# Patient Record
Sex: FEMALE | Race: WHITE | NOT HISPANIC OR LATINO | Employment: OTHER | URBAN - METROPOLITAN AREA
[De-identification: names, ages, dates, MRNs, and addresses within clinical notes are randomized per-mention and may not be internally consistent; named-entity substitution may affect disease eponyms.]

---

## 2017-03-09 ENCOUNTER — TELEPHONE (OUTPATIENT)
Dept: INTERNAL MEDICINE | Facility: CLINIC | Age: 62
End: 2017-03-09

## 2017-03-09 NOTE — TELEPHONE ENCOUNTER
----- Message from Yusra Hopper sent at 3/9/2017 11:29 AM EST -----  Contact: PT'S   PT HAD DAUGHTER ASK WHILE SHE WAS HERE.   12-27-17 HAD HERNIA SURGERY AND SHE WANTED TO KNOW IF SHE NEEDED TO MAKE AN APPOINTMENT FOR A F/U W/ DR MCGHEE   PLEASE CALL THE PATIENT TO LET HER KNOW IF A FOLLOW UP IS NEEDED

## 2017-07-05 ENCOUNTER — OFFICE VISIT (OUTPATIENT)
Dept: INTERNAL MEDICINE | Facility: CLINIC | Age: 62
End: 2017-07-05

## 2017-07-05 VITALS
OXYGEN SATURATION: 99 % | WEIGHT: 204.6 LBS | SYSTOLIC BLOOD PRESSURE: 110 MMHG | HEART RATE: 78 BPM | BODY MASS INDEX: 33.02 KG/M2 | DIASTOLIC BLOOD PRESSURE: 70 MMHG

## 2017-07-05 DIAGNOSIS — H00.015 HORDEOLUM EXTERNUM OF LEFT LOWER EYELID: Primary | ICD-10-CM

## 2017-07-05 PROCEDURE — 99212 OFFICE O/P EST SF 10 MIN: CPT | Performed by: INTERNAL MEDICINE

## 2017-07-05 NOTE — PROGRESS NOTES
Subjective   Samantha Gaines is a 62 y.o. female.   Chief Complaint   Patient presents with   • Stye         History of Present Illness   2 week hx of stye on left eye. Using warm compresses. Went to [facility] and given antibiotic.  The following portions of the patient's history were reviewed and updated as appropriate: allergies, current medications, past family history, past medical history, past social history, past surgical history and problem list.    Review of Systems   Constitutional: Negative for activity change, appetite change, chills, diaphoresis, fatigue, fever and unexpected weight change.   HENT: Negative for congestion, ear discharge, ear pain, mouth sores, nosebleeds, sinus pressure, sneezing and sore throat.         Stye on left eye   Eyes: Negative for pain, discharge and itching.   Respiratory: Negative for cough, chest tightness, shortness of breath and wheezing.    Cardiovascular: Negative for chest pain, palpitations and leg swelling.   Gastrointestinal: Negative for abdominal pain, constipation, diarrhea, nausea and vomiting.   Endocrine: Negative for cold intolerance, heat intolerance, polydipsia and polyphagia.   Genitourinary: Negative for dysuria, flank pain, frequency, hematuria and urgency.   Musculoskeletal: Negative for arthralgias, back pain, gait problem, myalgias, neck pain and neck stiffness.   Skin: Negative for color change, pallor and rash.   Neurological: Negative for seizures, speech difficulty, numbness and headaches.   Psychiatric/Behavioral: Negative for agitation, confusion, decreased concentration and sleep disturbance. The patient is not nervous/anxious.      /70  Pulse 78  Wt 204 lb 9.6 oz (92.8 kg)  SpO2 99%  BMI 33.02 kg/m2    Objective   Physical Exam   Constitutional: She is oriented to person, place, and time.   Eyes: Right eye exhibits no discharge. Left eye exhibits no discharge.   Left lower lid, small stye   Cardiovascular: Regular rhythm and normal heart  sounds.    Pulmonary/Chest: Breath sounds normal.   Neurological: She is oriented to person, place, and time.   Psychiatric: She has a normal mood and affect.       Assessment/Plan   Samantha was seen today for stye.    Diagnoses and all orders for this visit:    Hordeolum externum of left lower eyelid    Is going to see her opth. Continue warm compresses.

## 2017-11-09 ENCOUNTER — OFFICE VISIT (OUTPATIENT)
Dept: INTERNAL MEDICINE | Facility: CLINIC | Age: 62
End: 2017-11-09

## 2017-11-09 VITALS
WEIGHT: 190.6 LBS | HEIGHT: 66 IN | OXYGEN SATURATION: 99 % | DIASTOLIC BLOOD PRESSURE: 80 MMHG | HEART RATE: 85 BPM | SYSTOLIC BLOOD PRESSURE: 110 MMHG | BODY MASS INDEX: 30.63 KG/M2

## 2017-11-09 DIAGNOSIS — Z00.00 HEALTH CARE MAINTENANCE: Primary | ICD-10-CM

## 2017-11-09 DIAGNOSIS — R31.9 URINARY TRACT INFECTION WITH HEMATURIA, SITE UNSPECIFIED: ICD-10-CM

## 2017-11-09 DIAGNOSIS — N39.0 URINARY TRACT INFECTION WITH HEMATURIA, SITE UNSPECIFIED: ICD-10-CM

## 2017-11-09 DIAGNOSIS — Z78.0 POSTMENOPAUSAL: ICD-10-CM

## 2017-11-09 DIAGNOSIS — M25.552 LEFT HIP PAIN: ICD-10-CM

## 2017-11-09 LAB
25(OH)D3 SERPL-MCNC: 23.7 NG/ML
ALBUMIN SERPL-MCNC: 4.4 G/DL (ref 3.2–4.8)
ALBUMIN/GLOB SERPL: 1.8 G/DL (ref 1.5–2.5)
ALP SERPL-CCNC: 109 U/L (ref 25–100)
ALT SERPL W P-5'-P-CCNC: 63 U/L (ref 7–40)
ANION GAP SERPL CALCULATED.3IONS-SCNC: 3 MMOL/L (ref 3–11)
ARTICHOKE IGE QN: 134 MG/DL (ref 0–130)
AST SERPL-CCNC: 41 U/L (ref 0–33)
BASOPHILS # BLD AUTO: 0.05 10*3/MM3 (ref 0–0.2)
BASOPHILS NFR BLD AUTO: 0.4 % (ref 0–1)
BILIRUB BLD-MCNC: ABNORMAL MG/DL
BILIRUB SERPL-MCNC: 1 MG/DL (ref 0.3–1.2)
BUN BLD-MCNC: 11 MG/DL (ref 9–23)
BUN/CREAT SERPL: 15.7 (ref 7–25)
CALCIUM SPEC-SCNC: 9.4 MG/DL (ref 8.7–10.4)
CHLORIDE SERPL-SCNC: 106 MMOL/L (ref 99–109)
CHOLEST SERPL-MCNC: 223 MG/DL (ref 0–200)
CLARITY, POC: CLEAR
CO2 SERPL-SCNC: 34 MMOL/L (ref 20–31)
COLOR UR: YELLOW
CREAT BLD-MCNC: 0.7 MG/DL (ref 0.6–1.3)
DEPRECATED RDW RBC AUTO: 45.1 FL (ref 37–54)
EOSINOPHIL # BLD AUTO: 0.11 10*3/MM3 (ref 0–0.3)
EOSINOPHIL NFR BLD AUTO: 1 % (ref 0–3)
ERYTHROCYTE [DISTWIDTH] IN BLOOD BY AUTOMATED COUNT: 12.8 % (ref 11.3–14.5)
GFR SERPL CREATININE-BSD FRML MDRD: 84 ML/MIN/1.73
GLOBULIN UR ELPH-MCNC: 2.5 GM/DL
GLUCOSE BLD-MCNC: 80 MG/DL (ref 70–100)
GLUCOSE UR STRIP-MCNC: NEGATIVE MG/DL
HCT VFR BLD AUTO: 43.5 % (ref 34.5–44)
HDLC SERPL-MCNC: 65 MG/DL (ref 40–60)
HGB BLD-MCNC: 15.2 G/DL (ref 11.5–15.5)
IMM GRANULOCYTES # BLD: 0.03 10*3/MM3 (ref 0–0.03)
IMM GRANULOCYTES NFR BLD: 0.3 % (ref 0–0.6)
KETONES UR QL: NEGATIVE
LEUKOCYTE EST, POC: ABNORMAL
LYMPHOCYTES # BLD AUTO: 1.94 10*3/MM3 (ref 0.6–4.8)
LYMPHOCYTES NFR BLD AUTO: 17 % (ref 24–44)
MCH RBC QN AUTO: 33.9 PG (ref 27–31)
MCHC RBC AUTO-ENTMCNC: 34.9 G/DL (ref 32–36)
MCV RBC AUTO: 96.9 FL (ref 80–99)
MONOCYTES # BLD AUTO: 0.85 10*3/MM3 (ref 0–1)
MONOCYTES NFR BLD AUTO: 7.5 % (ref 0–12)
NEUTROPHILS # BLD AUTO: 8.41 10*3/MM3 (ref 1.5–8.3)
NEUTROPHILS NFR BLD AUTO: 73.8 % (ref 41–71)
NITRITE UR-MCNC: NEGATIVE MG/ML
PH UR: 5 [PH] (ref 5–8)
PLATELET # BLD AUTO: 277 10*3/MM3 (ref 150–450)
PMV BLD AUTO: 10.8 FL (ref 6–12)
POTASSIUM BLD-SCNC: 4.3 MMOL/L (ref 3.5–5.5)
PROT SERPL-MCNC: 6.9 G/DL (ref 5.7–8.2)
PROT UR STRIP-MCNC: NEGATIVE MG/DL
RBC # BLD AUTO: 4.49 10*6/MM3 (ref 3.89–5.14)
RBC # UR STRIP: ABNORMAL /UL
SODIUM BLD-SCNC: 143 MMOL/L (ref 132–146)
SP GR UR: 1.02 (ref 1–1.03)
TRIGL SERPL-MCNC: 152 MG/DL (ref 0–150)
TSH SERPL DL<=0.05 MIU/L-ACNC: 2.1 MIU/ML (ref 0.35–5.35)
UROBILINOGEN UR QL: NORMAL
WBC NRBC COR # BLD: 11.39 10*3/MM3 (ref 3.5–10.8)

## 2017-11-09 PROCEDURE — 87086 URINE CULTURE/COLONY COUNT: CPT | Performed by: INTERNAL MEDICINE

## 2017-11-09 PROCEDURE — 80053 COMPREHEN METABOLIC PANEL: CPT | Performed by: INTERNAL MEDICINE

## 2017-11-09 PROCEDURE — 81003 URINALYSIS AUTO W/O SCOPE: CPT | Performed by: INTERNAL MEDICINE

## 2017-11-09 PROCEDURE — 82306 VITAMIN D 25 HYDROXY: CPT | Performed by: INTERNAL MEDICINE

## 2017-11-09 PROCEDURE — 84443 ASSAY THYROID STIM HORMONE: CPT | Performed by: INTERNAL MEDICINE

## 2017-11-09 PROCEDURE — 85025 COMPLETE CBC W/AUTO DIFF WBC: CPT | Performed by: INTERNAL MEDICINE

## 2017-11-09 PROCEDURE — 90686 IIV4 VACC NO PRSV 0.5 ML IM: CPT | Performed by: INTERNAL MEDICINE

## 2017-11-09 PROCEDURE — 80061 LIPID PANEL: CPT | Performed by: INTERNAL MEDICINE

## 2017-11-09 PROCEDURE — 90471 IMMUNIZATION ADMIN: CPT | Performed by: INTERNAL MEDICINE

## 2017-11-09 PROCEDURE — 99396 PREV VISIT EST AGE 40-64: CPT | Performed by: INTERNAL MEDICINE

## 2017-11-09 RX ORDER — NITROFURANTOIN 25; 75 MG/1; MG/1
100 CAPSULE ORAL 2 TIMES DAILY
Qty: 14 CAPSULE | Refills: 0 | Status: SHIPPED | OUTPATIENT
Start: 2017-11-09 | End: 2017-11-16

## 2017-11-09 NOTE — PROGRESS NOTES
"Chief Complaint   Patient presents with   • Annual Exam         Well PE  Reported Health  Good Yes  FairNo  PoorNo      Dental,Vision,Hearing  Regular dental visitsYes  Vision ProblemsYes  Hearing LossNo      Immunization Status:  Up To DateNo        Lifestyle  Healthy DietYes  Weight ConcernsYes  Regular ExerciseNo  Tobacco UseNo  Alcohol UseYes  Drug AbuseNo      Screening  Cancer ScreeningYes  Metabolic ScreeningYes  Risk ScreeningYes  Past Medical History:   Diagnosis Date   • Breast cyst    • Diverticulosis    • Essential hypertriglyceridemia    • PONV (postoperative nausea and vomiting)    • Vitamin D deficiency     \"HAD BLOODWORK RECENTLY AND MY DOCTOR DIDN'T SAY ANYTHING ABOUT THIS AT THAT TIME\"     Past Surgical History:   Procedure Laterality Date   • BREAST CYST EXCISION      PT UNSURE OF LATERALITY   • COLONOSCOPY  UNKNOWN   • DIAGNOSTIC LAPAROSCOPY     • HYSTERECTOMY     • VENTRAL/INCISIONAL HERNIA REPAIR N/A 12/27/2016    Procedure: OPEN INCISIONAL HERNIA REPAIR WITH MESH;  Surgeon: Maximino Sparks MD;  Location: [facility];  Service:      Family History   Problem Relation Age of Onset   • Diabetes Other    • Heart disease Other    • Hypertension Other      Social History     Social History   • Marital status:      Spouse name: N/A   • Number of children: N/A   • Years of education: N/A     Occupational History   • Not on file.     Social History Main Topics   • Smoking status: Never Smoker   • Smokeless tobacco: Not on file   • Alcohol use 2.4 oz/week     4 Glasses of wine per week   • Drug use: No   • Sexual activity: Defer     Other Topics Concern   • Not on file     Social History Narrative         Review of Systems   Constitutional: Negative for activity change, appetite change, chills, diaphoresis, fatigue, fever and unexpected weight change.   HENT: Negative for congestion, ear discharge, ear pain, mouth sores, nosebleeds, sinus pressure, sneezing and sore throat.    Eyes: Negative for " "pain, discharge and itching.   Respiratory: Negative for cough, chest tightness, shortness of breath and wheezing.    Cardiovascular: Negative for chest pain, palpitations and leg swelling.   Gastrointestinal: Negative for abdominal pain, constipation, diarrhea, nausea and vomiting.   Endocrine: Negative for cold intolerance, heat intolerance, polydipsia and polyphagia.   Genitourinary: Negative for dysuria, flank pain, frequency, hematuria and urgency.   Musculoskeletal: Negative for arthralgias, back pain, gait problem, myalgias, neck pain and neck stiffness.        Left hip pain   Skin: Negative for color change, pallor and rash.   Neurological: Negative for seizures, speech difficulty, numbness and headaches.   Psychiatric/Behavioral: Negative for agitation, confusion, decreased concentration and sleep disturbance. The patient is not nervous/anxious.      /80  Pulse 85  Ht 66.25\" (168.3 cm)  Wt 190 lb 9.6 oz (86.5 kg)  SpO2 99%  BMI 30.53 kg/m2    Physical Exam   Constitutional: She is oriented to person, place, and time. She appears well-developed.   HENT:   Head: Normocephalic.   Right Ear: External ear normal.   Left Ear: External ear normal.   Nose: Nose normal.   Mouth/Throat: Oropharynx is clear and moist.   Eyes: Conjunctivae are normal. Pupils are equal, round, and reactive to light.   Neck: No JVD present. No thyromegaly present.   Cardiovascular: Normal rate, regular rhythm and normal heart sounds.  Exam reveals no friction rub.    No murmur heard.  Pulmonary/Chest: Effort normal and breath sounds normal. No respiratory distress. She has no wheezes. She has no rales.   Abdominal: Soft. Bowel sounds are normal. She exhibits no distension. There is no tenderness. There is no guarding.   Musculoskeletal: She exhibits no edema or tenderness.   Lymphadenopathy:     She has no cervical adenopathy.   Neurological: She is oriented to person, place, and time. She displays normal reflexes. No cranial " nerve deficit.   Skin: No rash noted.   Psychiatric: She has a normal mood and affect. Her behavior is normal.   Nursing note and vitals reviewed.        Diet and Exercise    Healthy Diet Yes  Adequate DietYes  Poor DietNo  Adequate Exercise RegimenNo  Inadequate Exercise RegimenYes      Cervical Cancer Screening    HX ASHLY and no longer does pap  Breast Cancer screening  Risks and Benefits DiscussedYes  Self Breast Exam taughtNo  Monthly Self Exam AdvisedYes  Screening CurrentYes  Mammogram OrderedYes  Screening Not IndicatedNo  Screening Managed By GYNNo  Patient DeclinesNo      STD Testing  ChlamydiaNo  GonorrheaNo  HIVNo      Osteoporosis Screening  Risks And Benefits DiscussedYes  BMD CurrentNo  BMD orderedYes  Patient DeclinesNo    Colorectal Cancer Screening  Risks and Benefits DiscussedYes  Screening currentYes  FOBT Supplies givenYes  FOBT Every YearYes  Colonoscopy OrderedNo  Colonoscopy every 5 yearsNo  Colonoscopy every 10 yearsYes  Screening not indicatedNo  Patient declinesNo    Metabolic Screening  GlucoseYes  LipidsYes  CBCYes  TSHYes  UAYes  CMPYes  25OHYes      Immunizations  Risks and benefits discussedYes  Immunizations Up To DateNo  Immunizations NeededYes  Immunizations Per OrdersNo  Patient DYeseclines      Preventative Counseling  NutritionYes  Aerobic ExerciseYes  Weight Bearing ExerciseYes  Weight LossYes  Calcium SupplementsYes  Vitamin D SupplementsYes  Reproductive HealthNo  Cardiovascular Risk ReductionYes  Tobacco CessationNo  Alcohol UseYes  Sunscreen UseYes  Self Skin ExaminationNo  Helmet UseNo  Seat Belt UseYes  Fall Risk ReductionNo  Advanced Directive PlanningNo      Patient Discussion  PatientYes  FamilyNo  CounselingYes  Samantha was seen today for annual exam.    Diagnoses and all orders for this visit:    Health care maintenance  -     POC Urinalysis Dipstick, Automated  -     CBC & Differential  -     Comprehensive Metabolic Panel  -     Lipid Panel  -     TSH  -     Vitamin D  25 Hydroxy  -     CBC Auto Differential    Postmenopausal  -     DEXA Bone Density Axial; Future    Left hip pain    Urinary tract infection with hematuria, site unspecified  -     Urine Culture - Urine, Urine, Clean Catch  macrobid 100 mg po q 12 x 7 days  Other orders  -     Flu Vaccine Intradermal Quad 18-64YR  -     nitrofurantoin, macrocrystal-monohydrate, (MACROBID) 100 MG capsule; Take 1 capsule by mouth 2 (Two) Times a Day for 7 days.    topical otc creams/ nsaids

## 2017-11-10 ENCOUNTER — TELEPHONE (OUTPATIENT)
Dept: INTERNAL MEDICINE | Facility: CLINIC | Age: 62
End: 2017-11-10

## 2017-11-10 DIAGNOSIS — R74.8 ELEVATED LIVER ENZYMES: Primary | ICD-10-CM

## 2017-11-10 DIAGNOSIS — Z86.2 HX OF LEUKOCYTOSIS: ICD-10-CM

## 2017-11-11 LAB
BACTERIA SPEC AEROBE CULT: NORMAL
BACTERIA SPEC AEROBE CULT: NORMAL

## 2017-11-13 ENCOUNTER — TRANSCRIBE ORDERS (OUTPATIENT)
Dept: ADMINISTRATIVE | Facility: HOSPITAL | Age: 62
End: 2017-11-13

## 2017-11-13 DIAGNOSIS — Z12.31 VISIT FOR SCREENING MAMMOGRAM: Primary | ICD-10-CM

## 2017-11-15 ENCOUNTER — HOSPITAL ENCOUNTER (OUTPATIENT)
Dept: BONE DENSITY | Facility: HOSPITAL | Age: 62
Discharge: HOME | End: 2017-11-15
Attending: INTERNAL MEDICINE | Admitting: INTERNAL MEDICINE

## 2017-11-15 DIAGNOSIS — Z78.0 POSTMENOPAUSAL: ICD-10-CM

## 2017-11-15 PROCEDURE — 77080 DXA BONE DENSITY AXIAL: CPT

## 2017-11-20 ENCOUNTER — HOSPITAL ENCOUNTER (OUTPATIENT)
Dept: MAMMOGRAPHY | Facility: HOSPITAL | Age: 62
Discharge: HOME | End: 2017-11-20
Attending: INTERNAL MEDICINE | Admitting: INTERNAL MEDICINE

## 2017-11-20 DIAGNOSIS — Z12.31 VISIT FOR SCREENING MAMMOGRAM: ICD-10-CM

## 2017-11-20 PROCEDURE — 77063 BREAST TOMOSYNTHESIS BI: CPT

## 2017-11-20 PROCEDURE — G0202 SCR MAMMO BI INCL CAD: HCPCS

## 2017-11-21 ENCOUNTER — LAB (OUTPATIENT)
Dept: INTERNAL MEDICINE | Facility: CLINIC | Age: 62
End: 2017-11-21

## 2017-11-21 DIAGNOSIS — Z86.2 HX OF LEUKOCYTOSIS: ICD-10-CM

## 2017-11-21 DIAGNOSIS — R74.8 ELEVATED LIVER ENZYMES: ICD-10-CM

## 2017-11-21 LAB
ALBUMIN SERPL-MCNC: 4.3 G/DL (ref 3.2–4.8)
ALBUMIN/GLOB SERPL: 1.7 G/DL (ref 1.5–2.5)
ALP SERPL-CCNC: 126 U/L (ref 25–100)
ALT SERPL W P-5'-P-CCNC: 106 U/L (ref 7–40)
ANION GAP SERPL CALCULATED.3IONS-SCNC: 6 MMOL/L (ref 3–11)
AST SERPL-CCNC: 44 U/L (ref 0–33)
BASOPHILS # BLD AUTO: 0.07 10*3/MM3 (ref 0–0.2)
BASOPHILS NFR BLD AUTO: 1 % (ref 0–1)
BILIRUB SERPL-MCNC: 0.6 MG/DL (ref 0.3–1.2)
BUN BLD-MCNC: 14 MG/DL (ref 9–23)
BUN/CREAT SERPL: 20 (ref 7–25)
CALCIUM SPEC-SCNC: 9.3 MG/DL (ref 8.7–10.4)
CHLORIDE SERPL-SCNC: 108 MMOL/L (ref 99–109)
CO2 SERPL-SCNC: 29 MMOL/L (ref 20–31)
CREAT BLD-MCNC: 0.7 MG/DL (ref 0.6–1.3)
DEPRECATED RDW RBC AUTO: 44.5 FL (ref 37–54)
EOSINOPHIL # BLD AUTO: 0.16 10*3/MM3 (ref 0–0.3)
EOSINOPHIL NFR BLD AUTO: 2.3 % (ref 0–3)
ERYTHROCYTE [DISTWIDTH] IN BLOOD BY AUTOMATED COUNT: 12.7 % (ref 11.3–14.5)
FERRITIN SERPL-MCNC: 259 NG/ML (ref 10–291)
GFR SERPL CREATININE-BSD FRML MDRD: 84 ML/MIN/1.73
GGT SERPL-CCNC: 132 U/L (ref 0–37)
GLOBULIN UR ELPH-MCNC: 2.5 GM/DL
GLUCOSE BLD-MCNC: 88 MG/DL (ref 70–100)
HAV IGM SERPL QL IA: NORMAL
HBV CORE IGM SERPL QL IA: NORMAL
HBV SURFACE AG SERPL QL IA: NORMAL
HCT VFR BLD AUTO: 44.5 % (ref 34.5–44)
HCV AB SER DONR QL: NORMAL
HGB BLD-MCNC: 14.9 G/DL (ref 11.5–15.5)
IMM GRANULOCYTES # BLD: 0.02 10*3/MM3 (ref 0–0.03)
IMM GRANULOCYTES NFR BLD: 0.3 % (ref 0–0.6)
IRON 24H UR-MRATE: 107 MCG/DL (ref 50–175)
IRON SATN MFR SERPL: 39 % (ref 15–50)
LYMPHOCYTES # BLD AUTO: 1.87 10*3/MM3 (ref 0.6–4.8)
LYMPHOCYTES NFR BLD AUTO: 26.5 % (ref 24–44)
MCH RBC QN AUTO: 32.2 PG (ref 27–31)
MCHC RBC AUTO-ENTMCNC: 33.5 G/DL (ref 32–36)
MCV RBC AUTO: 96.1 FL (ref 80–99)
MONOCYTES # BLD AUTO: 0.55 10*3/MM3 (ref 0–1)
MONOCYTES NFR BLD AUTO: 7.8 % (ref 0–12)
NEUTROPHILS # BLD AUTO: 4.39 10*3/MM3 (ref 1.5–8.3)
NEUTROPHILS NFR BLD AUTO: 62.1 % (ref 41–71)
PLATELET # BLD AUTO: 296 10*3/MM3 (ref 150–450)
PMV BLD AUTO: 10.9 FL (ref 6–12)
POTASSIUM BLD-SCNC: 4.4 MMOL/L (ref 3.5–5.5)
PROT SERPL-MCNC: 6.8 G/DL (ref 5.7–8.2)
RBC # BLD AUTO: 4.63 10*6/MM3 (ref 3.89–5.14)
SODIUM BLD-SCNC: 143 MMOL/L (ref 132–146)
TIBC SERPL-MCNC: 271 MCG/DL (ref 250–450)
WBC NRBC COR # BLD: 7.06 10*3/MM3 (ref 3.5–10.8)

## 2017-11-21 PROCEDURE — 83516 IMMUNOASSAY NONANTIBODY: CPT | Performed by: INTERNAL MEDICINE

## 2017-11-21 PROCEDURE — 77067 SCR MAMMO BI INCL CAD: CPT | Performed by: RADIOLOGY

## 2017-11-21 PROCEDURE — 83550 IRON BINDING TEST: CPT | Performed by: INTERNAL MEDICINE

## 2017-11-21 PROCEDURE — 77063 BREAST TOMOSYNTHESIS BI: CPT | Performed by: RADIOLOGY

## 2017-11-21 PROCEDURE — 82728 ASSAY OF FERRITIN: CPT | Performed by: INTERNAL MEDICINE

## 2017-11-21 PROCEDURE — 80053 COMPREHEN METABOLIC PANEL: CPT | Performed by: INTERNAL MEDICINE

## 2017-11-21 PROCEDURE — 82103 ALPHA-1-ANTITRYPSIN TOTAL: CPT | Performed by: INTERNAL MEDICINE

## 2017-11-21 PROCEDURE — 82977 ASSAY OF GGT: CPT | Performed by: INTERNAL MEDICINE

## 2017-11-21 PROCEDURE — 85025 COMPLETE CBC W/AUTO DIFF WBC: CPT | Performed by: INTERNAL MEDICINE

## 2017-11-21 PROCEDURE — 86038 ANTINUCLEAR ANTIBODIES: CPT | Performed by: INTERNAL MEDICINE

## 2017-11-21 PROCEDURE — 83540 ASSAY OF IRON: CPT | Performed by: INTERNAL MEDICINE

## 2017-11-21 PROCEDURE — 80074 ACUTE HEPATITIS PANEL: CPT | Performed by: INTERNAL MEDICINE

## 2017-11-22 ENCOUNTER — TELEPHONE (OUTPATIENT)
Dept: INTERNAL MEDICINE | Facility: CLINIC | Age: 62
End: 2017-11-22

## 2017-11-22 DIAGNOSIS — R74.8 ELEVATED LIVER ENZYMES: Primary | ICD-10-CM

## 2017-11-22 LAB
A1AT SERPL-MCNC: 162 MG/DL (ref 90–200)
ACTIN IGG SERPL-ACNC: 3 UNITS (ref 0–19)
ANA SER QL IA: NEGATIVE
DEPRECATED MITOCHONDRIA M2 IGG SER-ACNC: <20 UNITS (ref 0–20)

## 2017-11-22 NOTE — TELEPHONE ENCOUNTER
Pt informed and stated verbal understanding. She stated that she is not sure when she can come in for an annabelle in a week, she will call and schedule an appointment when she knows what day she is available.

## 2017-11-22 NOTE — TELEPHONE ENCOUNTER
----- Message from Julia Figueroa, DO sent at 11/22/2017  7:07 AM EST -----  Let know white count back to normal. Liver studies are still increased. No alcohol or Tylenol. Additional liver studies pending at this time. Needs us liver. Order in chart.  Needs 1 week office follow up with repeat liver labs. If develops nausea, vomiting, abdominal pain, itching, call.

## 2017-12-01 ENCOUNTER — OFFICE VISIT (OUTPATIENT)
Dept: INTERNAL MEDICINE | Facility: CLINIC | Age: 62
End: 2017-12-01

## 2017-12-01 VITALS
BODY MASS INDEX: 30.92 KG/M2 | OXYGEN SATURATION: 99 % | WEIGHT: 193 LBS | HEART RATE: 72 BPM | DIASTOLIC BLOOD PRESSURE: 80 MMHG | SYSTOLIC BLOOD PRESSURE: 130 MMHG

## 2017-12-01 DIAGNOSIS — R31.29 MICROSCOPIC HEMATURIA: ICD-10-CM

## 2017-12-01 DIAGNOSIS — R74.8 ELEVATED LIVER ENZYMES: Primary | ICD-10-CM

## 2017-12-01 DIAGNOSIS — R82.90 ABNORMAL URINE FINDING: ICD-10-CM

## 2017-12-01 LAB
ALBUMIN SERPL-MCNC: 4.3 G/DL (ref 3.2–4.8)
ALBUMIN/GLOB SERPL: 1.8 G/DL (ref 1.5–2.5)
ALP SERPL-CCNC: 101 U/L (ref 25–100)
ALT SERPL W P-5'-P-CCNC: 32 U/L (ref 7–40)
ANION GAP SERPL CALCULATED.3IONS-SCNC: 6 MMOL/L (ref 3–11)
AST SERPL-CCNC: 24 U/L (ref 0–33)
BILIRUB BLD-MCNC: ABNORMAL MG/DL
BILIRUB SERPL-MCNC: 0.8 MG/DL (ref 0.3–1.2)
BUN BLD-MCNC: 16 MG/DL (ref 9–23)
BUN/CREAT SERPL: 22.9 (ref 7–25)
CALCIUM SPEC-SCNC: 9.5 MG/DL (ref 8.7–10.4)
CHLORIDE SERPL-SCNC: 105 MMOL/L (ref 99–109)
CLARITY, POC: CLEAR
CO2 SERPL-SCNC: 30 MMOL/L (ref 20–31)
COLOR UR: YELLOW
CREAT BLD-MCNC: 0.7 MG/DL (ref 0.6–1.3)
GFR SERPL CREATININE-BSD FRML MDRD: 84 ML/MIN/1.73
GLOBULIN UR ELPH-MCNC: 2.4 GM/DL
GLUCOSE BLD-MCNC: 89 MG/DL (ref 70–100)
GLUCOSE UR STRIP-MCNC: NEGATIVE MG/DL
KETONES UR QL: NEGATIVE
LEUKOCYTE EST, POC: ABNORMAL
NITRITE UR-MCNC: NEGATIVE MG/ML
PH UR: 6 [PH] (ref 5–8)
POTASSIUM BLD-SCNC: 4.6 MMOL/L (ref 3.5–5.5)
PROT SERPL-MCNC: 6.7 G/DL (ref 5.7–8.2)
PROT UR STRIP-MCNC: NEGATIVE MG/DL
RBC # UR STRIP: NEGATIVE /UL
SODIUM BLD-SCNC: 141 MMOL/L (ref 132–146)
SP GR UR: 1.01 (ref 1–1.03)
UROBILINOGEN UR QL: NORMAL

## 2017-12-01 PROCEDURE — 99213 OFFICE O/P EST LOW 20 MIN: CPT | Performed by: INTERNAL MEDICINE

## 2017-12-01 PROCEDURE — 80053 COMPREHEN METABOLIC PANEL: CPT | Performed by: INTERNAL MEDICINE

## 2017-12-01 PROCEDURE — 81003 URINALYSIS AUTO W/O SCOPE: CPT | Performed by: INTERNAL MEDICINE

## 2017-12-01 NOTE — PROGRESS NOTES
Subjective   Samantha Gaines is a 62 y.o. female.   Chief Complaint   Patient presents with   • elevated liver enzymes       History of Present Illness   Elevated liver enzymes. Not on meds. 2-3 drinks a week. ALT continues to trend up.  No nausea or vomiting.  Microscopic hematuria.  The following portions of the patient's history were reviewed and updated as appropriate: allergies, current medications, past family history, past medical history, past social history, past surgical history and problem list.    Review of Systems   Constitutional: Negative for activity change, appetite change, chills, diaphoresis, fatigue, fever and unexpected weight change.   HENT: Negative for congestion, ear discharge, ear pain, mouth sores, nosebleeds, sinus pressure, sneezing and sore throat.    Eyes: Negative for pain, discharge and itching.   Respiratory: Negative for cough, chest tightness, shortness of breath and wheezing.    Cardiovascular: Negative for chest pain, palpitations and leg swelling.   Gastrointestinal: Negative for abdominal pain, constipation, diarrhea, nausea and vomiting.   Endocrine: Negative for cold intolerance, heat intolerance, polydipsia and polyphagia.   Genitourinary: Negative for dysuria, flank pain, frequency, hematuria and urgency.   Musculoskeletal: Negative for arthralgias, back pain, gait problem, myalgias, neck pain and neck stiffness.   Skin: Negative for color change, pallor and rash.   Neurological: Negative for seizures, speech difficulty, numbness and headaches.   Psychiatric/Behavioral: Negative for agitation, confusion, decreased concentration and sleep disturbance. The patient is not nervous/anxious.      /80  Pulse 72  Wt 193 lb (87.5 kg)  SpO2 99%  BMI 30.92 kg/m2    Objective   Physical Exam   Constitutional: She appears well-developed.   HENT:   Head: Normocephalic.   Right Ear: External ear normal.   Left Ear: External ear normal.   Nose: Nose normal.   Mouth/Throat:  Oropharynx is clear and moist.   Eyes: Conjunctivae are normal. Pupils are equal, round, and reactive to light.   Neck: No JVD present. No thyromegaly present.   Cardiovascular: Normal rate, regular rhythm and normal heart sounds.  Exam reveals no friction rub.    No murmur heard.  Pulmonary/Chest: Effort normal and breath sounds normal. No respiratory distress. She has no wheezes. She has no rales.   Abdominal: Soft. Bowel sounds are normal. She exhibits no distension. There is no tenderness. There is no guarding.   Musculoskeletal: She exhibits no edema or tenderness.   Lymphadenopathy:     She has no cervical adenopathy.   Neurological: She displays normal reflexes. No cranial nerve deficit.   Skin: No rash noted.   Psychiatric: Her behavior is normal.   Nursing note and vitals reviewed.      Assessment/Plan   Samantha was seen today for elevated liver enzymes.    Diagnoses and all orders for this visit:    Elevated liver enzymes  -     Comprehensive Metabolic Panel    Microscopic hematuria  -     POC Urinalysis Dipstick, Automated    Abnormal urine finding  -     Urine Culture - Urine, Urine, Clean Catch    ua leukocytes. No antibiotic given. Last recent UA had leuk and culture was negative. Awaiting culture.

## 2017-12-04 ENCOUNTER — HOSPITAL ENCOUNTER (OUTPATIENT)
Dept: ULTRASOUND IMAGING | Facility: HOSPITAL | Age: 62
Discharge: HOME | End: 2017-12-04
Attending: INTERNAL MEDICINE | Admitting: INTERNAL MEDICINE

## 2017-12-04 DIAGNOSIS — R74.8 ELEVATED LIVER ENZYMES: ICD-10-CM

## 2017-12-04 PROCEDURE — 76705 ECHO EXAM OF ABDOMEN: CPT

## 2017-12-06 ENCOUNTER — TELEPHONE (OUTPATIENT)
Dept: INTERNAL MEDICINE | Facility: CLINIC | Age: 62
End: 2017-12-06

## 2017-12-13 DIAGNOSIS — M25.552 LEFT HIP PAIN: Primary | ICD-10-CM

## 2018-01-05 ENCOUNTER — TRANSCRIBE ORDERS (OUTPATIENT)
Dept: MAMMOGRAPHY | Facility: HOSPITAL | Age: 63
End: 2018-01-05

## 2018-01-05 ENCOUNTER — HOSPITAL ENCOUNTER (OUTPATIENT)
Dept: MAMMOGRAPHY | Facility: HOSPITAL | Age: 63
Discharge: HOME | End: 2018-01-05
Admitting: INTERNAL MEDICINE

## 2018-01-05 DIAGNOSIS — R92.8 ABNORMAL MAMMOGRAM: ICD-10-CM

## 2018-01-05 DIAGNOSIS — R92.8 ABNORMAL MAMMOGRAM: Primary | ICD-10-CM

## 2018-01-05 PROCEDURE — 77065 DX MAMMO INCL CAD UNI: CPT

## 2018-01-05 PROCEDURE — 77065 DX MAMMO INCL CAD UNI: CPT | Performed by: RADIOLOGY

## 2018-01-08 ENCOUNTER — TELEPHONE (OUTPATIENT)
Dept: INTERNAL MEDICINE | Facility: CLINIC | Age: 63
End: 2018-01-08

## 2018-01-08 NOTE — TELEPHONE ENCOUNTER
----- Message from Julia Figueroa, DO sent at 1/8/2018  7:19 AM EST -----  Call her and make sure the breast center has set up a breast biopsy. There are two area of calcifications that need evaluated.

## 2018-01-10 ENCOUNTER — OFFICE VISIT (OUTPATIENT)
Dept: INTERNAL MEDICINE | Facility: CLINIC | Age: 63
End: 2018-01-10

## 2018-01-10 VITALS
BODY MASS INDEX: 31.53 KG/M2 | RESPIRATION RATE: 18 BRPM | WEIGHT: 196.8 LBS | DIASTOLIC BLOOD PRESSURE: 80 MMHG | HEART RATE: 78 BPM | TEMPERATURE: 97.6 F | SYSTOLIC BLOOD PRESSURE: 132 MMHG

## 2018-01-10 DIAGNOSIS — J02.9 SORE THROAT: ICD-10-CM

## 2018-01-10 DIAGNOSIS — R68.89 FLU-LIKE SYMPTOMS: Primary | ICD-10-CM

## 2018-01-10 LAB
EXPIRATION DATE: NORMAL
EXPIRATION DATE: NORMAL
FLUAV AG NPH QL: NORMAL
FLUBV AG NPH QL: NORMAL
INTERNAL CONTROL: NORMAL
INTERNAL CONTROL: NORMAL
Lab: NORMAL
Lab: NORMAL
S PYO AG THROAT QL: NEGATIVE

## 2018-01-10 PROCEDURE — 87804 INFLUENZA ASSAY W/OPTIC: CPT | Performed by: INTERNAL MEDICINE

## 2018-01-10 PROCEDURE — 87880 STREP A ASSAY W/OPTIC: CPT | Performed by: INTERNAL MEDICINE

## 2018-01-10 PROCEDURE — 99213 OFFICE O/P EST LOW 20 MIN: CPT | Performed by: INTERNAL MEDICINE

## 2018-01-10 NOTE — PROGRESS NOTES
Subjective   Samantha Gaines is a 62 y.o. female.   Chief Complaint   Patient presents with   • Sore Throat       History of Present Illness   Sore throat started Monday. Sinus congestion. Cough. No fever or chills.  No nausea or vomiting. No diarrhea.  The following portions of the patient's history were reviewed and updated as appropriate: allergies, current medications, past family history, past medical history, past social history, past surgical history and problem list.    Review of Systems   Constitutional: Negative for activity change, appetite change, chills, diaphoresis, fatigue, fever and unexpected weight change.   HENT: Negative for congestion, ear discharge, ear pain, mouth sores, nosebleeds, sinus pressure, sneezing and sore throat.    Eyes: Negative for pain, discharge and itching.   Respiratory: Negative for cough, chest tightness, shortness of breath and wheezing.    Cardiovascular: Negative for chest pain, palpitations and leg swelling.   Gastrointestinal: Negative for abdominal pain, constipation, diarrhea, nausea and vomiting.   Endocrine: Negative for cold intolerance, heat intolerance, polydipsia and polyphagia.   Genitourinary: Negative for dysuria, flank pain, frequency, hematuria and urgency.   Musculoskeletal: Negative for arthralgias, back pain, gait problem, myalgias, neck pain and neck stiffness.   Skin: Negative for color change, pallor and rash.   Neurological: Negative for seizures, speech difficulty, numbness and headaches.   Psychiatric/Behavioral: Negative for agitation, confusion, decreased concentration and sleep disturbance. The patient is not nervous/anxious.    /80 (BP Location: Right arm, Patient Position: Sitting, Cuff Size: Adult)  Pulse 78  Temp 97.6 °F (36.4 °C) (Oral)   Resp 18  Wt 89.3 kg (196 lb 12.8 oz)  BMI 31.53 kg/m2      Objective   Physical Exam   Constitutional: She appears well-developed.   HENT:   Head: Normocephalic.   Right Ear: External ear  normal.   Left Ear: External ear normal.   Nose: Nose normal.   Mouth/Throat: Oropharynx is clear and moist.   Eyes: Conjunctivae are normal. Pupils are equal, round, and reactive to light.   Neck: No JVD present. No thyromegaly present.   Cardiovascular: Normal rate, regular rhythm and normal heart sounds.  Exam reveals no friction rub.    No murmur heard.  Pulmonary/Chest: Effort normal and breath sounds normal. No respiratory distress. She has no wheezes. She has no rales.   Abdominal: Soft. Bowel sounds are normal. She exhibits no distension. There is no tenderness. There is no guarding.   Musculoskeletal: She exhibits no edema or tenderness.   Lymphadenopathy:     She has no cervical adenopathy.   Neurological: She displays normal reflexes. No cranial nerve deficit.   Skin: No rash noted.   Psychiatric: Her behavior is normal.   Nursing note and vitals reviewed.      Assessment/Plan   Samantha was seen today for sore throat.    Diagnoses and all orders for this visit:    Flu-like symptoms    Can use otc meds such as Sambucol.  Flu a and b neg. Strep neg    She is going to have biopsy for breast calcifications

## 2018-01-29 ENCOUNTER — OFFICE VISIT (OUTPATIENT)
Dept: ORTHOPEDIC SURGERY | Facility: CLINIC | Age: 63
End: 2018-01-29

## 2018-01-29 VITALS
HEART RATE: 78 BPM | BODY MASS INDEX: 30.82 KG/M2 | SYSTOLIC BLOOD PRESSURE: 153 MMHG | HEIGHT: 66 IN | DIASTOLIC BLOOD PRESSURE: 84 MMHG | WEIGHT: 191.8 LBS

## 2018-01-29 DIAGNOSIS — M25.552 PAIN OF LEFT HIP JOINT: Primary | ICD-10-CM

## 2018-01-29 PROCEDURE — 99203 OFFICE O/P NEW LOW 30 MIN: CPT | Performed by: ORTHOPAEDIC SURGERY

## 2018-01-29 RX ORDER — MELOXICAM 7.5 MG/1
TABLET ORAL
Qty: 90 TABLET | Refills: 0 | Status: SHIPPED | OUTPATIENT
Start: 2018-01-29 | End: 2018-08-15

## 2018-01-29 NOTE — PROGRESS NOTES
"    [facility] [place] [facility] Note    Subjective     Chief Complaint   Patient presents with   • Left Hip - Pain        HPI    Samantha Gaines is a 62 y.o. female. She presents today for evaluation of left hip pain.  The pain actually radiates from her left hip down to the knee, or perhaps the knee is a separate issue.  At any rate the pain all started in the hip a few years ago, but his been worsening over the past 3 months.  Pain is moderate in severity, aching and sharp in quality.      Patient Active Problem List   Diagnosis   • Diverticulitis of colon   • Hyperlipidemia   • Vitamin D deficiency   • Incisional hernia     Past Medical History:   Diagnosis Date   • Breast cyst    • Diverticulosis    • Essential hypertriglyceridemia    • PONV (postoperative nausea and vomiting)    • Vitamin D deficiency     \"HAD BLOODWORK RECENTLY AND MY DOCTOR DIDN'T SAY ANYTHING ABOUT THIS AT THAT TIME\"      Past Surgical History:   Procedure Laterality Date   • BREAST BIOPSY     • BREAST CYST EXCISION      PT UNSURE OF LATERALITY   • COLONOSCOPY  UNKNOWN   • DIAGNOSTIC LAPAROSCOPY     • HYSTERECTOMY     • OOPHORECTOMY     • VENTRAL/INCISIONAL HERNIA REPAIR N/A 12/27/2016    Procedure: OPEN INCISIONAL HERNIA REPAIR WITH MESH;  Surgeon: Maximino Sparks MD;  Location: [facility];  Service:       Family History   Problem Relation Age of Onset   • Diabetes Other    • Heart disease Other    • Hypertension Other    • Heart attack Mother    • Diabetes Father    • Heart attack Father      Social History     Social History   • Marital status:      Spouse name: N/A   • Number of children: N/A   • Years of education: N/A     Occupational History   • Not on file.     Social History Main Topics   • Smoking status: Never Smoker   • Smokeless tobacco: Not on file   • Alcohol use 2.4 oz/week     4 Glasses of wine per week   • Drug use: No   • Sexual activity: Defer     Other Topics Concern   • Not on file     Social History " Narrative      No current outpatient prescriptions on file prior to visit.     No current facility-administered medications on file prior to visit.       Allergies   Allergen Reactions   • Penicillins Hives        Review of Systems   Constitutional: Negative for activity change, appetite change, chills, diaphoresis, fatigue, fever and unexpected weight change.   HENT: Negative for congestion, dental problem, drooling, ear discharge, ear pain, facial swelling, hearing loss, mouth sores, nosebleeds, postnasal drip, rhinorrhea, sinus pressure, sneezing, sore throat, tinnitus, trouble swallowing and voice change.    Eyes: Negative for photophobia, pain, discharge, redness, itching and visual disturbance.   Respiratory: Negative for apnea, cough, choking, chest tightness, shortness of breath, wheezing and stridor.    Cardiovascular: Negative for chest pain, palpitations and leg swelling.   Gastrointestinal: Negative for abdominal distention, abdominal pain, anal bleeding, blood in stool, constipation, diarrhea, nausea, rectal pain and vomiting.   Endocrine: Negative for cold intolerance, heat intolerance, polydipsia, polyphagia and polyuria.   Genitourinary: Negative for decreased urine volume, difficulty urinating, dysuria, enuresis, flank pain, frequency, genital sores, hematuria and urgency.   Musculoskeletal: Positive for arthralgias. Negative for back pain, gait problem, joint swelling, myalgias, neck pain and neck stiffness.   Skin: Negative for color change, pallor, rash and wound.   Allergic/Immunologic: Negative for environmental allergies, food allergies and immunocompromised state.   Neurological: Negative for dizziness, tremors, seizures, syncope, facial asymmetry, speech difficulty, weakness, light-headedness, numbness and headaches.   Hematological: Negative for adenopathy. Does not bruise/bleed easily.   Psychiatric/Behavioral: Negative for agitation, behavioral problems, confusion, decreased  "concentration, dysphoric mood, hallucinations, self-injury, sleep disturbance and suicidal ideas. The patient is not nervous/anxious and is not hyperactive.         Objective      Physical Exam  /84  Pulse 78  Ht 168.3 cm (66.26\")  Wt 87 kg (191 lb 12.8 oz)  BMI 30.71 kg/m2    Body mass index is 30.71 kg/(m^2).    General:   Mental Status:  Alert   Appearance: Cooperative, in no acute distress   Build and Nutrition: Overweight female   Orientation: Alert and oriented to person, place and time   Posture: Normal   Gait: Limping on the left    Integument:   Left hip: No skin lesions, no rash, no ecchymosis    Neurologic:   Sensation:    Left foot: Intact to light touch on the dorsal and plantar aspect   Motor:  Left lower extremity: 5/5 quadriceps, hamstrings, ankle dorsiflexors, and ankle plantar flexors  Vascular:   Left lower extremity: 2+ dorsalis pedis pulse, prompt capillary refill    Lower Extremity:   Left Hip:    Tenderness:  Over the lateral aspect of the hip    Swelling:  None    Crepitus:  None    Atrophy:  None    Range of motion:  External Rotation: 30°       Internal Rotation: 30°, with pain in the groin       Flexion:  100°       Extension:  0°   Instability:  None  Deformities:  None  Functional testing: Positive Stinchfield    No leg length discrepancy    Integument:   Left knee: No skin lesions, no rash, no ecchymosis    Lower Extremities:   Left Knee:    Tenderness:  None    Effusion:  None    Swelling:  None    Crepitus: None    Range of motion:  Extension: 0°       Flexion: 120°  Instability: No varus laxity, no valgus laxity, negative anterior drawer  Deformities:  None      Imaging/Studies      Imaging Results (last 24 hours)     Procedure Component Value Units Date/Time    XR Hip With or Without Pelvis 1 View Left [[id number]] Resulted:  01/29/18 1135     Updated:  01/29/18 1135    Narrative:       Left Hip Radiographs  Indication: left hip pain  Views: low AP pelvis and lateral of the " left hip    Comparison: no prior studies available for review    Findings:   No acute or chronic bony abnormalities with normal alignment.      XR Knee 4+ View Left [[id number]] Resulted:  01/29/18 1211     Updated:  01/29/18 1211    Narrative:       Left Knee Radiographs  Indication: left knee pain  Views: Standing AP's and skiers of both knees, with lateral and sunrise   views of the left knee    Comparison: no prior studies available    Findings:   Mild arthritic changes are seen, with medial joint space narrowing, and   mild patellofemoral spurring.          Assessment and Plan     Samantha was seen today for pain.    Diagnoses and all orders for this visit:    Pain of left hip joint  -     XR Hip With or Without Pelvis 1 View Left  -     XR Knee 4+ View Left  -     MRI Hip Left Without Contrast; Future  -     meloxicam (MOBIC) 7.5 MG tablet; 1 Oral Daily as needed with food        I reviewed my findings with patient today.  She has a limp on the left, and radiographs show no significant bony abnormalities in the left hip joint, but that seems to be the pain generator.  Knee x-rays only show mild arthritis.  At this point, recommended an MRI of the hip, and I will see her back after this completed for review.  I will see her back sooner for any problems.  In the meantime, we will try a prescription of Mobic.    Return for After Imaging Study.      Medical Decision Making  Management Options : prescription/IM medicine  Data/Risk: radiology tests and independent visualization of imaging, lab tests, or EMG/NCV      Chadwick Saenz MD  01/29/18  12:17 PM

## 2018-01-30 ENCOUNTER — TELEPHONE (OUTPATIENT)
Dept: ORTHOPEDIC SURGERY | Facility: CLINIC | Age: 63
End: 2018-01-30

## 2018-01-30 NOTE — TELEPHONE ENCOUNTER
"PT CALLED STATING SHE IS IN MORE PAIN THAN SHE WAS AT HER VISIT YESTERDAY. SHE STATED THAT SHE CAN BARELY WALK AND HER HIP IS \"GIVING OUT\" ON HER. IS THERE ANYTHING SHE CAN DO FOR THIS?  "

## 2018-01-30 NOTE — TELEPHONE ENCOUNTER
I spoke with pt. She stated that her hip has been hurting severely since seeing  yesterday. I advised pt to continue taking her Mobic daily, when resting try shifting weight to other side, pillow between legs and she can ice and/or use heat. Also, I told her that she should not use Ibuprofen in addition to, if needed, alternate between her Mobic and Tylenlol. I checked the status of her MRI, and informed her that it is currently pending review with [employer]. Advised her Central Scheduling dept will contact her to schedule once it has been approved. Pt verbalized understanding.   -TMT 1/30/18

## 2018-02-01 ENCOUNTER — HOSPITAL ENCOUNTER (OUTPATIENT)
Dept: MAMMOGRAPHY | Facility: HOSPITAL | Age: 63
End: 2018-02-01
Attending: RADIOLOGY

## 2018-02-01 ENCOUNTER — TELEPHONE (OUTPATIENT)
Dept: INTERNAL MEDICINE | Facility: CLINIC | Age: 63
End: 2018-02-01

## 2018-02-01 ENCOUNTER — APPOINTMENT (OUTPATIENT)
Dept: MAMMOGRAPHY | Facility: HOSPITAL | Age: 63
End: 2018-02-01
Attending: RADIOLOGY

## 2018-02-01 NOTE — TELEPHONE ENCOUNTER
Pt has also put a phone call in to Dr. Saenz's office. Please see other telephone note and advise if anything further we can tell pt at this point? Thanks.

## 2018-02-01 NOTE — TELEPHONE ENCOUNTER
I put a call to Ortho to see if they were going to do anything for pt or if we needed to try and work her in to schedule. LM for nurse Quinones to call me back.

## 2018-02-01 NOTE — TELEPHONE ENCOUNTER
Was she able to get hold of Dr. Agee office? If not would need to be seen in our office by extender or by me in am

## 2018-02-01 NOTE — TELEPHONE ENCOUNTER
Per  went ahead and scheduled with Dr. Figueroa for tomorrow morning at 8:45 since we have not heard back from Ortho as of yet.

## 2018-02-01 NOTE — TELEPHONE ENCOUNTER
PT SAW DR. BOCANEGRA (ORTHOPEDIC SURGERY) ON Monday.. ON Tuesday SHE HAD AN INCIDENT AND SHE CAN BARELY WALK NOW. SHE WOULD LIKE FOR DR MCGHEE OR SERGE TO GIVE HER A CALL TO ADVISE HER ON WHAT TO DO.     [phone] THANKS.

## 2018-02-01 NOTE — TELEPHONE ENCOUNTER
Ortho returned my call. Pt had been offered to see any of the PA's at their office today and didn't seem to want to do that. Pt was advised per Ortho to go to ER if pain was bad enough and Dr. Saenz was Ortho physician on call tonight. Pt isn't understanding why MRI has not been approved yet and is wanting that process sped up, stated if she went to ER she could have it done quicker. Pt doesn't understand that may be at a higher cost. Ortho office stated office was working on getting that approved as quickly as possible so they could get pt taken care of. I advised Ortho office that we had pt coming in tomorrow morning to see Dr. Figueroa and I would have Dr. Figueroa discuss further actions with her. We thanked each other and ended the call.

## 2018-02-02 ENCOUNTER — HOSPITAL ENCOUNTER (OUTPATIENT)
Dept: MRI IMAGING | Facility: HOSPITAL | Age: 63
Discharge: HOME | End: 2018-02-02
Attending: ORTHOPAEDIC SURGERY | Admitting: ORTHOPAEDIC SURGERY

## 2018-02-02 ENCOUNTER — TELEPHONE (OUTPATIENT)
Dept: INTERNAL MEDICINE | Facility: CLINIC | Age: 63
End: 2018-02-02

## 2018-02-02 ENCOUNTER — APPOINTMENT (OUTPATIENT)
Dept: MRI IMAGING | Facility: HOSPITAL | Age: 63
End: 2018-02-02
Attending: ORTHOPAEDIC SURGERY

## 2018-02-02 DIAGNOSIS — M25.552 PAIN OF LEFT HIP JOINT: ICD-10-CM

## 2018-02-02 PROCEDURE — 73721 MRI JNT OF LWR EXTRE W/O DYE: CPT

## 2018-02-02 NOTE — TELEPHONE ENCOUNTER
PATIENT WOULD LIKE A RETURN CALL REGARDING SHOOTING PAIN IN LEG, SHE WAS TOLD BY DR BOCANEGRA THAT HE BELIEVES THIS MAY BE NERVE RELATED. SHE HAD AN MRI DONE THIS MORNING. SHE WOULD LIKE TO SPEAK WITH DR MCGHEE ABOUT THIS IF SHE HAS TIME.    CALL BACK [phone]

## 2018-02-02 NOTE — TELEPHONE ENCOUNTER
Advised pt of MD comment. She states no swelling. She states she has had pain all over and going on for a while now. She travels for work and states she feels like she's been hobbling for the past week. On Tuesday she couldn't put any weight at all on it. She states today is ok, she can put a little bit of weight on it but still cannot walk on it. Pt thinks it may be her sciatica?

## 2018-02-06 ENCOUNTER — OFFICE VISIT (OUTPATIENT)
Dept: INTERNAL MEDICINE | Facility: CLINIC | Age: 63
End: 2018-02-06

## 2018-02-06 ENCOUNTER — HOSPITAL ENCOUNTER (OUTPATIENT)
Dept: GENERAL RADIOLOGY | Facility: HOSPITAL | Age: 63
Discharge: HOME | End: 2018-02-06
Attending: INTERNAL MEDICINE | Admitting: INTERNAL MEDICINE

## 2018-02-06 VITALS
HEART RATE: 70 BPM | OXYGEN SATURATION: 99 % | DIASTOLIC BLOOD PRESSURE: 84 MMHG | SYSTOLIC BLOOD PRESSURE: 146 MMHG | WEIGHT: 197 LBS | BODY MASS INDEX: 31.66 KG/M2 | HEIGHT: 66 IN

## 2018-02-06 DIAGNOSIS — M25.552 LEFT HIP PAIN: ICD-10-CM

## 2018-02-06 DIAGNOSIS — M25.552 LEFT HIP PAIN: Primary | ICD-10-CM

## 2018-02-06 DIAGNOSIS — M79.605 LEFT LEG PAIN: ICD-10-CM

## 2018-02-06 PROCEDURE — 99214 OFFICE O/P EST MOD 30 MIN: CPT | Performed by: INTERNAL MEDICINE

## 2018-02-06 PROCEDURE — 72100 X-RAY EXAM L-S SPINE 2/3 VWS: CPT

## 2018-02-06 NOTE — PROGRESS NOTES
"Subjective   Samantha Gaines is a 62 y.o. female.     History of Present Illness   Left hip pain that started in Nov but was mild. Had mri that showed mild arthritis. Seen by ortho recently. Over last week pain was significant.  Pain severe enough that was not able to walk.  Tues, Wed, Thursday, and Friday did not walk on it at all.  Has been taking Mobic.  Pain is improving but more she walks the worst it gets.   The following portions of the patient's history were reviewed and updated as appropriate: allergies, current medications, past family history, past medical history, past social history, past surgical history and problem list.    Review of Systems   Constitutional: Negative for activity change, appetite change, chills, diaphoresis, fatigue, fever and unexpected weight change.   HENT: Negative for congestion, ear discharge, ear pain, mouth sores, nosebleeds, sinus pressure, sneezing and sore throat.    Eyes: Negative for pain, discharge and itching.   Respiratory: Negative for cough, chest tightness, shortness of breath and wheezing.    Cardiovascular: Negative for chest pain, palpitations and leg swelling.   Gastrointestinal: Negative for abdominal pain, constipation, diarrhea, nausea and vomiting.   Endocrine: Negative for cold intolerance, heat intolerance, polydipsia and polyphagia.   Genitourinary: Negative for dysuria, flank pain, frequency, hematuria and urgency.   Musculoskeletal: Negative for arthralgias, back pain, gait problem, myalgias, neck pain and neck stiffness.        Hip and leg pain   Skin: Negative for color change, pallor and rash.   Neurological: Negative for seizures, speech difficulty, numbness and headaches.   Psychiatric/Behavioral: Negative for agitation, confusion, decreased concentration and sleep disturbance. The patient is not nervous/anxious.      /84  Pulse 70  Ht 167.6 cm (66\")  Wt 89.4 kg (197 lb)  SpO2 99%  BMI 31.8 kg/m2    Objective   Physical Exam "   Constitutional: She appears well-developed.   HENT:   Head: Normocephalic.   Right Ear: External ear normal.   Left Ear: External ear normal.   Nose: Nose normal.   Mouth/Throat: Oropharynx is clear and moist.   Eyes: Conjunctivae are normal. Pupils are equal, round, and reactive to light.   Neck: No JVD present. No thyromegaly present.   Cardiovascular: Normal rate, regular rhythm and normal heart sounds.  Exam reveals no friction rub.    No murmur heard.  Pulmonary/Chest: Effort normal and breath sounds normal. No respiratory distress. She has no wheezes. She has no rales.   Abdominal: Soft. Bowel sounds are normal. She exhibits no distension. There is no tenderness. There is no guarding.   Musculoskeletal: She exhibits no edema or tenderness.   Lymphadenopathy:     She has no cervical adenopathy.   Neurological: She displays normal reflexes. No cranial nerve deficit.   Skin: No rash noted.   Psychiatric: Her behavior is normal.   Nursing note and vitals reviewed.      Assessment/Plan   Samantha was seen today for leg pain.    Diagnoses and all orders for this visit:    Left hip pain  -     XR Spine Lumbar 2 or 3 View; Future    Left leg pain  -     Duplex Venous Lower Extremity - Left CAR; Future

## 2018-02-09 ENCOUNTER — TELEPHONE (OUTPATIENT)
Dept: INTERNAL MEDICINE | Facility: CLINIC | Age: 63
End: 2018-02-09

## 2018-02-09 DIAGNOSIS — M19.90 ARTHRITIS: Primary | ICD-10-CM

## 2018-02-09 NOTE — TELEPHONE ENCOUNTER
----- Message from Julia Figueroa DO sent at 2/9/2018  3:31 PM EST -----  Call and let know mild arthritis of back.  She can start PT 2 x a week x 6 weeks. Send her order. Needs 6 week f/u

## 2018-02-14 ENCOUNTER — HOSPITAL ENCOUNTER (OUTPATIENT)
Dept: MAMMOGRAPHY | Facility: HOSPITAL | Age: 63
Discharge: HOME | End: 2018-02-14

## 2018-02-14 ENCOUNTER — HOSPITAL ENCOUNTER (OUTPATIENT)
Dept: MAMMOGRAPHY | Facility: HOSPITAL | Age: 63
Discharge: HOME | End: 2018-02-14
Attending: RADIOLOGY | Admitting: RADIOLOGY

## 2018-02-14 ENCOUNTER — TRANSCRIBE ORDERS (OUTPATIENT)
Dept: MAMMOGRAPHY | Facility: HOSPITAL | Age: 63
End: 2018-02-14

## 2018-02-14 ENCOUNTER — HOSPITAL ENCOUNTER (OUTPATIENT)
Dept: MAMMOGRAPHY | Facility: HOSPITAL | Age: 63
Discharge: HOME | End: 2018-02-14
Attending: RADIOLOGY

## 2018-02-14 DIAGNOSIS — R92.8 ABNORMAL MAMMOGRAM: ICD-10-CM

## 2018-02-14 DIAGNOSIS — R92.8 ABNORMAL MAMMOGRAM: Primary | ICD-10-CM

## 2018-02-14 PROCEDURE — A4648 IMPLANTABLE TISSUE MARKER: HCPCS

## 2018-02-14 PROCEDURE — 19081 BX BREAST 1ST LESION STRTCTC: CPT | Performed by: RADIOLOGY

## 2018-02-14 PROCEDURE — 88305 TISSUE EXAM BY PATHOLOGIST: CPT | Performed by: RADIOLOGY

## 2018-02-14 PROCEDURE — 19082 BX BREAST ADD LESION STRTCTC: CPT | Performed by: RADIOLOGY

## 2018-02-14 PROCEDURE — 76098 X-RAY EXAM SURGICAL SPECIMEN: CPT

## 2018-02-14 PROCEDURE — 77065 DX MAMMO INCL CAD UNI: CPT | Performed by: RADIOLOGY

## 2018-02-14 RX ORDER — LIDOCAINE HYDROCHLORIDE AND EPINEPHRINE 10; 10 MG/ML; UG/ML
20 INJECTION, SOLUTION INFILTRATION; PERINEURAL ONCE
Status: COMPLETED | OUTPATIENT
Start: 2018-02-14 | End: 2018-02-14

## 2018-02-14 RX ORDER — LIDOCAINE HYDROCHLORIDE 10 MG/ML
5 INJECTION, SOLUTION INFILTRATION; PERINEURAL ONCE
Status: COMPLETED | OUTPATIENT
Start: 2018-02-14 | End: 2018-02-14

## 2018-02-14 RX ADMIN — LIDOCAINE HYDROCHLORIDE 5 ML: 10 INJECTION, SOLUTION INFILTRATION; PERINEURAL at 10:06

## 2018-02-14 RX ADMIN — LIDOCAINE HYDROCHLORIDE 5 ML: 10 INJECTION, SOLUTION INFILTRATION; PERINEURAL at 10:42

## 2018-02-14 RX ADMIN — LIDOCAINE HYDROCHLORIDE AND EPINEPHRINE 14 ML: 10; 10 INJECTION, SOLUTION INFILTRATION; PERINEURAL at 10:44

## 2018-02-14 RX ADMIN — LIDOCAINE HYDROCHLORIDE AND EPINEPHRINE 14 ML: 10; 10 INJECTION, SOLUTION INFILTRATION; PERINEURAL at 10:10

## 2018-02-15 LAB
CYTO UR: NORMAL
LAB AP CASE REPORT: NORMAL
LAB AP CLINICAL INFORMATION: NORMAL
LAB AP DIAGNOSIS COMMENT: NORMAL
Lab: NORMAL
PATH REPORT.FINAL DX SPEC: NORMAL
PATH REPORT.GROSS SPEC: NORMAL

## 2018-02-16 ENCOUNTER — TELEPHONE (OUTPATIENT)
Dept: MAMMOGRAPHY | Facility: HOSPITAL | Age: 63
End: 2018-02-16

## 2018-02-16 NOTE — TELEPHONE ENCOUNTER
02.16.18 @ 1255: Pt notified of pathology results and recommendations. Verbalizes understanding. Denies discomfort. Denies any signs and symptoms of infection.

## 2018-02-27 ENCOUNTER — TRANSCRIBE ORDERS (OUTPATIENT)
Dept: PHYSICAL THERAPY | Facility: HOSPITAL | Age: 63
End: 2018-02-27

## 2018-02-27 DIAGNOSIS — M19.90 ARTHRITIS: Primary | ICD-10-CM

## 2018-03-07 ENCOUNTER — HOSPITAL ENCOUNTER (OUTPATIENT)
Dept: PHYSICAL THERAPY | Facility: HOSPITAL | Age: 63
Setting detail: THERAPIES SERIES
Discharge: HOME | End: 2018-03-07
Attending: INTERNAL MEDICINE

## 2018-03-07 DIAGNOSIS — Z74.09 IMPAIRED FUNCTIONAL MOBILITY, BALANCE, GAIT, AND ENDURANCE: Primary | ICD-10-CM

## 2018-03-07 DIAGNOSIS — M19.90 ARTHRITIS: ICD-10-CM

## 2018-03-07 PROCEDURE — 97161 PT EVAL LOW COMPLEX 20 MIN: CPT | Performed by: PHYSICAL THERAPIST

## 2018-03-08 NOTE — THERAPY EVALUATION
"    Outpatient Physical Therapy Ortho Initial Evaluation  [facility]     Patient Name: Samantha Gaines  : 1955  MRN: [id number]  Today's Date: 3/8/2018      Visit Date: 2018    Patient Active Problem List   Diagnosis   • Diverticulitis of colon   • Hyperlipidemia   • Vitamin D deficiency   • Incisional hernia        Past Medical History:   Diagnosis Date   • Breast cyst    • Diverticulosis    • Essential hypertriglyceridemia    • PONV (postoperative nausea and vomiting)    • Vitamin D deficiency     \"HAD BLOODWORK RECENTLY AND MY DOCTOR DIDN'T SAY ANYTHING ABOUT THIS AT THAT TIME\"        Past Surgical History:   Procedure Laterality Date   • BREAST BIOPSY     • BREAST CYST EXCISION      PT UNSURE OF LATERALITY   • COLONOSCOPY  UNKNOWN   • DIAGNOSTIC LAPAROSCOPY     • HYSTERECTOMY     • OOPHORECTOMY     • VENTRAL/INCISIONAL HERNIA REPAIR N/A 2016    Procedure: OPEN INCISIONAL HERNIA REPAIR WITH MESH;  Surgeon: Maximino Sparks MD;  Location: [facility];  Service:        Visit Dx:     ICD-10-CM ICD-9-CM   1. Impaired functional mobility, balance, gait, and endurance Z74.09 V49.89   2. Arthritis M19.90 716.90             Patient History       18 1600          History    Chief Complaint Pain  -CP      Type of Pain Lower Extremity / Leg  -CP      Date Current Problem(s) Began 18  -CP      Brief Description of Current Complaint Pt states she has been stiff since , has decreased activity. She states XRay revealed arthritis. She recalls one day had shooting pain down L leg, shooting pain, unable to walk for 3 days. Pt used transport chair to get around in house. Pt states that severe pain resolved with walking, continues to have pain in left hip, knee, and ankle. Can get shooting pain in L leg, described as sciatica related pain. She has h/o hernia surgery repair 1 year ago, hysterectomy 10+ years ago. Pt states her balance has slowly decreased too, feels off balance walking at times, " using handrail more often.   -CP      Patient/Caregiver Goals Relieve pain;Know what to do to help the symptoms;Improve strength;Improve mobility  -CP      Current Tobacco Use no  -CP      Smoking Status no  -CP      Hand Dominance right-handed  -CP      Occupation/sports/leisure activities Like to walk but has not been doing it d/t pain. Pt works at computer/desk job, sedentary lifestyle. Central office. Pt lives in private home, has 2 story home, pain with stairs. .   -CP      Patient seeing anyone else for problem(s)? Yes  -CP      How has patient tried to help current problem? rest, NSAIDs  -CP      What clinical tests have you had for this problem? X-ray  -CP      Results of Clinical Tests degenerative changes hip. No MRI of lumbar spine  -CP      Are you or can you be pregnant No  -CP      Pain     Pain Location Back;Hip  -CP      Pain at Present 1  -CP      Pain at Best 1;0  -CP      Pain at Worst 4  -CP      Pain Frequency Intermittent  -CP      Pain Description Aching;Dull;Tightness  -CP      Is your sleep disturbed? Yes  -CP      Is medication used to assist with sleep? No  -CP      What position do you sleep in? Right sidelying;Supine  -CP      Difficulties with ADL's? pain with tub transfer, stairs, and walking, prolonged sitting.   -CP      Fall Risk Assessment    Any falls in the past year: No  -CP      Daily Activities    Primary Language English  -CP      Are you able to read Yes  -CP      Are you able to write Yes  -CP      Pt Participated in POC and Goals Yes  -CP      Safety    Are you being hurt, hit, or frightened by anyone at home or in your life? No  -CP        User Key  (r) = Recorded By, (t) = Taken By, (c) = Cosigned By    Initials Name Provider Type    CP Corinne E Perkins, PT Physical Therapist                PT Ortho       03/07/18 1600    Subjective Comments    Subjective Comments Pt presents with c/o left leg weakness, pain, and low back pain.   -CP    Subjective Pain    Able to  rate subjective pain? yes  -CP    Pre-Treatment Pain Level 1  -CP    Post-Treatment Pain Level 1  -CP    Special Tests/Palpation    Special Tests/Palpation Lumbar/SI  -CP    Lumbosacral Palpation    Lumbosacral Segment Left:;Tender  -CP    Piriformis Left:;Guarded/taut  -CP    Lumbosacral Palpation? Yes  -CP    Lumbar/SI Special Tests    Slump Test (Neural Tension) Negative  -CP    Neva Poncho Test (HNP) Negative  -CP    SLR (Neural Tension) Bilateral:;Negative  -CP    TYRELL (hip vs. SI Dysfunction) Negative  -CP    Sacral Spring Test (SI Dysfunction) Negative  -CP    Special Lumbosacral Questions    Have you had any abdominal surgeries? Yes  -CP    Do you have pain going up/down stairs? Yes  -CP    ROM (Range of Motion)    Additional Documentation Trunk (Group)  -CP    Trunk    Flexion AROM Deficit 52  -CP    Extension AROM Deficit 25  -CP    Lt Lat Flexion AROM Deficit 33  -CP    Right Lateral Flexion AROM Deficit 33  -CP    Lt Rotation AROM Deficit WFL  -CP    Right Rotation AROM Deficit WFL  -CP    MMT (Manual Muscle Testing)    General MMT Assessment lower extremity strength deficits identified  -CP    Left Hip    Hip Flexion Gross Movement (4-/5) good minus  -CP    Hip ABduction Gross Movement (4/5) good  -CP    Hip ADduction Gross Movement (4-/5) good minus  -CP    Right Hip    Hip Flexion Gross Movement (4+/5) good plus  -CP    Hip ABduction Gross Movement (4+/5) good plus  -CP    Hip ADduction Gross Movement (4/5) good  -CP    Lower Extremity    Lower Ext Manual Muscle Testing left hip strength deficit;right hip strength deficit;left knee strength deficit;right knee strength deficit;left ankle strength deficit;right ankle strength deficit  -CP    Left Knee    Knee Extension Gross Movement (4-/5) good minus  -CP    Knee Flexion Gross Movement (4+/5) good plus  -CP    Right Knee    Knee Extension Gross Movement (4+/5) good plus  -CP    Knee Flexion Gross Movement (4+/5) good plus  -CP    Left Ankle/Foot     Ankle Dorsiflexion Gross Movement (5/5) normal  -CP    Right Ankle/Foot    Ankle Dorsiflexion Gross Movement (5/5) normal  -CP    Flexibility    Flexibility Tested? Lower Extremity  -CP    Lower Extremity Flexibility    Hamstrings Left:;Mildly limited  -CP    Transfers    Transfer, Comment Pain noted with functional transfers, especially lower height.   -CP    Gait Assessment/Treatment    Gait, Floyd Level independent  -CP    Gait, Comment Pt ambulated w/ step through gait pattern, decreased step length and arm swing. Lateral trunk flexion noted with trendelenburg gait.  -CP    Stairs Assessment/Treatment    Number of Stairs 5  -CP    Stairs, Handrail Location right side (ascending)  -CP    Stairs, Floyd Level independent  -CP    Stairs, Technique Used step over step (ascending);step over step (descending)  -CP    Stairs, Comment Inconsistent gait noted with stairs both step to and step through gait.   -CP      User Key  (r) = Recorded By, (t) = Taken By, (c) = Cosigned By    Initials Name Provider Type    CP Corinne E Perkins, PT Physical Therapist                                  PT OP Goals       03/07/18 1700       PT Short Term Goals    STG Date to Achieve 03/22/18  -CP     STG 1 Patient subjectively report that altered left leg symptoms have decreased by 50% with frequency or intensity.  -CP     STG 1 Progress New  -CP     STG 2 Patient is independent with HEP for flexibility and strengthening.  -CP     STG 2 Progress New  -CP     Long Term Goals    LTG Date to Achieve 04/07/18  -CP     LTG 1 Patient will demonstrate lumbar AROM WNL in all planes to improve ability to perform daily functional activities.  -CP     LTG 1 Progress New  -CP     LTG 2 Modified Oswestry score is improved by at least 10%.  -CP     LTG 2 Progress New  -CP     LTG 3 Patient is independent with long term HEP for improved postural awareness and stabilization.  -CP     LTG 3 Progress New  -CP     LTG 4 Patient is able to  tolerate at least 30 min of standing or walking to improved tolerance to ADLs.  -CP     LTG 4 Progress New  -CP     Time Calculation    PT Goal Re-Cert Due Date 06/05/18  -CP       User Key  (r) = Recorded By, (t) = Taken By, (c) = Cosigned By    Initials Name Provider Type    CP Corinne E Perkins, PT Physical Therapist                      Exercises       03/07/18 1600          Subjective Comments    Subjective Comments Pt presents with c/o left leg weakness, pain, and low back pain.   -CP      Subjective Pain    Able to rate subjective pain? yes  -CP      Pre-Treatment Pain Level 1  -CP      Post-Treatment Pain Level 1  -CP        User Key  (r) = Recorded By, (t) = Taken By, (c) = Cosigned By    Initials Name Provider Type    CP Corinne E Perkins, PT Physical Therapist                        Outcome Measure Options: Modifed Owestry  Modified Oswestry  Modified Oswestry Score/Comments: 26%      Time Calculation:   Start Time: 1630     Therapy Charges for Today     Code Description Service Date Service Provider Modifiers Qty    74873007196 HC PT EVAL LOW COMPLEXITY 4 3/7/2018 Corinne E Perkins, PT GP 1          PT G-Codes  Outcome Measure Options: Modifed Owestry         Corinne E. Perkins, PT  3/8/2018

## 2018-03-12 ENCOUNTER — HOSPITAL ENCOUNTER (OUTPATIENT)
Dept: PHYSICAL THERAPY | Facility: HOSPITAL | Age: 63
Setting detail: THERAPIES SERIES
Discharge: HOME | End: 2018-03-12
Attending: INTERNAL MEDICINE

## 2018-03-12 DIAGNOSIS — M19.90 ARTHRITIS: Primary | ICD-10-CM

## 2018-03-12 DIAGNOSIS — Z74.09 IMPAIRED FUNCTIONAL MOBILITY, BALANCE, GAIT, AND ENDURANCE: ICD-10-CM

## 2018-03-12 PROCEDURE — 97110 THERAPEUTIC EXERCISES: CPT | Performed by: PHYSICAL THERAPIST

## 2018-03-12 NOTE — THERAPY TREATMENT NOTE
"    Outpatient Physical Therapy Ortho Treatment Note  [facility]     Patient Name: Samantha Gaines  : 1955  MRN: [id number]  Today's Date: 3/12/2018      Visit Date: 2018    Visit Dx:    ICD-10-CM ICD-9-CM   1. Arthritis M19.90 716.90   2. Impaired functional mobility, balance, gait, and endurance Z74.09 V49.89       Patient Active Problem List   Diagnosis   • Diverticulitis of colon   • Hyperlipidemia   • Vitamin D deficiency   • Incisional hernia        Past Medical History:   Diagnosis Date   • Breast cyst    • Diverticulosis    • Essential hypertriglyceridemia    • PONV (postoperative nausea and vomiting)    • Vitamin D deficiency     \"HAD BLOODWORK RECENTLY AND MY DOCTOR DIDN'T SAY ANYTHING ABOUT THIS AT THAT TIME\"        Past Surgical History:   Procedure Laterality Date   • BREAST BIOPSY     • BREAST CYST EXCISION      PT UNSURE OF LATERALITY   • COLONOSCOPY  UNKNOWN   • DIAGNOSTIC LAPAROSCOPY     • HYSTERECTOMY     • OOPHORECTOMY     • VENTRAL/INCISIONAL HERNIA REPAIR N/A 2016    Procedure: OPEN INCISIONAL HERNIA REPAIR WITH MESH;  Surgeon: Maximino Sparks MD;  Location: [facility];  Service:                              PT Assessment/Plan     Row Name 18 1600          PT Assessment    Assessment Comments Pt tolerated initial ther ex in clinic after verbal cues and demo from PT for correct techniques. She denies pain with hooklying ther ex, educated on written HEP after demonstrating understanding in clinic.   -CP        PT Plan    PT Plan Comments Assess response from ther ex in clinic and compliance with progressed HEP.  -CP       User Key  (r) = Recorded By, (t) = Taken By, (c) = Cosigned By    Initials Name Provider Type    CP Corinne E Perkins, PT Physical Therapist                    Exercises     Row Name 18 1600             Subjective Comments    Subjective Comments Pt denies low back pain on arrival, states left hip pain is minimal but did notice it while sleeping " this weekend, rated 2/10 L hip.   -CP         Subjective Pain    Able to rate subjective pain? yes  -CP      Pre-Treatment Pain Level 1   0.5  -CP         Exercise 1    Exercise Name 1 Nustep Level 5  -CP      Cueing 1 Verbal  -CP      Time 1 6  -CP         Exercise 2    Exercise Name 2 3 way hip standing in // bars  -CP      Cueing 2 Verbal;Demo  -CP      Reps 2 8 each  -CP         Exercise 3    Exercise Name 3 Standing heel raises  -CP      Cueing 3 Verbal  -CP      Reps 3 15  -CP         Exercise 4    Exercise Name 4 Step ups onto 1 riser forward with hand rails  -CP      Cueing 4 Verbal  -CP      Reps 4 10 each  -CP         Exercise 5    Exercise Name 5 Hooklying hip add iso with small blue therapy ball  -CP      Cueing 5 Verbal  -CP      Reps 5 10  -CP      Additional Comments held 2 seconds, vc's for pelvic floor activation   -CP         Exercise 6    Exercise Name 6 hip abd clams supine  -CP      Cueing 6 Verbal  -CP      Reps 6 15  -CP      Additional Comments GTB above knees   -CP         Exercise 7    Exercise Name 7 Glute set -> bridge  -CP      Reps 7 5x each, 10 total  -CP         Exercise 8    Exercise Name 8 SLR  -CP      Cueing 8 Verbal  -CP      Reps 8 10  -CP         Exercise 9    Exercise Name 9 LTR  -CP      Cueing 9 Verbal  -CP      Reps 9 8  -CP      Additional Comments using red therapy ball  -CP         Exercise 10    Exercise Name 10 piriformis stretch seated  -CP      Cueing 10 Verbal  -CP      Reps 10 2  -CP        User Key  (r) = Recorded By, (t) = Taken By, (c) = Cosigned By    Initials Name Provider Type    CP Corinne E Perkins, PT Physical Therapist                             Therapy Education  Education Details: HEP; included LTR, hip add iso, hip abd clams with GTB, bridges, piriformis stretch, and SKTC. Pt given green theraband.   Given: HEP  Program: New  How Provided: Verbal, Demonstration, Written  Provided to: Patient  Level of Understanding: Verbalized, Demonstrated               Time Calculation:   Start Time: 1642  Total Timed Code Minutes- PT: 31 minute(s)    Therapy Charges for Today     Code Description Service Date Service Provider Modifiers Qty    92421363888 HC PT THER PROC EA 15 MIN 3/12/2018 Corinne E Perkins, PT GP 2                    Corinne E. Perkins, PT  3/12/2018

## 2018-03-14 ENCOUNTER — HOSPITAL ENCOUNTER (OUTPATIENT)
Dept: PHYSICAL THERAPY | Facility: HOSPITAL | Age: 63
Setting detail: THERAPIES SERIES
Discharge: HOME | End: 2018-03-14
Attending: INTERNAL MEDICINE

## 2018-03-14 DIAGNOSIS — Z74.09 IMPAIRED FUNCTIONAL MOBILITY, BALANCE, GAIT, AND ENDURANCE: ICD-10-CM

## 2018-03-14 DIAGNOSIS — M19.90 ARTHRITIS: Primary | ICD-10-CM

## 2018-03-14 PROCEDURE — 97110 THERAPEUTIC EXERCISES: CPT | Performed by: PHYSICAL THERAPIST

## 2018-03-14 NOTE — THERAPY TREATMENT NOTE
"    Outpatient Physical Therapy Ortho Treatment Note  [facility]     Patient Name: Samantha Gaines  : 1955  MRN: [id number]  Today's Date: 3/14/2018      Visit Date: 2018    Visit Dx:    ICD-10-CM ICD-9-CM   1. Arthritis M19.90 716.90   2. Impaired functional mobility, balance, gait, and endurance Z74.09 V49.89       Patient Active Problem List   Diagnosis   • Diverticulitis of colon   • Hyperlipidemia   • Vitamin D deficiency   • Incisional hernia        Past Medical History:   Diagnosis Date   • Breast cyst    • Diverticulosis    • Essential hypertriglyceridemia    • PONV (postoperative nausea and vomiting)    • Vitamin D deficiency     \"HAD BLOODWORK RECENTLY AND MY DOCTOR DIDN'T SAY ANYTHING ABOUT THIS AT THAT TIME\"        Past Surgical History:   Procedure Laterality Date   • BREAST BIOPSY     • BREAST CYST EXCISION      PT UNSURE OF LATERALITY   • COLONOSCOPY  UNKNOWN   • DIAGNOSTIC LAPAROSCOPY     • HYSTERECTOMY     • OOPHORECTOMY     • VENTRAL/INCISIONAL HERNIA REPAIR N/A 2016    Procedure: OPEN INCISIONAL HERNIA REPAIR WITH MESH;  Surgeon: Maximino Sparks MD;  Location: [facility];  Service:                              PT Assessment/Plan     Row Name 18 1100          PT Assessment    Assessment Comments Pt denies pain with ther exercises, stiffness with hip ER. She verbalized compliance with HEP.   -CP        PT Plan    PT Plan Comments Continue with progression of standing CKC strengthening ther ex as tolerated.   -CP       User Key  (r) = Recorded By, (t) = Taken By, (c) = Cosigned By    Initials Name Provider Type    CP Corinne E Perkins, PT Physical Therapist                    Exercises     Row Name 18 1100             Subjective Comments    Subjective Comments Pt states she was able to perform HEP and denies change of pain since last visit.   -CP         Subjective Pain    Able to rate subjective pain? yes  -CP      Pre-Treatment Pain Level 1  -CP      Post-Treatment " Pain Level 0  -CP         Exercise 1    Exercise Name 1 Nustep Level 5  -CP      Time 1 5  -CP         Exercise 2    Exercise Name 2 3 way hip standing in // bars  -CP      Cueing 2 Verbal  -CP      Reps 2 10x each  -CP         Exercise 3    Exercise Name 3 Standing heel raises  -CP      Reps 3 15  -CP         Exercise 4    Exercise Name 4 Step ups onto 1 riser forward with hand rails  -CP      Reps 4 12x each  -CP         Exercise 5    Exercise Name 5 Hooklying hip add iso with small blue therapy ball  -CP      Reps 5 12  -CP      Additional Comments blue therapy ball  -CP         Exercise 6    Exercise Name 6 hip abd clams sidelying  -CP      Cueing 6 Verbal  -CP      Reps 6 10  -CP      Additional Comments GTB  -CP         Exercise 7    Exercise Name 7 Glute set -> bridge  -CP      Reps 7 10  -CP         Exercise 8    Exercise Name 8 SLR  -CP      Cueing 8 Verbal  -CP      Reps 8 12  -CP         Exercise 9    Exercise Name 9 Side stepping, zig zag step with RTB above knees  -CP      Cueing 9 Demo;Verbal  -CP      Reps 9 2   each 30 ft x 2  -CP         Exercise 10    Exercise Name 10 piriformis stretch seated  -CP      Cueing 10 Verbal  -CP      Reps 10 2  -CP         Exercise 11    Exercise Name 11 TG squats  -CP      Cueing 11 Verbal;Demo  -CP      Time 11 1  -CP         Exercise 12    Exercise Name 12 Sit-stands, hips higher than knees, RTB above knees for hip abd iso  -CP      Cueing 12 Demo  -CP      Reps 12 10  -CP        User Key  (r) = Recorded By, (t) = Taken By, (c) = Cosigned By    Initials Name Provider Type    CP Corinne E Perkins, PT Physical Therapist                        Manual Rx (last 36 hours)      Manual Treatments     Row Name 03/14/18 1100             Manual Rx 1    Manual Rx 1 Location LLE  -CP      Manual Rx 1 Type LAD from knee, LAD from prox hip  -CP        User Key  (r) = Recorded By, (t) = Taken By, (c) = Cosigned By    Initials Name Provider Type    CP Corinne E Perkins, DORINDA Physical  Therapist                             Time Calculation:   Start Time: 1120  Total Timed Code Minutes- PT: 38 minute(s)    Therapy Charges for Today     Code Description Service Date Service Provider Modifiers Qty    22063170892 HC PT THER PROC EA 15 MIN 3/14/2018 Corinne E Perkins, PT GP 3                    Corinne E. Perkins, PT  3/14/2018

## 2018-03-19 ENCOUNTER — HOSPITAL ENCOUNTER (OUTPATIENT)
Dept: PHYSICAL THERAPY | Facility: HOSPITAL | Age: 63
Setting detail: THERAPIES SERIES
Discharge: HOME | End: 2018-03-19
Attending: INTERNAL MEDICINE

## 2018-03-19 DIAGNOSIS — Z74.09 IMPAIRED FUNCTIONAL MOBILITY, BALANCE, GAIT, AND ENDURANCE: ICD-10-CM

## 2018-03-19 DIAGNOSIS — M19.90 ARTHRITIS: Primary | ICD-10-CM

## 2018-03-19 PROCEDURE — 97110 THERAPEUTIC EXERCISES: CPT | Performed by: PHYSICAL THERAPIST

## 2018-03-19 NOTE — THERAPY TREATMENT NOTE
"    Outpatient Physical Therapy Ortho Treatment Note  [facility]     Patient Name: Samantha Gaines  : 1955  MRN: [id number]  Today's Date: 3/19/2018      Visit Date: 2018    Visit Dx:    ICD-10-CM ICD-9-CM   1. Arthritis M19.90 716.90   2. Impaired functional mobility, balance, gait, and endurance Z74.09 V49.89       Patient Active Problem List   Diagnosis   • Diverticulitis of colon   • Hyperlipidemia   • Vitamin D deficiency   • Incisional hernia        Past Medical History:   Diagnosis Date   • Breast cyst    • Diverticulosis    • Essential hypertriglyceridemia    • PONV (postoperative nausea and vomiting)    • Vitamin D deficiency     \"HAD BLOODWORK RECENTLY AND MY DOCTOR DIDN'T SAY ANYTHING ABOUT THIS AT THAT TIME\"        Past Surgical History:   Procedure Laterality Date   • BREAST BIOPSY     • BREAST CYST EXCISION      PT UNSURE OF LATERALITY   • COLONOSCOPY  UNKNOWN   • DIAGNOSTIC LAPAROSCOPY     • HYSTERECTOMY     • OOPHORECTOMY     • VENTRAL/INCISIONAL HERNIA REPAIR N/A 2016    Procedure: OPEN INCISIONAL HERNIA REPAIR WITH MESH;  Surgeon: Maximino Sparks MD;  Location: [facility];  Service:                              PT Assessment/Plan     Row Name 18 3547          PT Assessment    Assessment Comments Pt is progressing with spinal stabilization and hip strengthening ther ex as tolerated. She verbalized improved pain mgmt on arrival and demonstrated improved LLE ER flexibility. Pt educated on deep abd breathing for improved core activation.   -CP        PT Plan    PT Plan Comments Progress core strengthening and spinal stabilization as tolerated.   -CP       User Key  (r) = Recorded By, (t) = Taken By, (c) = Cosigned By    Initials Name Provider Type    CP Corinne E Perkins, PT Physical Therapist                    Exercises     Row Name 18 1705             Subjective Comments    Subjective Comments Pt reports she is feeling good, denies pain on arrival, and states she " has been consistently doing HEP.   -CP         Subjective Pain    Able to rate subjective pain? yes  -CP      Pre-Treatment Pain Level 0  -CP      Post-Treatment Pain Level 0  -CP         Exercise 1    Exercise Name 1 Nustep Level 5  -CP      Time 1 8  -CP      Additional Comments total ther ex time: 39 min, see note.   -CP         Exercise 2    Exercise Name 2 3 way hip standing in // bars  -CP      Reps 2 10x each  -CP         Exercise 3    Exercise Name 3 Standing heel raises  -CP      Reps 3 15  -CP         Exercise 4    Exercise Name 4 Step ups onto 2 riser forward with hand rail  -CP      Reps 4 10 each  -CP         Exercise 5    Exercise Name 5 Hooklying hip add iso with small blue therapy ball  -CP      Cueing 5 Verbal  -CP      Reps 5 12  -CP      Additional Comments cues for abd drawing in  -CP         Exercise 6    Exercise Name 6 Quadruped hip abd hydrants  -CP      Cueing 6 Verbal  -CP      Reps 6 5  -CP         Exercise 7    Exercise Name 7 Quadruped hip kick backs  -CP      Cueing 7 Verbal  -CP      Reps 7 5   each  -CP         Exercise 8    Exercise Name 8 Deep abdominal breathing supine, holding ab set for 3 seconds  -CP      Cueing 8 Verbal  -CP      Reps 8 10  -CP         Exercise 9    Exercise Name 9 Side stepping, zig zag step with GTB above knees  -CP      Cueing 9 Verbal  -CP      Reps 9 2  -CP         Exercise 10    Exercise Name 10 piriformis stretch seated  -CP      Reps 10 2  -CP         Exercise 11    Exercise Name 11 TG squats  -CP      Time 11 1  -CP         Exercise 12    Exercise Name 12 Sit-stands, hips higher than knees, GTB above knees for hip abd iso  -CP      Reps 12 10  -CP        User Key  (r) = Recorded By, (t) = Taken By, (c) = Cosigned By    Initials Name Provider Type    CP Corinne E Perkins, PT Physical Therapist                        Manual Rx (last 36 hours)      Manual Treatments     Row Name 03/19/18 1700             Manual Rx 1    Manual Rx 1 Location LLE  -CP       Manual Rx 1 Type LAD from knee, LAD from prox hip  -CP        User Key  (r) = Recorded By, (t) = Taken By, (c) = Cosigned By    Initials Name Provider Type    CP Corinne E Perkins, PT Physical Therapist                             Time Calculation:   Start Time: 1715  Total Timed Code Minutes- PT: 41 minute(s)    Therapy Charges for Today     Code Description Service Date Service Provider Modifiers Qty    78616812567  PT THER PROC EA 15 MIN 3/19/2018 Corinne E Perkins, PT GP 3                    Corinne E. Perkins, PT  3/19/2018

## 2018-03-21 ENCOUNTER — HOSPITAL ENCOUNTER (OUTPATIENT)
Dept: PHYSICAL THERAPY | Facility: HOSPITAL | Age: 63
Setting detail: THERAPIES SERIES
Discharge: HOME | End: 2018-03-21
Attending: INTERNAL MEDICINE

## 2018-03-21 DIAGNOSIS — Z74.09 IMPAIRED FUNCTIONAL MOBILITY, BALANCE, GAIT, AND ENDURANCE: ICD-10-CM

## 2018-03-21 DIAGNOSIS — M19.90 ARTHRITIS: Primary | ICD-10-CM

## 2018-03-21 PROCEDURE — 97110 THERAPEUTIC EXERCISES: CPT | Performed by: PHYSICAL THERAPIST

## 2018-03-21 NOTE — THERAPY TREATMENT NOTE
"    Outpatient Physical Therapy Ortho Treatment Note  [facility]     Patient Name: Samantha Gaines  : 1955  MRN: [id number]  Today's Date: 3/21/2018      Visit Date: 2018    Visit Dx:    ICD-10-CM ICD-9-CM   1. Arthritis M19.90 716.90   2. Impaired functional mobility, balance, gait, and endurance Z74.09 V49.89       Patient Active Problem List   Diagnosis   • Diverticulitis of colon   • Hyperlipidemia   • Vitamin D deficiency   • Incisional hernia        Past Medical History:   Diagnosis Date   • Breast cyst    • Diverticulosis    • Essential hypertriglyceridemia    • PONV (postoperative nausea and vomiting)    • Vitamin D deficiency     \"HAD BLOODWORK RECENTLY AND MY DOCTOR DIDN'T SAY ANYTHING ABOUT THIS AT THAT TIME\"        Past Surgical History:   Procedure Laterality Date   • BREAST BIOPSY     • BREAST CYST EXCISION      PT UNSURE OF LATERALITY   • COLONOSCOPY  UNKNOWN   • DIAGNOSTIC LAPAROSCOPY     • HYSTERECTOMY     • OOPHORECTOMY     • VENTRAL/INCISIONAL HERNIA REPAIR N/A 2016    Procedure: OPEN INCISIONAL HERNIA REPAIR WITH MESH;  Surgeon: Maximino Sparks MD;  Location: [facility];  Service:                              PT Assessment/Plan     Row Name 18 1600          PT Assessment    Assessment Comments Pt educated on progressed HEP and given new written copy for home use after demonstrating good technique in clinic. She denies pain during session, verbal cues and demo from PT required for correct technique with additional core and quadruped position ther exercises.   -CP        PT Plan    PT Plan Comments Assess compliance with HEP. Progress in clinic to include trunk extension prone as tolerated along with higher level NMRE balance exercises.   -CP       User Key  (r) = Recorded By, (t) = Taken By, (c) = Cosigned By    Initials Name Provider Type    CP Corinne E Perkins, PT Physical Therapist                    Exercises     Row Name 18 1600             Subjective " Comments    Subjective Comments Pt states she was a little sore after last session but states soreness resolved quickly.   -CP         Subjective Pain    Able to rate subjective pain? yes  -CP      Pre-Treatment Pain Level 0  -CP      Post-Treatment Pain Level 0  -CP         Exercise 1    Exercise Name 1 Nustep Level 6  -CP      Time 1 5  -CP      Additional Comments Total ther ex time, including pt education of new HEP: 43 min  -CP         Exercise 2    Exercise Name 2 3 way hip standing in // bars  -CP      Reps 2 12x   -CP         Exercise 3    Exercise Name 3 Standing heel raises off stepper  -CP      Reps 3 15   riser  -CP         Exercise 4    Exercise Name 4 Quadruped UE reaching, wrist neutral position  -CP      Reps 4 10 each  -CP         Exercise 5    Exercise Name 5 Hooklying hip add iso with 2lb medicine ball  -CP      Reps 5 12  -CP         Exercise 6    Exercise Name 6 Quadruped hip abd hydrants  -CP      Cueing 6 Verbal  -CP      Reps 6 8  -CP         Exercise 7    Exercise Name 7 Quadruped hip kick backs  -CP      Cueing 7 Verbal;Demo  -CP      Reps 7 8  -CP         Exercise 8    Exercise Name 8 Deep abdominal breathing supine, holding ab set for 3 seconds  -CP      Sets 8 2   second set with leg lift (bent)  -CP      Reps 8 10  -CP         Exercise 9    Exercise Name 9 Side stepping, zig zag step with GTB above knees  -CP      Reps 9 2  -CP      Additional Comments green theraband  -CP         Exercise 10    Exercise Name 10 piriformis stretch seated  -CP      Reps 10 2  -CP         Exercise 11    Exercise Name 11 TG squats  -CP      Time 11 1  -CP         Exercise 12    Exercise Name 12 Sit-stands, hips higher than knees, GTB above knees for hip abd iso  -CP      Reps 12 10   2 sets of 10x  -CP         Exercise 13    Exercise Name 13 Lateral hip hike off stepper  -CP      Cueing 13 Verbal;Demo  -CP      Reps 13 10 each  -CP         Exercise 14    Exercise Name 14 Bridge with hip abd iso hold GT   -CP      Cueing 14 Verbal  -CP      Reps 14 12  -CP        User Key  (r) = Recorded By, (t) = Taken By, (c) = Cosigned By    Initials Name Provider Type    CP Corinne E Perkins, PT Physical Therapist                             Therapy Education  Education Details: HEP progressed to include standing 3 way hip, supine bent leg lift for abd, quadruped hip abd hydrants, quadruped hip ext kick backs, sit-stands, side stepping and zigzag stepping.   Given: HEP  Program: Progressed  How Provided: Verbal, Demonstration, Written  Provided to: Patient  Level of Understanding: Verbalized, Demonstrated              Time Calculation:   Start Time: 1630  Total Timed Code Minutes- PT: 43 minute(s)    Therapy Charges for Today     Code Description Service Date Service Provider Modifiers Qty    [id number]  PT THER PROC EA 15 MIN 3/21/2018 Corinne E Perkins, PT GP 3                    Corinne E. Perkins, PT  3/21/2018

## 2018-03-23 ENCOUNTER — OFFICE VISIT (OUTPATIENT)
Dept: INTERNAL MEDICINE | Facility: CLINIC | Age: 63
End: 2018-03-23

## 2018-03-23 VITALS
OXYGEN SATURATION: 99 % | DIASTOLIC BLOOD PRESSURE: 90 MMHG | SYSTOLIC BLOOD PRESSURE: 120 MMHG | HEART RATE: 73 BPM | BODY MASS INDEX: 32.15 KG/M2 | WEIGHT: 199.2 LBS

## 2018-03-23 DIAGNOSIS — M25.552 LEFT HIP PAIN: Primary | ICD-10-CM

## 2018-03-23 PROCEDURE — 99214 OFFICE O/P EST MOD 30 MIN: CPT | Performed by: INTERNAL MEDICINE

## 2018-03-23 NOTE — PROGRESS NOTES
Subjective   Samantha Gaines is a 62 y.o. female.     Chief Complaint   Patient presents with   • Hip Pain       History of Present Illness   Left hip pain for months. Is now doing PT but is getting improvement with PT.  The following portions of the patient's history were reviewed and updated as appropriate: allergies, current medications, past family history, past medical history, past social history, past surgical history and problem list.    Review of Systems   Constitutional: Negative for activity change, appetite change, chills, diaphoresis, fatigue, fever and unexpected weight change.   HENT: Negative for congestion, ear discharge, ear pain, mouth sores, nosebleeds, sinus pressure, sneezing and sore throat.    Eyes: Negative for pain, discharge and itching.   Respiratory: Negative for cough, chest tightness, shortness of breath and wheezing.    Cardiovascular: Negative for chest pain, palpitations and leg swelling.   Gastrointestinal: Negative for abdominal pain, constipation, diarrhea, nausea and vomiting.   Endocrine: Negative for cold intolerance, heat intolerance, polydipsia and polyphagia.   Genitourinary: Negative for dysuria, flank pain, frequency, hematuria and urgency.   Musculoskeletal: Negative for arthralgias, back pain, gait problem, myalgias, neck pain and neck stiffness.        Hip pain   Skin: Negative for color change, pallor and rash.   Neurological: Negative for seizures, speech difficulty, numbness and headaches.   Psychiatric/Behavioral: Negative for agitation, confusion, decreased concentration and sleep disturbance. The patient is not nervous/anxious.      /90   Pulse 73   Wt 90.4 kg (199 lb 3.2 oz)   SpO2 99%   BMI 32.15 kg/m²     Objective   Physical Exam   Constitutional: She appears well-developed.   HENT:   Head: Normocephalic.   Right Ear: External ear normal.   Left Ear: External ear normal.   Nose: Nose normal.   Mouth/Throat: Oropharynx is clear and moist.   Eyes:  Conjunctivae are normal. Pupils are equal, round, and reactive to light.   Neck: No JVD present. No thyromegaly present.   Cardiovascular: Normal rate, regular rhythm and normal heart sounds.  Exam reveals no friction rub.    No murmur heard.  Pulmonary/Chest: Effort normal and breath sounds normal. No respiratory distress. She has no wheezes. She has no rales.   Abdominal: Soft. Bowel sounds are normal. She exhibits no distension. There is no tenderness. There is no guarding.   Musculoskeletal: She exhibits no edema or tenderness.   Lymphadenopathy:     She has no cervical adenopathy.   Neurological: She displays normal reflexes. No cranial nerve deficit.   Skin: No rash noted.   Psychiatric: Her behavior is normal.   Nursing note and vitals reviewed.      Assessment/Plan   Samantha was seen today for hip pain.    Diagnoses and all orders for this visit:    Left hip pain    continue PT

## 2018-03-26 ENCOUNTER — HOSPITAL ENCOUNTER (OUTPATIENT)
Dept: PHYSICAL THERAPY | Facility: HOSPITAL | Age: 63
Setting detail: THERAPIES SERIES
Discharge: HOME | End: 2018-03-26
Attending: INTERNAL MEDICINE

## 2018-03-26 DIAGNOSIS — M19.90 ARTHRITIS: Primary | ICD-10-CM

## 2018-03-26 DIAGNOSIS — Z74.09 IMPAIRED FUNCTIONAL MOBILITY, BALANCE, GAIT, AND ENDURANCE: ICD-10-CM

## 2018-03-26 PROCEDURE — 97110 THERAPEUTIC EXERCISES: CPT | Performed by: PHYSICAL THERAPIST

## 2018-03-26 NOTE — THERAPY TREATMENT NOTE
"    Outpatient Physical Therapy Ortho Treatment Note  [facility]     Patient Name: Samantha Gaines  : 1955  MRN: [id number]  Today's Date: 3/26/2018      Visit Date: 2018    Visit Dx:    ICD-10-CM ICD-9-CM   1. Arthritis M19.90 716.90   2. Impaired functional mobility, balance, gait, and endurance Z74.09 V49.89       Patient Active Problem List   Diagnosis   • Diverticulitis of colon   • Hyperlipidemia   • Vitamin D deficiency   • Incisional hernia        Past Medical History:   Diagnosis Date   • Breast cyst    • Diverticulosis    • Essential hypertriglyceridemia    • PONV (postoperative nausea and vomiting)    • Vitamin D deficiency     \"HAD BLOODWORK RECENTLY AND MY DOCTOR DIDN'T SAY ANYTHING ABOUT THIS AT THAT TIME\"        Past Surgical History:   Procedure Laterality Date   • BREAST BIOPSY     • BREAST CYST EXCISION      PT UNSURE OF LATERALITY   • COLONOSCOPY  UNKNOWN   • DIAGNOSTIC LAPAROSCOPY     • HYSTERECTOMY     • OOPHORECTOMY     • VENTRAL/INCISIONAL HERNIA REPAIR N/A 2016    Procedure: OPEN INCISIONAL HERNIA REPAIR WITH MESH;  Surgeon: Maximino Sparks MD;  Location: [facility];  Service:                              PT Assessment/Plan     Row Name 18 1600          PT Assessment    Assessment Comments Pt required fewer verbal cues and demo from PT in clinic for correct technique. She denies pain during ther exercises; however states knee pain noted with functional sit-stands. Pt verbalzed compliance with current HEP.   -CP        PT Plan    PT Plan Comments Progress to include wall slide squats, forward modified lunge onto BOSU, and possible swimmer for trunk ext.   -CP       User Key  (r) = Recorded By, (t) = Taken By, (c) = Cosigned By    Initials Name Provider Type    CP Corinne E Perkins, PT Physical Therapist                    Exercises     Row Name 18 1600             Subjective Comments    Subjective Comments Pt reports she is doing well, had good f/u with PCP.   " -CP         Subjective Pain    Able to rate subjective pain? yes  -CP      Pre-Treatment Pain Level 0  -CP      Post-Treatment Pain Level 0  -CP         Exercise 1    Exercise Name 1 Nustep Level 6  -CP      Time 1 6  -CP      Additional Comments Total ther ex 39 min  -CP         Exercise 2    Exercise Name 2 3 way hip standing in // bars  -CP      Reps 2 15  -CP         Exercise 3    Exercise Name 3 Standing heel raises off stepper  -CP      Reps 3 15  -CP         Exercise 4    Exercise Name 4 Quadruped UE reaching, wrist neutral position  -CP      Reps 4 10 each  -CP         Exercise 5    Exercise Name 5 Hooklying hip add iso with 2lb medicine ball  -CP      Reps 5 15  -CP         Exercise 6    Exercise Name 6 Quadruped hip abd hydrants  -CP      Reps 6 10  -CP         Exercise 7    Exercise Name 7 Quadruped hip kick backs  -CP      Reps 7 8  -CP         Exercise 9    Exercise Name 9 Side stepping, zig zag step with GTB above knees  -CP      Reps 9 2  -CP      Additional Comments BTB  -CP         Exercise 10    Exercise Name 10 piriformis stretch seated  -CP      Reps 10 2  -CP         Exercise 11    Exercise Name 11 TG squats  -CP      Time 11 1  -CP         Exercise 12    Exercise Name 12 Sit-stands, hip in line with knees  -CP      Reps 12 10   onto airex  -CP         Exercise 13    Exercise Name 13 Lateral hip hike off stepper  -CP      Reps 13 10 each  -CP         Exercise 14    Exercise Name 14 Bridge with narrow CONTRERAS  -CP      Reps 14 15  -CP         Exercise 15    Exercise Name 15 forward step up  -CP      Reps 15 10x each  -CP        User Key  (r) = Recorded By, (t) = Taken By, (c) = Cosigned By    Initials Name Provider Type    CP Corinne E Perkins, PT Physical Therapist                                            Time Calculation:   Start Time: 1630  Total Timed Code Minutes- PT: 39 minute(s)    Therapy Charges for Today     Code Description Service Date Service Provider Modifiers Qty    [id number]  PT  THER PROC EA 15 MIN 3/26/2018 Corinne E Perkins, PT GP 3                    Corinne E. Perkins, PT  3/26/2018

## 2018-03-28 ENCOUNTER — HOSPITAL ENCOUNTER (OUTPATIENT)
Dept: PHYSICAL THERAPY | Facility: HOSPITAL | Age: 63
Setting detail: THERAPIES SERIES
Discharge: HOME | End: 2018-03-28
Attending: INTERNAL MEDICINE

## 2018-03-28 DIAGNOSIS — M19.90 ARTHRITIS: Primary | ICD-10-CM

## 2018-03-28 DIAGNOSIS — Z74.09 IMPAIRED FUNCTIONAL MOBILITY, BALANCE, GAIT, AND ENDURANCE: ICD-10-CM

## 2018-03-28 PROCEDURE — 97110 THERAPEUTIC EXERCISES: CPT | Performed by: PHYSICAL THERAPIST

## 2018-04-02 ENCOUNTER — HOSPITAL ENCOUNTER (OUTPATIENT)
Dept: PHYSICAL THERAPY | Facility: HOSPITAL | Age: 63
Setting detail: THERAPIES SERIES
Discharge: HOME | End: 2018-04-02
Attending: INTERNAL MEDICINE

## 2018-04-02 DIAGNOSIS — M19.90 ARTHRITIS: Primary | ICD-10-CM

## 2018-04-02 DIAGNOSIS — Z74.09 IMPAIRED FUNCTIONAL MOBILITY, BALANCE, GAIT, AND ENDURANCE: ICD-10-CM

## 2018-04-02 PROCEDURE — 97110 THERAPEUTIC EXERCISES: CPT | Performed by: PHYSICAL THERAPIST

## 2018-04-02 NOTE — THERAPY TREATMENT NOTE
"    Outpatient Physical Therapy Ortho Treatment Note  [facility]     Patient Name: Samantha Gaines  : 1955  MRN: [id number]  Today's Date: 2018      Visit Date: 2018    Visit Dx:    ICD-10-CM ICD-9-CM   1. Arthritis M19.90 716.90   2. Impaired functional mobility, balance, gait, and endurance Z74.09 V49.89       Patient Active Problem List   Diagnosis   • Diverticulitis of colon   • Hyperlipidemia   • Vitamin D deficiency   • Incisional hernia        Past Medical History:   Diagnosis Date   • Breast cyst    • Diverticulosis    • Essential hypertriglyceridemia    • PONV (postoperative nausea and vomiting)    • Vitamin D deficiency     \"HAD BLOODWORK RECENTLY AND MY DOCTOR DIDN'T SAY ANYTHING ABOUT THIS AT THAT TIME\"        Past Surgical History:   Procedure Laterality Date   • BREAST BIOPSY     • BREAST CYST EXCISION      PT UNSURE OF LATERALITY   • COLONOSCOPY  UNKNOWN   • DIAGNOSTIC LAPAROSCOPY     • HYSTERECTOMY     • OOPHORECTOMY     • VENTRAL/INCISIONAL HERNIA REPAIR N/A 2016    Procedure: OPEN INCISIONAL HERNIA REPAIR WITH MESH;  Surgeon: Maximino Sparks MD;  Location: [facility];  Service:                              PT Assessment/Plan     Row Name 18 1000          PT Assessment    Assessment Comments Pt continues to tolerate progression of ther exercises and additions to HEP. Pt given BTB for home for increased resistance.   -CP        PT Plan    PT Plan Comments Reassessment next visit. Assess response from progressed ther ex.   -CP       User Key  (r) = Recorded By, (t) = Taken By, (c) = Cosigned By    Initials Name Provider Type    CP Corinne E Perkins, PT Physical Therapist                    Exercises     Row Name 18 1000             Subjective Comments    Subjective Comments Pt states no new change of pain.   -CP         Subjective Pain    Able to rate subjective pain? yes  -CP      Pre-Treatment Pain Level 0  -CP      Post-Treatment Pain Level 0  -CP         " Exercise 1    Exercise Name 1 Nustep Level 7  -CP      Time 1 5  -CP         Exercise 2    Exercise Name 2 Wall slide squats  -CP      Cueing 2 Verbal  -CP      Reps 2 15  -CP      Additional Comments using red therapy ball  -CP         Exercise 3    Exercise Name 3 Forward BOSU lunges  -CP      Cueing 3 Verbal;Demo  -CP      Reps 3 12  -CP      Additional Comments BUE for support  -CP         Exercise 4    Exercise Name 4 swimmer prone  -CP      Cueing 4 Verbal;Tactile  -CP      Reps 4 10  -CP         Exercise 5    Exercise Name 5 Prone glute sets/heel squeezes  -CP      Reps 5 12  -CP         Exercise 6    Exercise Name 6 Quadruped hip abd hydrants  -CP      Cueing 6 Verbal  -CP      Reps 6 12  -CP      Additional Comments 1lb ankle weights  -CP         Exercise 7    Exercise Name 7 Quadruped hip kick backs  -CP      Cueing 7 Verbal  -CP      Reps 7 12  -CP      Additional Comments 1lb ankle weights  -CP         Exercise 8    Exercise Name 8 UE standing matrix for core activation, standing in mod tandem stance   -CP      Cueing 8 Verbal;Demo  -CP      Reps 8 5   5x extension, 5x R/L rotation each with leg forward  -CP         Exercise 9    Exercise Name 9 Side stepping, zig zag step, backward with BTB above knees  -CP      Reps 9 2  -CP      Additional Comments btb  -CP         Exercise 10    Exercise Name 10 piriformis stretch seated  -CP      Reps 10 2  -CP         Exercise 12    Exercise Name 12 Standing mini squats on BOSU  -CP      Reps 12 10  -CP         Exercise 13    Exercise Name 13 Sit-stands with BTB above knees  -CP      Cueing 13 Verbal  -CP      Reps 13 10  -CP      Additional Comments BTB  -CP         Exercise 14    Exercise Name 14 Bridge with narrow CONTRERAS  -CP      Reps 14 15  -CP        User Key  (r) = Recorded By, (t) = Taken By, (c) = Cosigned By    Initials Name Provider Type    CP Corinne E Perkins, PT Physical Therapist                             Therapy Education  Education Details: HEP  progressed to include UE matrix reaching for core into extension and rotation, along with prone swimmers for trunk extension. BTB for home.   Given: HEP  Program: Progressed  How Provided: Verbal, Demonstration, Written  Provided to: Patient  Level of Understanding: Verbalized, Demonstrated              Time Calculation:   Start Time: 1030  Total Timed Code Minutes- PT: 40 minute(s)    Therapy Charges for Today     Code Description Service Date Service Provider Modifiers Qty    27458143759 HC PT THER PROC EA 15 MIN 4/2/2018 Corinne E Perkins, PT GP 3                    Corinne E. Perkins, PT  4/2/2018

## 2018-04-04 ENCOUNTER — HOSPITAL ENCOUNTER (OUTPATIENT)
Dept: PHYSICAL THERAPY | Facility: HOSPITAL | Age: 63
Setting detail: THERAPIES SERIES
Discharge: HOME | End: 2018-04-04
Attending: INTERNAL MEDICINE

## 2018-04-04 DIAGNOSIS — Z74.09 IMPAIRED FUNCTIONAL MOBILITY, BALANCE, GAIT, AND ENDURANCE: ICD-10-CM

## 2018-04-04 DIAGNOSIS — M19.90 ARTHRITIS: Primary | ICD-10-CM

## 2018-04-04 PROCEDURE — 97110 THERAPEUTIC EXERCISES: CPT | Performed by: PHYSICAL THERAPIST

## 2018-04-04 NOTE — THERAPY PROGRESS REPORT/RE-CERT
"    Outpatient Physical Therapy Ortho Re-Assessment  [facility]     Patient Name: Samantha Gaines  : 1955  MRN: [id number]  Today's Date: 2018      Visit Date: 2018    Patient Active Problem List   Diagnosis   • Diverticulitis of colon   • Hyperlipidemia   • Vitamin D deficiency   • Incisional hernia        Past Medical History:   Diagnosis Date   • Breast cyst    • Diverticulosis    • Essential hypertriglyceridemia    • PONV (postoperative nausea and vomiting)    • Vitamin D deficiency     \"HAD BLOODWORK RECENTLY AND MY DOCTOR DIDN'T SAY ANYTHING ABOUT THIS AT THAT TIME\"        Past Surgical History:   Procedure Laterality Date   • BREAST BIOPSY     • BREAST CYST EXCISION      PT UNSURE OF LATERALITY   • COLONOSCOPY  UNKNOWN   • DIAGNOSTIC LAPAROSCOPY     • HYSTERECTOMY     • OOPHORECTOMY     • VENTRAL/INCISIONAL HERNIA REPAIR N/A 2016    Procedure: OPEN INCISIONAL HERNIA REPAIR WITH MESH;  Surgeon: Maximino Sparks MD;  Location: [facility];  Service:        Visit Dx:     ICD-10-CM ICD-9-CM   1. Arthritis M19.90 716.90   2. Impaired functional mobility, balance, gait, and endurance Z74.09 V49.89                 PT Ortho     Row Name 18 1000       Lumbosacral Palpation    Lumbosacral Segment --   denies tenderness w/ palpation  -CP    Piriformis Left:;Guarded/taut  -CP       Lumbar/SI Special Tests    Slump Test (Neural Tension) Negative;Bilateral:  -CP    Neva Poncho Test (HNP) Bilateral:;Negative  -CP    SLR (Neural Tension) Bilateral:;Negative  -CP    TYRELL (hip vs. SI Dysfunction) Left:;Right:;Negative   limited AROM LLE, no pain  -CP    S[place]ral [place] Test (SI Dysfunction) Negative  -CP       General ROM    Head/Neck/Trunk Trunk Extension;Trunk Flexion;Trunk Lt Lateral Flexion;Trunk Rt Lateral Flexion;Trunk Lt Rotation;Trunk Rt Rotation  -CP       Head/Neck/Trunk    Trunk Extension AROM 26  -CP    Trunk Flexion AROM 57  -CP    Trunk Lt Lateral Flexion AROM 33  -CP    Trunk Rt " Lateral Flexion AROM 33  -CP    Trunk Lt Rotation AROM WFL  -CP    Trunk Rt Rotation AROM WFL  -CP       MMT (Manual Muscle Testing)    Additional Documentation General Assessment (Manual Muscle Testing) (Group)  -CP       General Assessment (Manual Muscle Testing)    General Manual Muscle Testing (MMT) Assessment lower extremity strength deficits identified  -CP       Lower Extremity (Manual Muscle Testing)    Lower Extremity: Manual Muscle Testing (MMT) left hip strength deficit;right hip strength deficit;left knee strength deficit;right knee strength deficit;left ankle strength deficit;right ankle strength deficit  -CP       Left Hip (Manual Muscle Testing)    Left Hip Manual Muscle Testing (MMT) flexion;abduction;adduction  -CP    MMT: Flexion, Left Hip flexion  -CP    MMT, Gross Movement: Left Hip Flexion (4/5) good  -CP    MMT: ABduction, Left Hip abduction  -CP    MMT, Gross Movement: Left Hip ABduction --   4+  -CP    MMT, Gross Movement: Left Hip ADduction (4/5) good  -CP       Right Hip (Manual Muscle Testing)    Right Hip Manual Muscle Testing (MMT) flexion;abduction;adduction  -CP    MMT: Flexion, Right Hip flexion  -CP    MMT, Gross Movement: Right Hip Flexion --   4+  -CP    MMT: ABduction, Right Hip abduction  -CP    MMT, Gross Movement: Right Hip ABduction --   4+  -CP    MMT, Gross Movement: Right Hip ADduction (4/5) good  -CP       Left Knee (Manual Muscle Testing)    Left Knee Manual Muscle Testing (MMT) extension;flexion  -CP    MMT: Extension, Left Knee extension  -CP    MMT, Gross Movement: Left Knee Extension (4/5) good  -CP    MMT: Flexion, Left Knee flexion  -CP    MMT, Gross Movement: Left Knee Flexion --   4+  -CP       Right Knee (Manual Muscle Testing)    Right Knee Manual Muscle Testing (MMT) extension;flexion  -CP    MMT: Extension, Right Knee extension  -CP    MMT, Gross Movement: Right Knee Extension --   4+  -CP    MMT: Flexion, Right Knee flexion  -CP    MMT, Gross Movement: Right  Knee Flexion --   4+  -CP       Left Ankle/Foot (Manual Muscle Testing)    Left Ankle Manual Muscle Testing (MMT) plantarflexion;dorsiflexion  -CP    MMT: Dorsiflexion Left Ankle Muscles dorsiflexion  -CP    MMT, Gross Movement: Left Ankle Dorsiflexion (5/5) normal  -CP       Right Ankle/Foot (Manual Muscle Testing)    Right Ankle Manual Muscle Testing (MMT) plantarflexion;dorsiflexion  -CP    MMT: Dorsiflexion, Right Ankle Muscles dorsiflexion  -CP    MMT, Gross Movement: Right Ankle Dorsiflexion (5/5) normal  -CP       Sensation    Light Touch No apparent deficits  -CP       Flexibility    Flexibility Tested? Lower Extremity  -CP       Lower Extremity Flexibility    Hamstrings WNL;Bilateral:  -CP    Hip External Rotators Bilateral:;WNL  -CP    Gastrocnemius WNL  -CP       Transfers    Comment (Transfers) Pt denies pain in low back during functional sit-stand. Pt able to perform sit-stand from low height without use of BUE.   -CP       Gait/Stairs Assessment/Training    Gait/Stairs Assessment/Training gait/ambulation independence  -CP    Hammon Level (Gait) independent  -CP    Negotiation (Stairs) stairs independence  -CP    Hammon Level (Stairs) independent  -CP    Number of Steps (Stairs) 13  -CP    Ascending Technique (Stairs) step-over-step  -CP    Descending Technique (Stairs) step-over-step  -CP    Comment (Gait/Stairs) Pt ambulated with step through gait pattern, equal step lengths and arm swing in clinic. Trendelenburg gait functionally persists, mild.   -CP      User Key  (r) = Recorded By, (t) = Taken By, (c) = Cosigned By    Initials Name Provider Type    CP Corinne E Perkins, PT Physical Therapist                      Therapy Education  Given: HEP  Program: Reinforced  How Provided: Verbal  Provided to: Patient  Level of Understanding: Verbalized, Demonstrated           PT OP Goals     Row Name 04/04/18 1000          PT Short Term Goals    STG Date to Achieve 04/18/18  -CP     STG 1 Patient  subjectively report that altered left leg symptoms have decreased by 50% with frequency or intensity.  -CP     STG 1 Progress Met  -CP     STG 2 Patient is independent with HEP for flexibility and strengthening.  -CP     STG 2 Progress Met  -CP        Long Term Goals    LTG Date to Achieve 05/04/18  -CP     LTG 1 Patient will demonstrate lumbar AROM WNL in all planes to improve ability to perform daily functional activities.  -CP     LTG 1 Progress Partially Met;Ongoing  -CP     LTG 2 Modified Oswestry score is improved by at least 10%.  -CP     LTG 2 Progress Ongoing  -CP     LTG 2 Progress Comments Improved by 2% today from 26% to 24%.   -CP     LTG 3 Patient is independent with long term HEP for improved postural awareness and stabilization.  -CP     LTG 3 Progress Ongoing  -CP     LTG 4 Patient is able to tolerate at least 30 min of standing or walking to improved tolerance to ADLs.  -CP     LTG 4 Progress Ongoing;Progressing  -CP        Time Calculation    PT Goal Re-Cert Due Date 06/05/18  -CP       User Key  (r) = Recorded By, (t) = Taken By, (c) = Cosigned By    Initials Name Provider Type    CP Corinne E Perkins, PT Physical Therapist                PT Assessment/Plan     Row Name 04/04/18 1000          PT Assessment    Functional Limitations Decreased safety during functional activities;Impaired gait;Limitation in home management;Limitations in community activities;Performance in leisure activities;Performance in work activities  -CP     Impairments Endurance;Gait;Range of motion;Posture;Poor body mechanics;Pain;Muscle strength;Joint mobility  -CP     Assessment Comments Pt is making steady progress towards her functional goals with improved activity tolerance, pain mgmt, and improved BLE strength. She is compliant with current HEP and denies radicular symptoms in LLE.   -CP     Please refer to paper survey for additional self-reported information Yes  -CP     Rehab Potential Good  -CP      Patient/caregiver participated in establishment of treatment plan and goals Yes  -CP     Patient would benefit from skilled therapy intervention Yes  -CP        PT Plan    PT Frequency 2x/week;1x/week  -CP     Planned CPT's? PT EVAL LOW COMPLEXITY: 64432;PT RE-EVAL: 90841;PT THER PROC EA 15 MIN: 90434;PT MANUAL THERAPY EA 15 MIN: 09648;PT NEUROMUSC RE-EDUCATION EA 15 MIN: 81177;PT ELECTRICAL STIM UNATTEND: ;PT ULTRASOUND EA 15 MIN: 96148;PT TRACTION LUMBAR: 03001;PT HOT/COLD PACK WC NONMCARE: 63211  -CP     PT Plan Comments Recommend continued skilled PT to progress functional strengthening ther exercises for improved spinal stabilization and decrease pain as tolerated. Will continued 2x/week for the next few weeks, then could consider decrease freq to 1x/week when appropriate to conserve visits.   -CP        Clinical Impression    Predicted Duration of Therapy Intervention (days/wks) 6-8 visits  -CP       User Key  (r) = Recorded By, (t) = Taken By, (c) = Cosigned By    Initials Name Provider Type    CP Corinne E Perkins, PT Physical Therapist                  Exercises     Row Name 04/04/18 1000             Subjective Comments    Subjective Comments Pt reports she can tell she is making progress, denies pain on arrival but states prolonged activity will reproduce mild pain. She denies symptoms in LLE in the past week.   -CP         Subjective Pain    Able to rate subjective pain? yes  -CP      Pre-Treatment Pain Level 0  -CP      Post-Treatment Pain Level 0  -CP         Exercise 1    Exercise Name 1 Reassessment completed this date in clinic.   -CP        User Key  (r) = Recorded By, (t) = Taken By, (c) = Cosigned By    Initials Name Provider Type    CP Corinne E Perkins, PT Physical Therapist                        Outcome Measure Options: Addison Kitchen  Modified Oswestry  Modified Oswestry Score/Comments: 24%      Time Calculation:   Start Time: 1030  Total Timed Code Minutes- PT: 38 minute(s)     Therapy  Charges for Today     Code Description Service Date Service Provider Modifiers Qty    27887644541 HC PT THER PROC EA 15 MIN 4/4/2018 Corinne E Perkins, PT GP 3          PT G-Codes  Outcome Measure Options: Modifed Owestry Corinne E. Perkins, PT  4/4/2018

## 2018-04-09 ENCOUNTER — HOSPITAL ENCOUNTER (OUTPATIENT)
Dept: PHYSICAL THERAPY | Facility: HOSPITAL | Age: 63
Setting detail: THERAPIES SERIES
Discharge: HOME | End: 2018-04-09
Attending: INTERNAL MEDICINE

## 2018-04-09 DIAGNOSIS — Z74.09 IMPAIRED FUNCTIONAL MOBILITY, BALANCE, GAIT, AND ENDURANCE: ICD-10-CM

## 2018-04-09 DIAGNOSIS — M19.90 ARTHRITIS: Primary | ICD-10-CM

## 2018-04-09 PROCEDURE — 97110 THERAPEUTIC EXERCISES: CPT | Performed by: PHYSICAL THERAPIST

## 2018-04-09 NOTE — THERAPY TREATMENT NOTE
"    Outpatient Physical Therapy Ortho Treatment Note  [facility]     Patient Name: Samantha Gaines  : 1955  MRN: [id number]  Today's Date: 2018      Visit Date: 2018    Visit Dx:    ICD-10-CM ICD-9-CM   1. Arthritis M19.90 716.90   2. Impaired functional mobility, balance, gait, and endurance Z74.09 V49.89       Patient Active Problem List   Diagnosis   • Diverticulitis of colon   • Hyperlipidemia   • Vitamin D deficiency   • Incisional hernia        Past Medical History:   Diagnosis Date   • Breast cyst    • Diverticulosis    • Essential hypertriglyceridemia    • PONV (postoperative nausea and vomiting)    • Vitamin D deficiency     \"HAD BLOODWORK RECENTLY AND MY DOCTOR DIDN'T SAY ANYTHING ABOUT THIS AT THAT TIME\"        Past Surgical History:   Procedure Laterality Date   • BREAST BIOPSY     • BREAST CYST EXCISION      PT UNSURE OF LATERALITY   • COLONOSCOPY  UNKNOWN   • DIAGNOSTIC LAPAROSCOPY     • HYSTERECTOMY     • OOPHORECTOMY     • VENTRAL/INCISIONAL HERNIA REPAIR N/A 2016    Procedure: OPEN INCISIONAL HERNIA REPAIR WITH MESH;  Surgeon: Maximino Sparks MD;  Location: [facility];  Service:                              PT Assessment/Plan     Row Name 18 1700          PT Assessment    Assessment Comments Pt required verbal cueing and demo from PT for correct technique with new, addition ther ex for spinal stabilization and LE strengthening. Pt reported mild L knee pain w/ lateral lunge, improved with practice. She is compliant with HEP.   -CP        PT Plan    PT Plan Comments Progress HEP next visit as tolerated to include lunges. Pt will only be seen 1x this week d/t conflicted schedule.   -CP       User Key  (r) = Recorded By, (t) = Taken By, (c) = Cosigned By    Initials Name Provider Type    CP Corinne E Perkins, PT Physical Therapist                    Exercises     Row Name 18 1708             Subjective Comments    Subjective Comments Pt denies pain on arrival, " states she was able to shop last week without increased pain in leg, hip, or back.   -CP         Subjective Pain    Able to rate subjective pain? yes  -CP      Pre-Treatment Pain Level 0  -CP      Post-Treatment Pain Level 0  -CP         Exercise 1    Exercise Name 1 Nustep level 7  -CP      Time 1 5  -CP      Additional Comments Total ther ex time: 39 minutes  -CP         Exercise 2    Exercise Name 2 Wall slide squats  -CP      Reps 2 15  -CP      Additional Comments red therapy ball  -CP         Exercise 3    Exercise Name 3 Forward BOSU lunges  -CP      Cueing 3 Verbal  -CP      Reps 3 12  -CP         Exercise 4    Exercise Name 4 Tall kneeling into half kneeling   -CP      Cueing 4 Verbal;Demo  -CP      Reps 4 5  -CP      Additional Comments hand support  -CP         Exercise 5    Exercise Name 5 hamstring stretch  -CP      Reps 5 3  -CP         Exercise 6    Exercise Name 6 Quadruped hip abd hydrants  -CP      Reps 6 12  -CP         Exercise 7    Exercise Name 7 Quadruped hip kick backs  -CP      Reps 7 12  -CP         Exercise 8    Exercise Name 8 UE standing matrix for core activation, standing in mod tandem stance   -CP      Reps 8 8x each side; holding 2lb medicine ball for rotation; tandem stance  -CP      Additional Comments add lift with lateral lunge, no weight 5x each side  -CP         Exercise 9    Exercise Name 9 Side stepping, zig zag step, backward with BTB above knees  -CP      Reps 9 2  -CP      Additional Comments BTB with 7.5 lb kettlebell  -CP         Exercise 10    Exercise Name 10 piriformis stretch seated  -CP      Reps 10 2  -CP         Exercise 11    Exercise Name 11 bird dogs  -CP      Reps 11 8x each   -CP         Exercise 12    Exercise Name 12 Standing mini squats on BOSU  -CP      Reps 12 20  -CP         Exercise 13    Exercise Name 13 Sit-stands with BTB above knees  -CP      Reps 13 12  -CP      Additional Comments BTB  -CP        User Key  (r) = Recorded By, (t) = Taken By, (c) =  Cosigned By    Initials Name Provider Type    CP Corinne E Perkins, PT Physical Therapist                                            Time Calculation:   Start Time: 1720  Total Timed Code Minutes- PT: 39 minute(s)    Therapy Charges for Today     Code Description Service Date Service Provider Modifiers Qty    25071448690 HC PT THER PROC EA 15 MIN 4/9/2018 Corinne E Perkins, PT GP 3                    Corinne E. Perkins, PT  4/9/2018

## 2018-04-11 ENCOUNTER — APPOINTMENT (OUTPATIENT)
Dept: PHYSICAL THERAPY | Facility: HOSPITAL | Age: 63
End: 2018-04-11
Attending: INTERNAL MEDICINE

## 2018-04-16 ENCOUNTER — HOSPITAL ENCOUNTER (OUTPATIENT)
Dept: PHYSICAL THERAPY | Facility: HOSPITAL | Age: 63
Setting detail: THERAPIES SERIES
Discharge: HOME | End: 2018-04-16
Attending: INTERNAL MEDICINE

## 2018-04-16 DIAGNOSIS — Z74.09 IMPAIRED FUNCTIONAL MOBILITY, BALANCE, GAIT, AND ENDURANCE: ICD-10-CM

## 2018-04-16 DIAGNOSIS — M19.90 ARTHRITIS: Primary | ICD-10-CM

## 2018-04-16 PROCEDURE — 97110 THERAPEUTIC EXERCISES: CPT | Performed by: PHYSICAL THERAPIST

## 2018-04-16 NOTE — THERAPY TREATMENT NOTE
"    Outpatient Physical Therapy Ortho Treatment Note  [facility]     Patient Name: Samantha Gaines  : 1955  MRN: [id number]  Today's Date: 2018      Visit Date: 2018    Visit Dx:    ICD-10-CM ICD-9-CM   1. Arthritis M19.90 716.90   2. Impaired functional mobility, balance, gait, and endurance Z74.09 V49.89       Patient Active Problem List   Diagnosis   • Diverticulitis of colon   • Hyperlipidemia   • Vitamin D deficiency   • Incisional hernia        Past Medical History:   Diagnosis Date   • Breast cyst    • Diverticulosis    • Essential hypertriglyceridemia    • PONV (postoperative nausea and vomiting)    • Vitamin D deficiency     \"HAD BLOODWORK RECENTLY AND MY DOCTOR DIDN'T SAY ANYTHING ABOUT THIS AT THAT TIME\"        Past Surgical History:   Procedure Laterality Date   • BREAST BIOPSY     • BREAST CYST EXCISION      PT UNSURE OF LATERALITY   • COLONOSCOPY  UNKNOWN   • DIAGNOSTIC LAPAROSCOPY     • HYSTERECTOMY     • OOPHORECTOMY     • VENTRAL/INCISIONAL HERNIA REPAIR N/A 2016    Procedure: OPEN INCISIONAL HERNIA REPAIR WITH MESH;  Surgeon: Maximino Sparks MD;  Location: [facility];  Service:                              PT Assessment/Plan     Row Name 18 1700          PT Assessment    Assessment Comments Pt denies pain in clinic, verbalized continued compliance with HEP. She demonstrated improved dynamic balance with bird dogs and multi directional lunges. She would continue to benefit from PT for functional strengthening.   -CP        PT Plan    PT Plan Comments Assess lifting technique and weighted squat to improve work ergonomics. Could trial elliptical next visit.   -CP       User Key  (r) = Recorded By, (t) = Taken By, (c) = Cosigned By    Initials Name Provider Type    CP Corinne E Perkins, PT Physical Therapist                    Exercises     Row Name 18 1700             Subjective Comments    Subjective Comments Pt states \"[birth year] my birthday!\" She reports no pain " on arrival.   -CP         Subjective Pain    Able to rate subjective pain? yes  -CP      Pre-Treatment Pain Level 0  -CP      Post-Treatment Pain Level 0  -CP         Exercise 1    Exercise Name 1 Nustep level 7  -CP      Time 1 5  -CP      Additional Comments Total ther ex: 38 min  -CP         Exercise 2    Exercise Name 2 Wall slide squats  -CP      Reps 2 15  -CP      Additional Comments red therapy ball  -CP         Exercise 3    Exercise Name 3 Forward BOSU lunges  -CP      Reps 3 15  -CP         Exercise 4    Exercise Name 4 --  -CP         Exercise 5    Exercise Name 5 hamstring stretch  -CP      Reps 5 3  -CP         Exercise 6    Exercise Name 6 Quadruped hip abd hydrants  -CP      Reps 6 15  -CP         Exercise 7    Exercise Name 7 Swimmers prone  -CP      Reps 7 12x each  -CP         Exercise 8    Exercise Name 8 UE and LE standing matrix for core activation, foward and lateral lunges  -CP      Reps 8 8x each  -CP      Additional Comments 2lb medicine ball, lunges with each  -CP         Exercise 9    Exercise Name 9 Side stepping, zig zag step, backward with BTB above knees  -CP      Reps 9 2  -CP      Additional Comments BTB, with 10lb kettlebell  -CP         Exercise 10    Exercise Name 10 piriformis stretch seated  -CP      Reps 10 2  -CP         Exercise 11    Exercise Name 11 bird dogs  -CP      Reps 11 10x each  -CP         Exercise 12    Exercise Name 12 Standing mini squats on BOSU  -CP      Reps 12 15  -CP         Exercise 13    Exercise Name 13 Sit-stands with BTB above knees; standing on airex  -CP      Sets 13 2  -CP      Reps 13 10  -CP      Additional Comments BTB, holding 10lb kettlebell  -CP        User Key  (r) = Recorded By, (t) = Taken By, (c) = Cosigned By    Initials Name Provider Type    CP Corinne E Perkins, PT Physical Therapist                                            Time Calculation:   Start Time: 1722  Total Timed Code Minutes- PT: 38 minute(s)    Therapy Charges for Today      Code Description Service Date Service Provider Modifiers Qty    99960528519 HC PT THER PROC EA 15 MIN 4/16/2018 Corinne E Perkins, PT GP 3                    Corinne E. Perkins, PT  4/16/2018

## 2018-04-18 ENCOUNTER — HOSPITAL ENCOUNTER (OUTPATIENT)
Dept: PHYSICAL THERAPY | Facility: HOSPITAL | Age: 63
Setting detail: THERAPIES SERIES
Discharge: HOME | End: 2018-04-18
Attending: INTERNAL MEDICINE

## 2018-04-18 DIAGNOSIS — M19.90 ARTHRITIS: Primary | ICD-10-CM

## 2018-04-18 DIAGNOSIS — Z74.09 IMPAIRED FUNCTIONAL MOBILITY, BALANCE, GAIT, AND ENDURANCE: ICD-10-CM

## 2018-04-18 PROCEDURE — 97110 THERAPEUTIC EXERCISES: CPT | Performed by: PHYSICAL THERAPIST

## 2018-04-18 NOTE — THERAPY TREATMENT NOTE
"    Outpatient Physical Therapy Ortho Treatment Note  [facility]     Patient Name: Samantha Gaines  : 1955  MRN: [id number]  Today's Date: 2018      Visit Date: 2018    Visit Dx:    ICD-10-CM ICD-9-CM   1. Arthritis M19.90 716.90   2. Impaired functional mobility, balance, gait, and endurance Z74.09 V49.89       Patient Active Problem List   Diagnosis   • Diverticulitis of colon   • Hyperlipidemia   • Vitamin D deficiency   • Incisional hernia        Past Medical History:   Diagnosis Date   • Breast cyst    • Diverticulosis    • Essential hypertriglyceridemia    • PONV (postoperative nausea and vomiting)    • Vitamin D deficiency     \"HAD BLOODWORK RECENTLY AND MY DOCTOR DIDN'T SAY ANYTHING ABOUT THIS AT THAT TIME\"        Past Surgical History:   Procedure Laterality Date   • BREAST BIOPSY     • BREAST CYST EXCISION      PT UNSURE OF LATERALITY   • COLONOSCOPY  UNKNOWN   • DIAGNOSTIC LAPAROSCOPY     • HYSTERECTOMY     • OOPHORECTOMY     • VENTRAL/INCISIONAL HERNIA REPAIR N/A 2016    Procedure: OPEN INCISIONAL HERNIA REPAIR WITH MESH;  Surgeon: Maximino Sparks MD;  Location: [facility];  Service:                              PT Assessment/Plan     Row Name 18 1700          PT Assessment    Assessment Comments Increased verbal cueing and demo required for improved lifting technique to avoid lumbar strain. Pt tolerated progression of standing hamstring and glute ther ex.   -CP        PT Plan    PT Plan Comments Progress standing glute strengthening ther exercises with spinal stab as tolerated.   -CP       User Key  (r) = Recorded By, (t) = Taken By, (c) = Cosigned By    Initials Name Provider Type    CP Corinne E Perkins, PT Physical Therapist                    Exercises     Row Name 18 1700             Subjective Comments    Subjective Comments Pt states she was sore after last session in knees but states it didn't last long. She denies any LBP or leg symptoms on arrival.   -CP  "        Subjective Pain    Able to rate subjective pain? yes  -CP      Pre-Treatment Pain Level 0  -CP      Post-Treatment Pain Level 0  -CP         Exercise 1    Exercise Name 1 Elliptical Level 1  -CP      Time 1 4  -CP      Additional Comments Total ther ex: 38 min  -CP         Exercise 2    Exercise Name 2 Standing squats, lifting 11-15lb box to mimic work tasks  -CP      Sets 2 2  -CP      Reps 2 5  -CP         Exercise 3    Exercise Name 3 Forward BOSU lunges  -CP      Reps 3 12  -CP      Additional Comments no UE support in open clinic  -CP         Exercise 4    Exercise Name 4 Deadlift with 5lb dowel  -CP      Cueing 4 Verbal;Demo  -CP      Reps 4 10x  -CP         Exercise 5    Exercise Name 5 hamstring stretch  -CP      Reps 5 3  -CP         Exercise 9    Exercise Name 9 Side stepping, zig zag step, backward with BTB above knees  -CP      Reps 9 2  -CP         Exercise 10    Exercise Name 10 piriformis stretch seated  -CP      Reps 10 2  -CP         Exercise 11    Exercise Name 11 bird dogs  -CP      Reps 11 10x each  -CP         Exercise 12    Exercise Name 12 Standing mini squats on BOSU  -CP      Reps 12 15  -CP         Exercise 13    Exercise Name 13 Sit-stands with BTB above knees; standing on airex  -CP      Sets 13 2  -CP      Reps 13 12  -CP      Additional Comments BTB, holding 10lb kettlebell  -CP        User Key  (r) = Recorded By, (t) = Taken By, (c) = Cosigned By    Initials Name Provider Type    CP Corinne E Perkins, PT Physical Therapist                                            Time Calculation:   Start Time: 1715  Total Timed Code Minutes- PT: 38 minute(s)    Therapy Charges for Today     Code Description Service Date Service Provider Modifiers Qty    [id number]  PT THER PROC EA 15 MIN 4/18/2018 Corinne E Perkins, PT GP 3                    Corinne E. Perkins, PT  4/18/2018

## 2018-04-23 ENCOUNTER — HOSPITAL ENCOUNTER (OUTPATIENT)
Dept: PHYSICAL THERAPY | Facility: HOSPITAL | Age: 63
Setting detail: THERAPIES SERIES
Discharge: HOME | End: 2018-04-23
Attending: INTERNAL MEDICINE

## 2018-04-23 DIAGNOSIS — M19.90 ARTHRITIS: Primary | ICD-10-CM

## 2018-04-23 DIAGNOSIS — Z74.09 IMPAIRED FUNCTIONAL MOBILITY, BALANCE, GAIT, AND ENDURANCE: ICD-10-CM

## 2018-04-23 PROCEDURE — 97110 THERAPEUTIC EXERCISES: CPT | Performed by: PHYSICAL THERAPIST

## 2018-04-24 NOTE — THERAPY TREATMENT NOTE
"    Outpatient Physical Therapy Ortho Treatment Note  [facility]     Patient Name: Samantha Gaines  : 1955  MRN: [id number]  Today's Date: 2018      Visit Date: 2018    Visit Dx:    ICD-10-CM ICD-9-CM   1. Arthritis M19.90 716.90   2. Impaired functional mobility, balance, gait, and endurance Z74.09 V49.89       Patient Active Problem List   Diagnosis   • Diverticulitis of colon   • Hyperlipidemia   • Vitamin D deficiency   • Incisional hernia        Past Medical History:   Diagnosis Date   • Breast cyst    • Diverticulosis    • Essential hypertriglyceridemia    • PONV (postoperative nausea and vomiting)    • Vitamin D deficiency     \"HAD BLOODWORK RECENTLY AND MY DOCTOR DIDN'T SAY ANYTHING ABOUT THIS AT THAT TIME\"        Past Surgical History:   Procedure Laterality Date   • BREAST BIOPSY     • BREAST CYST EXCISION      PT UNSURE OF LATERALITY   • COLONOSCOPY  UNKNOWN   • DIAGNOSTIC LAPAROSCOPY     • HYSTERECTOMY     • OOPHORECTOMY     • VENTRAL/INCISIONAL HERNIA REPAIR N/A 2016    Procedure: OPEN INCISIONAL HERNIA REPAIR WITH MESH;  Surgeon: Maximino Sparks MD;  Location: [facility];  Service:                              PT Assessment/Plan     Row Name 18 1709          PT Assessment    Assessment Comments Pt given BTB for home use after demonstrating improved activity tolerance. Held progression of standing ther ex d/t noted LLE radicular symptoms and soreness. Improved with prone positioning and stretching.   -CP        PT Plan    PT Plan Comments Anticipate decreased freq to 1x/week next week. Will review HEP next visit.   -CP       User Key  (r) = Recorded By, (t) = Taken By, (c) = Cosigned By    Initials Name Provider Type    CP Corinne E Perkins, PT Physical Therapist                    Exercises     Row Name 18 1702             Subjective Comments    Subjective Comments Pt states she had increased symptoms in L leg over the weekend, states she may have over did it with " yard work and housework.   -CP         Subjective Pain    Able to rate subjective pain? yes  -CP      Pre-Treatment Pain Level 0  -CP      Post-Treatment Pain Level 0  -CP         Exercise 1    Exercise Name 1 Elliptical Level 1  -CP      Time 1 4  -CP      Additional Comments Total ther ex including pt education on activity modification: 39 minutes  -CP         Exercise 2    Exercise Name 2 Prone hip ext with flex knee  -CP      Cueing 2 Verbal  -CP      Reps 2 12x each  -CP         Exercise 3    Exercise Name 3 Forward BOSU lunges  -CP      Reps 3 12x each  -CP         Exercise 4    Exercise Name 4 LTR  -CP      Reps 4 10x each with red therapy ball  -CP         Exercise 5    Exercise Name 5 hamstring stretch  -CP      Reps 5 3  -CP         Exercise 6    Exercise Name 6 SL bridge with SLR  -CP      Cueing 6 Verbal  -CP      Reps 6 10x each, slow/controlled  -CP         Exercise 7    Exercise Name 7 Swimmers prone  -CP      Reps 7 12x each  -CP         Exercise 8    Exercise Name 8 UE and LE standing matrix for core activation, tandem stance holds (held lunges today)  -CP      Cueing 8 Verbal  -CP      Reps 8 10x each  -CP         Exercise 9    Exercise Name 9 Side stepping, zig zag step, backward with BTB above knees  -CP      Reps 9 4 x 30ft  -CP      Additional Comments BTB with 10 lb KB  -CP         Exercise 10    Exercise Name 10 piriformis stretch seated  -CP      Reps 10 2  -CP         Exercise 13    Exercise Name 13 Sit-stands with BTB above knees; standing on airex  -CP      Sets 13 2  -CP      Reps 13 12  -CP      Additional Comments BTB holding 10 lb KB  -CP        User Key  (r) = Recorded By, (t) = Taken By, (c) = Cosigned By    Initials Name Provider Type    CP Corinne E Perkins, PT Physical Therapist                                            Time Calculation:   Start Time: 1722  Total Timed Code Minutes- PT: 39 minute(s)    Therapy Charges for Today     Code Description Service Date Service Provider  Modifiers Qty    [id number]  PT THER PROC EA 15 MIN 4/23/2018 Corinne E Perkins, PT GP 3                    Corinne E. Perkins, PT  4/24/2018

## 2018-04-25 ENCOUNTER — APPOINTMENT (OUTPATIENT)
Dept: PHYSICAL THERAPY | Facility: HOSPITAL | Age: 63
End: 2018-04-25
Attending: INTERNAL MEDICINE

## 2018-04-30 ENCOUNTER — HOSPITAL ENCOUNTER (OUTPATIENT)
Dept: PHYSICAL THERAPY | Facility: HOSPITAL | Age: 63
Setting detail: THERAPIES SERIES
Discharge: HOME | End: 2018-04-30
Attending: INTERNAL MEDICINE

## 2018-04-30 DIAGNOSIS — Z74.09 IMPAIRED FUNCTIONAL MOBILITY, BALANCE, GAIT, AND ENDURANCE: ICD-10-CM

## 2018-04-30 DIAGNOSIS — M19.90 ARTHRITIS: Primary | ICD-10-CM

## 2018-04-30 PROCEDURE — 97110 THERAPEUTIC EXERCISES: CPT | Performed by: PHYSICAL THERAPIST

## 2018-05-01 NOTE — THERAPY TREATMENT NOTE
"    Outpatient Physical Therapy Ortho Treatment Note  [facility]     Patient Name: Samantha Gaines  : 1955  MRN: [id number]  Today's Date: 2018      Visit Date: 2018    Visit Dx:    ICD-10-CM ICD-9-CM   1. Arthritis M19.90 716.90   2. Impaired functional mobility, balance, gait, and endurance Z74.09 V49.89       Patient Active Problem List   Diagnosis   • Diverticulitis of colon   • Hyperlipidemia   • Vitamin D deficiency   • Incisional hernia        Past Medical History:   Diagnosis Date   • Breast cyst    • Diverticulosis    • Essential hypertriglyceridemia    • PONV (postoperative nausea and vomiting)    • Vitamin D deficiency     \"HAD BLOODWORK RECENTLY AND MY DOCTOR DIDN'T SAY ANYTHING ABOUT THIS AT THAT TIME\"        Past Surgical History:   Procedure Laterality Date   • BREAST BIOPSY     • BREAST CYST EXCISION      PT UNSURE OF LATERALITY   • COLONOSCOPY  UNKNOWN   • DIAGNOSTIC LAPAROSCOPY     • HYSTERECTOMY     • OOPHORECTOMY     • VENTRAL/INCISIONAL HERNIA REPAIR N/A 2016    Procedure: OPEN INCISIONAL HERNIA REPAIR WITH MESH;  Surgeon: Maximino Sparks MD;  Location: [facility];  Service:                              PT Assessment/Plan     Row Name 18 1709          PT Assessment    Assessment Comments Pt is making steady progress towards her functional goals, verbalized compliance with current HEP and appropriate to decrease PT freq to 1x/week to conserve visits. Denies radicular symptoms in clinic and required fewer verbal cues for technique in clinic.   -CP        PT Plan    PT Plan Comments F/u with patient in one week. Give new written HEP per request for copy in order.   -CP       User Key  (r) = Recorded By, (t) = Taken By, (c) = Cosigned By    Initials Name Provider Type    CP Corinne E Perkins, PT Physical Therapist                    Exercises     Row Name 18 6573             Subjective Comments    Subjective Comments Pt states she has been doing HEP and busy " with work. No new symptom in LLE.   -CP         Subjective Pain    Able to rate subjective pain? yes  -CP      Pre-Treatment Pain Level 0  -CP      Post-Treatment Pain Level 0  -CP         Exercise 1    Exercise Name 1 Nustep Level 7, Elliptical Level 1  -CP      Time 1 8 minutes total   3 minutes Nustep; 4 min Elliptical  -CP      Additional Comments Total ther ex time:   -CP         Exercise 2    Exercise Name 2 Prone hip ext with flex ext  -CP      Sets 2 2  -CP      Reps 2 10x with 2lb ankle weight  -CP      Additional Comments second set with knee flexion  -CP         Exercise 3    Exercise Name 3 Hooklying ab set with march  -CP      Reps 3 10x each  -CP         Exercise 5    Exercise Name 5 hamstring stretch  -CP      Reps 5 3  -CP         Exercise 6    Exercise Name 6 SL bridge with SLR  -CP      Reps 6 10x each, slow/controlled  -CP         Exercise 7    Exercise Name 7 bird dogs, 2lb ankle weights   -CP      Cueing 7 Verbal  -CP      Reps 7 10x each  -CP         Exercise 8    Exercise Name 8 UE and LE standing matrix for core activation, with lunges (forward and lateral)  -CP      Cueing 8 Verbal  -CP      Reps 8 10x each  -CP      Additional Comments holding 2lb medicine ball  -CP         Exercise 9    Exercise Name 9 Side stepping, zig zag step, backward with BTB above knees  -CP      Reps 9 4 x 30ft  -CP      Additional Comments BTB with 10 lb KB  -CP         Exercise 10    Exercise Name 10 piriformis stretch seated  -CP      Reps 10 2  -CP         Exercise 11    Exercise Name 11 --  -CP        User Key  (r) = Recorded By, (t) = Taken By, (c) = Cosigned By    Initials Name Provider Type    CP Corinne E Perkins, PT Physical Therapist                             Therapy Education  Education Details: Increased time spent on pt education of HEP and progression of therapy. Reviewed current HEP and will re-organize order next visit.   Given: HEP  Program: Reinforced  How Provided: Verbal,  Demonstration  Provided to: Patient  Level of Understanding: Verbalized              Time Calculation:   Start Time: 1720  Total Timed Code Minutes- PT: 35 minute(s)    Therapy Charges for Today     Code Description Service Date Service Provider Modifiers Qty    [id number]  PT THER PROC EA 15 MIN 4/30/2018 Corinne E Perkins, PT GP 2                    Corinne E. Perkins, PT  5/1/2018

## 2018-05-02 ENCOUNTER — APPOINTMENT (OUTPATIENT)
Dept: PHYSICAL THERAPY | Facility: HOSPITAL | Age: 63
End: 2018-05-02
Attending: INTERNAL MEDICINE

## 2018-05-07 ENCOUNTER — HOSPITAL ENCOUNTER (OUTPATIENT)
Dept: PHYSICAL THERAPY | Facility: HOSPITAL | Age: 63
Setting detail: THERAPIES SERIES
Discharge: HOME | End: 2018-05-07
Attending: INTERNAL MEDICINE

## 2018-05-07 DIAGNOSIS — Z74.09 IMPAIRED FUNCTIONAL MOBILITY, BALANCE, GAIT, AND ENDURANCE: ICD-10-CM

## 2018-05-07 DIAGNOSIS — M19.90 ARTHRITIS: Primary | ICD-10-CM

## 2018-05-07 PROCEDURE — 97110 THERAPEUTIC EXERCISES: CPT | Performed by: PHYSICAL THERAPIST

## 2018-05-08 NOTE — THERAPY PROGRESS REPORT/RE-CERT
"    Outpatient Physical Therapy Ortho Re-Assessment  [facility]     Patient Name: Samantha Gaines  : 1955  MRN: [id number]  Today's Date: 2018      Visit Date: 2018    Patient Active Problem List   Diagnosis   • Diverticulitis of colon   • Hyperlipidemia   • Vitamin D deficiency   • Incisional hernia        Past Medical History:   Diagnosis Date   • Breast cyst    • Diverticulosis    • Essential hypertriglyceridemia    • PONV (postoperative nausea and vomiting)    • Vitamin D deficiency     \"HAD BLOODWORK RECENTLY AND MY DOCTOR DIDN'T SAY ANYTHING ABOUT THIS AT THAT TIME\"        Past Surgical History:   Procedure Laterality Date   • BREAST BIOPSY     • BREAST CYST EXCISION      PT UNSURE OF LATERALITY   • COLONOSCOPY  UNKNOWN   • DIAGNOSTIC LAPAROSCOPY     • HYSTERECTOMY     • OOPHORECTOMY     • VENTRAL/INCISIONAL HERNIA REPAIR N/A 2016    Procedure: OPEN INCISIONAL HERNIA REPAIR WITH MESH;  Surgeon: Maximino Sparks MD;  Location: [facility];  Service:        Visit Dx:     ICD-10-CM ICD-9-CM   1. Arthritis M19.90 716.90   2. Impaired functional mobility, balance, gait, and endurance Z74.09 V49.89                 PT Ortho     Row Name 18 1700       Subjective Pain    Post-Treatment Pain Level 0  -CP       Special Tests/Palpation    Special Tests/Palpation Lumbar/SI  -CP       Lumbosacral Palpation    Lumbosacral Segment --   denies tenderness  -CP    Piriformis Left:;Guarded/taut  -CP       Lumbar/SI Special Tests    Slump Test (Neural Tension) Negative  -CP    Neva Poncho Test (HNP) Bilateral:;Negative  -CP    SLR (Neural Tension) Bilateral:;Negative  -CP    TYRELL (hip vs. SI Dysfunction) Bilateral:;Negative  -CP    [place] Test (SI Dysfunction) Negative  -CP       Head/Neck/Trunk    Trunk Extension AROM 29  -CP    Trunk Flexion AROM 60  -CP    Trunk Lt Lateral Flexion AROM 33  -CP    Trunk Lt Lateral Flexion PROM --  -CP    Trunk Rt Lateral Flexion AROM 33  -CP    Trunk Lt " Rotation AROM WFL  -CP    Trunk Rt Rotation AROM WFL  -CP       Left Hip (Manual Muscle Testing)    Left Hip Manual Muscle Testing (MMT) flexion;abduction;adduction  -CP    MMT: Flexion, Left Hip flexion  -CP    MMT, Gross Movement: Left Hip Flexion (4+/5) good plus  -CP    MMT: ABduction, Left Hip abduction  -CP    MMT, Gross Movement: Left Hip ABduction (4+/5) good plus  -CP    MMT, Gross Movement: Left Hip ADduction (4+/5) good plus  -CP       Right Hip (Manual Muscle Testing)    Right Hip Manual Muscle Testing (MMT) flexion;abduction;adduction  -CP    MMT: Flexion, Right Hip flexion  -CP    MMT, Gross Movement: Right Hip Flexion (4+/5) good plus  -CP    MMT: ABduction, Right Hip abduction  -CP    MMT, Gross Movement: Right Hip ABduction (4+/5) good plus  -CP    MMT, Gross Movement: Right Hip ADduction (4+/5) good plus  -CP       Left Knee (Manual Muscle Testing)    Left Knee Manual Muscle Testing (MMT) extension  -CP    MMT: Extension, Left Knee extension  -CP    MMT, Gross Movement: Left Knee Extension (4+/5) good plus  -CP    MMT: Flexion, Left Knee flexion  -CP    MMT, Gross Movement: Left Knee Flexion (4+/5) good plus  -CP       Right Knee (Manual Muscle Testing)    Right Knee Manual Muscle Testing (MMT) extension;flexion  -CP    MMT: Extension, Right Knee extension  -CP    MMT, Gross Movement: Right Knee Extension (4+/5) good plus  -CP    MMT: Flexion, Right Knee flexion  -CP    MMT, Gross Movement: Right Knee Flexion (4+/5) good plus  -CP       Left Ankle/Foot (Manual Muscle Testing)    Left Ankle Manual Muscle Testing (MMT) plantarflexion;dorsiflexion  -CP    MMT: Dorsiflexion Left Ankle Muscles dorsiflexion  -CP    MMT, Gross Movement: Left Ankle Dorsiflexion (5/5) normal  -CP       Right Ankle/Foot (Manual Muscle Testing)    MMT: Dorsiflexion, Right Ankle Muscles dorsiflexion  -CP    MMT, Gross Movement: Right Ankle Dorsiflexion (5/5) normal  -CP       Sensation    Light Touch No apparent deficits  -CP        Lower Extremity Flexibility    Hamstrings WNL;Bilateral:  -CP    Hip External Rotators Bilateral:;WNL  -CP    Gastrocnemius WNL  -CP       Transfers    Comment (Transfers) Improved sit-stand without use of BUE, 0/10 pain.   -CP       Gait/Stairs Assessment/Training    [place] Level (Gait) independent  -CP    Negotiation (Stairs) stairs independence  -CP    [place] Level (Stairs) independent  -CP    Number of Steps (Stairs) 13  -CP    Ascending Technique (Stairs) step-over-step  -CP    Descending Technique (Stairs) step-over-step  -CP      User Key  (r) = Recorded By, (t) = Taken By, (c) = Cosigned By    Initials Name Provider Type    CP Corinne E Perkins, PT Physical Therapist                      Therapy Education  Education Details: HEP reviewed with patient with new copy for home, including standing, prone, quadruped, and prone positioning. Pt verbalized understanding.   Given: HEP, Symptoms/condition management  Program: Reinforced  How Provided: Verbal  Provided to: Patient  Level of Understanding: Verbalized           PT OP Goals     Row Name 05/07/18 1700          PT Short Term Goals    STG Date to Achieve 04/18/18  -CP     STG 1 Patient subjectively report that altered left leg symptoms have decreased by 50% with frequency or intensity.  -CP     STG 1 Progress Met  -CP     STG 2 Patient is independent with HEP for flexibility and strengthening.  -CP     STG 2 Progress Met  -CP        Long Term Goals    LTG Date to Achieve 06/07/18  -CP     LTG 1 Patient will demonstrate lumbar AROM WNL in all planes to improve ability to perform daily functional activities.  -CP     LTG 1 Progress Partially Met;Ongoing  -CP     LTG 2 Modified Oswestry score is improved by at least 10%.  -CP     LTG 2 Progress Met  -CP     LTG 3 Patient is independent with long term HEP for improved postural awareness and stabilization.  -CP     LTG 3 Progress Ongoing;Progressing  -CP     LTG 4 Patient is able to tolerate at  least 30 min of standing or walking to improved tolerance to ADLs.  -CP     LTG 4 Progress Ongoing;Progressing  -CP        Time Calculation    PT Goal Re-Cert Due Date 06/05/18  -CP       User Key  (r) = Recorded By, (t) = Taken By, (c) = Cosigned By    Initials Name Provider Type    CP Corinne E Perkins, PT Physical Therapist                PT Assessment/Plan     Row Name 05/07/18 1700          PT Assessment    Functional Limitations Limitation in home management;Limitations in community activities;Performance in leisure activities;Performance in work activities  -CP     Impairments Endurance;Posture;Poor body mechanics;Pain;Muscle strength;Joint mobility  -CP     Assessment Comments Pt demonstrated improved BLE strength and lumbar AROM, progressing with functional mobility as noted with 10% improvement on her modified oswestry score. She denies radicular symptoms in clinic, compliant with current HEP. She would benefit from progressed core and spinal stabilization for further improvement in tolerance time standing, walking, and sitting to increase overall activity tolerance with work and home tasks.   -CP     Please refer to paper survey for additional self-reported information Yes  -CP     Rehab Potential Good  -CP     Patient/caregiver participated in establishment of treatment plan and goals Yes  -CP     Patient would benefit from skilled therapy intervention Yes  -CP        PT Plan    PT Frequency 1x/week  -CP     Planned CPT's? PT EVAL LOW COMPLEXITY: 19275;PT RE-EVAL: 80213;PT THER PROC EA 15 MIN: 60721;PT MANUAL THERAPY EA 15 MIN: 14681;PT NEUROMUSC RE-EDUCATION EA 15 MIN: 38697;PT ELECTRICAL STIM UNATTEND: ;PT ULTRASOUND EA 15 MIN: 91595;PT TRACTION LUMBAR: 15751;PT HOT/COLD PACK WC NONMCARE: 82858;PT IONTOPHORESIS EA 15 MIN: 08967  -CP     PT Plan Comments Assess compliance with HEP as discussed today. Progress in clinic next visit to include tall kneeling-stand transfer per patient request to  improve floor transfer. Anticipate d/c to home with advanced HEP in the next 2-3 visits.   -CP        Clinical Impression    Predicted Duration of Therapy Intervention (days/wks) 4 visits  -CP       User Key  (r) = Recorded By, (t) = Taken By, (c) = Cosigned By    Initials Name Provider Type    CP Corinne E Perkins, PT Physical Therapist                  Exercises     Row Name 05/07/18 1700             Subjective Comments    Subjective Comments Pt reports she was able to perform HEP 3x last week, states no symptoms in legs or low back on arrival.   -CP         Subjective Pain    Able to rate subjective pain? yes  -CP      Pre-Treatment Pain Level 0  -CP      Post-Treatment Pain Level 0  -CP         Exercise 1    Exercise Name 1 Elliptical Level 1  -CP      Time 1 4 min  -CP         Exercise 2    Exercise Name 2 Reassessment completed this date in clinic.   -CP      Additional Comments 39 minutes including elliptical and pt education on HEP.   -CP        User Key  (r) = Recorded By, (t) = Taken By, (c) = Cosigned By    Initials Name Provider Type    CP Corinne E Perkins, PT Physical Therapist                        Outcome Measure Options: Modlevy Kitchen  Modified Oswestry  Modified Oswestry Score/Comments: 14%      Time Calculation:   Start Time: 1720  Total Timed Code Minutes- PT: 39 minute(s)     Therapy Charges for Today     Code Description Service Date Service Provider Modifiers Qty    [id number]  PT THER PROC EA 15 MIN 5/7/2018 Corinne E Perkins, PT GP 3          PT G-Codes  Outcome Measure Options: Modifed [place]         Corinne E. Perkins, PT  5/8/2018

## 2018-05-09 ENCOUNTER — APPOINTMENT (OUTPATIENT)
Dept: PHYSICAL THERAPY | Facility: HOSPITAL | Age: 63
End: 2018-05-09
Attending: INTERNAL MEDICINE

## 2018-05-14 ENCOUNTER — HOSPITAL ENCOUNTER (OUTPATIENT)
Dept: PHYSICAL THERAPY | Facility: HOSPITAL | Age: 63
Setting detail: THERAPIES SERIES
Discharge: HOME | End: 2018-05-14
Attending: INTERNAL MEDICINE

## 2018-05-14 DIAGNOSIS — M19.90 ARTHRITIS: Primary | ICD-10-CM

## 2018-05-14 DIAGNOSIS — Z74.09 IMPAIRED FUNCTIONAL MOBILITY, BALANCE, GAIT, AND ENDURANCE: ICD-10-CM

## 2018-05-14 PROCEDURE — 97110 THERAPEUTIC EXERCISES: CPT | Performed by: PHYSICAL THERAPIST

## 2018-05-15 NOTE — THERAPY TREATMENT NOTE
"    Outpatient Physical Therapy Ortho Treatment Note  [facility]     Patient Name: Samantha Gaines  : 1955  MRN: [id number]  Today's Date: 5/15/2018      Visit Date: 2018    Visit Dx:    ICD-10-CM ICD-9-CM   1. Arthritis M19.90 716.90   2. Impaired functional mobility, balance, gait, and endurance Z74.09 V49.89       Patient Active Problem List   Diagnosis   • Diverticulitis of colon   • Hyperlipidemia   • Vitamin D deficiency   • Incisional hernia        Past Medical History:   Diagnosis Date   • Breast cyst    • Diverticulosis    • Essential hypertriglyceridemia    • PONV (postoperative nausea and vomiting)    • Vitamin D deficiency     \"HAD BLOODWORK RECENTLY AND MY DOCTOR DIDN'T SAY ANYTHING ABOUT THIS AT THAT TIME\"        Past Surgical History:   Procedure Laterality Date   • BREAST BIOPSY     • BREAST CYST EXCISION      PT UNSURE OF LATERALITY   • COLONOSCOPY  UNKNOWN   • DIAGNOSTIC LAPAROSCOPY     • HYSTERECTOMY     • OOPHORECTOMY     • VENTRAL/INCISIONAL HERNIA REPAIR N/A 2016    Procedure: OPEN INCISIONAL HERNIA REPAIR WITH MESH;  Surgeon: Maximino Sparks MD;  Location: [facility];  Service:                              PT Assessment/Plan     Row Name 18 1411          PT Assessment    Assessment Comments Pt able to perform floor to mat transfer without increase pain. Pt educated on sitting position options to be able to tolerate sitting in floor without LBP or leg symptoms.   -CP        PT Plan    PT Plan Comments Anticipate d/c next visit to conserve visits. Progress HEP and review modifications as needed next visit.   -CP       User Key  (r) = Recorded By, (t) = Taken By, (c) = Cosigned By    Initials Name Provider Type    CP Corinne E Perkins, PT Physical Therapist                    Exercises     Row Name 18 2222             Subjective Comments    Subjective Comments Patient states she is doing well with her HEP and managing symptoms. She denies any recent change in " pain.   -CP         Subjective Pain    Able to rate subjective pain? yes  -CP      Pre-Treatment Pain Level 0  -CP      Post-Treatment Pain Level 0  -CP         Exercise 1    Exercise Name 1 Elliptical Level 1  -CP      Time 1 5 minutes  -CP      Additional Comments Total ther ex time including pt education: 31 minutes  -CP         Exercise 2    Exercise Name 2 floor to mat transfer education and practice  -CP      Cueing 2 Verbal;Demo  -CP      Reps 2 4  -CP         Exercise 4    Exercise Name 4 Deadlift with 5lb DB  -CP      Cueing 4 Verbal  -CP      Reps 4 15x   -CP         Exercise 5    Exercise Name 5 hamstring stretch  -CP      Reps 5 3  -CP         Exercise 6    Exercise Name 6 SL bridge with SLR  -CP      Cueing 6 Verbal  -CP      Reps 6 10x each, slow/controlled  -CP         Exercise 7    Exercise Name 7 bird dogs, 2.5lb ankle weights   -CP      Cueing 7 Demo  -CP      Reps 7 12x each  -CP         Exercise 8    Exercise Name 8 Sit-stands with 10lb KB  -CP      Reps 8 15x  -CP         Exercise 9    Exercise Name 9 Side stepping, zig zag step, backward with BTB above knees  -CP      Reps 9 4 x 30ft  -CP      Additional Comments BTB with 10lb KB  -CP         Exercise 10    Exercise Name 10 piriformis stretch seated  -CP      Reps 10 2  -CP        User Key  (r) = Recorded By, (t) = Taken By, (c) = Cosigned By    Initials Name Provider Type    CP Corinne E Perkins, PT Physical Therapist                        Manual Rx (last 36 hours)      Manual Treatments     Row Name 05/14/18 1700             Manual Rx 1    Manual Rx 1 Location L piriformis  -CP      Manual Rx 1 Type DN   performed by Ras Lee, DORINDA, DPT  -CP      Manual Rx 1 Grade no LTR noted   3 minutes  -CP      Manual Rx 1 Duration informed consent from patient; no adverse reactions in clinic.   -CP        User Key  (r) = Recorded By, (t) = Taken By, (c) = Cosigned By    Initials Name Provider Type    CP Corinne E Perkins, PT Physical Therapist                              Time Calculation:   Start Time: 1715  Total Timed Code Minutes- PT: 34 minute(s)    Therapy Charges for Today     Code Description Service Date Service Provider Modifiers Qty    30139518830 HC PT THER PROC EA 15 MIN 5/14/2018 Corinne E Perkins, PT GP 2                    Corinne E. Perkins, PT  5/15/2018

## 2018-05-16 ENCOUNTER — APPOINTMENT (OUTPATIENT)
Dept: PHYSICAL THERAPY | Facility: HOSPITAL | Age: 63
End: 2018-05-16
Attending: INTERNAL MEDICINE

## 2018-05-21 ENCOUNTER — HOSPITAL ENCOUNTER (OUTPATIENT)
Dept: PHYSICAL THERAPY | Facility: HOSPITAL | Age: 63
Setting detail: THERAPIES SERIES
Discharge: HOME | End: 2018-05-21
Attending: INTERNAL MEDICINE

## 2018-05-21 DIAGNOSIS — M19.90 ARTHRITIS: ICD-10-CM

## 2018-05-21 DIAGNOSIS — Z74.09 IMPAIRED FUNCTIONAL MOBILITY, BALANCE, GAIT, AND ENDURANCE: Primary | ICD-10-CM

## 2018-05-21 PROCEDURE — 97110 THERAPEUTIC EXERCISES: CPT | Performed by: PHYSICAL THERAPIST

## 2018-05-21 NOTE — THERAPY TREATMENT NOTE
"    Outpatient Physical Therapy Ortho Treatment Note  [facility]     Patient Name: Samantha Gaines  : 1955  MRN: [id number]  Today's Date: 2018      Visit Date: 2018    Visit Dx:    ICD-10-CM ICD-9-CM   1. Impaired functional mobility, balance, gait, and endurance Z74.09 V49.89   2. Arthritis M19.90 716.90       Patient Active Problem List   Diagnosis   • Diverticulitis of colon   • Hyperlipidemia   • Vitamin D deficiency   • Incisional hernia        Past Medical History:   Diagnosis Date   • Breast cyst    • Diverticulosis    • Essential hypertriglyceridemia    • PONV (postoperative nausea and vomiting)    • Vitamin D deficiency     \"HAD BLOODWORK RECENTLY AND MY DOCTOR DIDN'T SAY ANYTHING ABOUT THIS AT THAT TIME\"        Past Surgical History:   Procedure Laterality Date   • BREAST BIOPSY     • BREAST CYST EXCISION      PT UNSURE OF LATERALITY   • COLONOSCOPY  UNKNOWN   • DIAGNOSTIC LAPAROSCOPY     • HYSTERECTOMY     • OOPHORECTOMY     • VENTRAL/INCISIONAL HERNIA REPAIR N/A 2016    Procedure: OPEN INCISIONAL HERNIA REPAIR WITH MESH;  Surgeon: Maximino Sparks MD;  Location: [facility];  Service:                              PT Assessment/Plan     Row Name 18 1700          PT Assessment    Assessment Comments Pt has met all established functional goals, verbalized 0/10 pain and indep with current HEP and POC. She is appropriate to d/c to home with increased emphasis on strengthening and spinal stabilization. Pt agreeable to plan to conserve visits and d/c home.   -CP        PT Plan    PT Plan Comments Pt will be d/c to home with HEP.  -CP       User Key  (r) = Recorded By, (t) = Taken By, (c) = Cosigned By    Initials Name Provider Type    CP Corinne E Perkins, PT Physical Therapist                    Exercises     Row Name 18 1703             Subjective Comments    Subjective Comments Pt states she has been able to maintain compliance with HEP, reports 0/10 RLE symptoms in " clinic. Minimal change after DN last visit.   -CP         Subjective Pain    Able to rate subjective pain? yes  -CP      Pre-Treatment Pain Level 0  -CP      Post-Treatment Pain Level 0  -CP         Exercise 1    Exercise Name 1 Elliptical Level 1  -CP      Time 1 5 minutes  -CP         Exercise 2    Exercise Name 2 reviewed floor to mat transfer, current HEP, deadlift technique, squatting, avoiding prolonged lumbar flexion position, and prolonged sitting  -CP         Exercise 4    Exercise Name 4 Deadlift with 5lb DB  -CP      Reps 4 8x  -CP         Exercise 5    Exercise Name 5 hamstring stretch  -CP      Reps 5 3  -CP         Exercise 6    Exercise Name 6 SL bridge with SLR  -CP      Reps 6 5x each  -CP         Exercise 9    Exercise Name 9 Side stepping, zig zag step, backward with BTB above knees  -CP      Reps 9 4 x 30ft  -CP         Exercise 10    Exercise Name 10 piriformis stretch seated  -CP      Reps 10 2  -CP        User Key  (r) = Recorded By, (t) = Taken By, (c) = Cosigned By    Initials Name Provider Type    CP Corinne E Perkins, PT Physical Therapist                               PT OP Goals     Row Name 05/21/18 1700          PT Short Term Goals    STG Date to Achieve 04/18/18  -CP     STG 1 Patient subjectively report that altered left leg symptoms have decreased by 50% with frequency or intensity.  -CP     STG 1 Progress Met  -CP     STG 2 Patient is independent with HEP for flexibility and strengthening.  -CP     STG 2 Progress Met  -CP        Long Term Goals    LTG Date to Achieve 06/07/18  -CP     LTG 1 Patient will demonstrate lumbar AROM WNL in all planes to improve ability to perform daily functional activities.  -CP     LTG 1 Progress Met  -CP     LTG 2 Modified Oswestry score is improved by at least 10%.  -CP     LTG 2 Progress Met  -CP     LTG 3 Patient is independent with long term HEP for improved postural awareness and stabilization.  -CP     LTG 3 Progress Met  -CP     LTG 4 Patient  is able to tolerate at least 30 min of standing or walking to improved tolerance to ADLs.  -CP     LTG 4 Progress Met  -CP       User Key  (r) = Recorded By, (t) = Taken By, (c) = Cosigned By    Initials Name Provider Type    CP Corinne E Perkins, PT Physical Therapist          Therapy Education  Education Details: HEP reviewed with patient, proper squatting and deadlift technique discussed; Pt verbalized indep and correct technique with floor transfers and avoiding prolonged poor lumbar flexion positioning to avoid LLE symptoms.   Given: HEP, Symptoms/condition management, Posture/body mechanics  Program: Reinforced  How Provided: Verbal, Demonstration  Provided to: Patient  Level of Understanding: Verbalized, Demonstrated              Time Calculation:   Start Time: 1709  Total Timed Code Minutes- PT: 24 minute(s)    Therapy Charges for Today     Code Description Service Date Service Provider Modifiers Qty    63191773612 HC PT THER PROC EA 15 MIN 5/21/2018 Corinne E Perkins, PT GP 2                    Corinne E. Perkins, PT  5/21/2018

## 2018-05-23 ENCOUNTER — APPOINTMENT (OUTPATIENT)
Dept: PHYSICAL THERAPY | Facility: HOSPITAL | Age: 63
End: 2018-05-23
Attending: INTERNAL MEDICINE

## 2018-05-30 ENCOUNTER — APPOINTMENT (OUTPATIENT)
Dept: PHYSICAL THERAPY | Facility: HOSPITAL | Age: 63
End: 2018-05-30
Attending: INTERNAL MEDICINE

## 2018-06-04 ENCOUNTER — APPOINTMENT (OUTPATIENT)
Dept: PHYSICAL THERAPY | Facility: HOSPITAL | Age: 63
End: 2018-06-04
Attending: INTERNAL MEDICINE

## 2018-06-11 ENCOUNTER — APPOINTMENT (OUTPATIENT)
Dept: PHYSICAL THERAPY | Facility: HOSPITAL | Age: 63
End: 2018-06-11
Attending: INTERNAL MEDICINE

## 2018-06-18 ENCOUNTER — APPOINTMENT (OUTPATIENT)
Dept: PHYSICAL THERAPY | Facility: HOSPITAL | Age: 63
End: 2018-06-18
Attending: INTERNAL MEDICINE

## 2018-06-19 ENCOUNTER — DOCUMENTATION (OUTPATIENT)
Dept: PHYSICAL THERAPY | Facility: HOSPITAL | Age: 63
End: 2018-06-19

## 2018-06-19 DIAGNOSIS — M19.90 ARTHRITIS: ICD-10-CM

## 2018-06-19 DIAGNOSIS — Z74.09 IMPAIRED FUNCTIONAL MOBILITY, BALANCE, GAIT, AND ENDURANCE: Primary | ICD-10-CM

## 2018-06-19 NOTE — THERAPY DISCHARGE NOTE
Outpatient Physical Therapy Discharge Summary         Patient Name: Samantha Gaines  : 1955  MRN: [id number]    Today's Date: 2018    Visit Dx:    ICD-10-CM ICD-9-CM   1. Impaired functional mobility, balance, gait, and endurance Z74.09 V49.89   2. Arthritis M19.90 716.90             PT OP Goals     Row Name 18 1100          PT Short Term Goals    STG Date to Achieve 18  -CP     STG 1 Patient subjectively report that altered left leg symptoms have decreased by 50% with frequency or intensity.  -CP     STG 1 Progress Met  -CP     STG 2 Patient is independent with HEP for flexibility and strengthening.  -CP     STG 2 Progress Met  -CP        Long Term Goals    LTG Date to Achieve 18  -CP     LTG 1 Patient will demonstrate lumbar AROM WNL in all planes to improve ability to perform daily functional activities.  -CP     LTG 1 Progress Met  -CP     LTG 2 Modified Oswestry score is improved by at least 10%.  -CP     LTG 2 Progress Met  -CP     LTG 3 Patient is independent with long term HEP for improved postural awareness and stabilization.  -CP     LTG 3 Progress Met  -CP     LTG 4 Patient is able to tolerate at least 30 min of standing or walking to improved tolerance to ADLs.  -CP     LTG 4 Progress Met  -CP       User Key  (r) = Recorded By, (t) = Taken By, (c) = Cosigned By    Initials Name Provider Type    CP Corinne E Perkins, PT Physical Therapist          OP PT Discharge Summary  Date of Discharge: 18  Reason for Discharge: All goals achieved  Outcomes Achieved: Able to achieve all goals within established timeline  Discharge Destination: Home with home program      Time Calculation:        Therapy Suggested Charges     Code   Minutes Charges    None                       Corinne E. Perkins, PT  2018

## 2018-06-25 ENCOUNTER — APPOINTMENT (OUTPATIENT)
Dept: PHYSICAL THERAPY | Facility: HOSPITAL | Age: 63
End: 2018-06-25
Attending: INTERNAL MEDICINE

## 2018-08-15 ENCOUNTER — OFFICE VISIT (OUTPATIENT)
Dept: INTERNAL MEDICINE | Facility: CLINIC | Age: 63
End: 2018-08-15

## 2018-08-15 VITALS
OXYGEN SATURATION: 99 % | SYSTOLIC BLOOD PRESSURE: 132 MMHG | HEART RATE: 74 BPM | BODY MASS INDEX: 32.44 KG/M2 | WEIGHT: 201 LBS | DIASTOLIC BLOOD PRESSURE: 78 MMHG

## 2018-08-15 DIAGNOSIS — R30.0 DYSURIA: Primary | ICD-10-CM

## 2018-08-15 LAB
BILIRUB BLD-MCNC: NEGATIVE MG/DL
CLARITY, POC: CLEAR
COLOR UR: YELLOW
GLUCOSE UR STRIP-MCNC: NEGATIVE MG/DL
KETONES UR QL: NEGATIVE
LEUKOCYTE EST, POC: ABNORMAL
NITRITE UR-MCNC: NEGATIVE MG/ML
PH UR: 5 [PH] (ref 5–8)
PROT UR STRIP-MCNC: NEGATIVE MG/DL
RBC # UR STRIP: NEGATIVE /UL
SP GR UR: 1.02 (ref 1–1.03)
UROBILINOGEN UR QL: NORMAL

## 2018-08-15 PROCEDURE — 99213 OFFICE O/P EST LOW 20 MIN: CPT | Performed by: NURSE PRACTITIONER

## 2018-08-15 PROCEDURE — 81003 URINALYSIS AUTO W/O SCOPE: CPT | Performed by: NURSE PRACTITIONER

## 2018-08-15 PROCEDURE — 87086 URINE CULTURE/COLONY COUNT: CPT | Performed by: NURSE PRACTITIONER

## 2018-08-15 RX ORDER — SULFAMETHOXAZOLE AND TRIMETHOPRIM 800; 160 MG/1; MG/1
1 TABLET ORAL 2 TIMES DAILY
Qty: 6 TABLET | Refills: 0 | Status: SHIPPED | OUTPATIENT
Start: 2018-08-15 | End: 2018-09-21

## 2018-08-15 NOTE — PATIENT INSTRUCTIONS
Dysuria  Dysuria is pain or discomfort while urinating. The pain or discomfort may be felt in the tube that carries urine out of the bladder (urethra) or in the surrounding tissue of the genitals. The pain may also be felt in the groin area, lower abdomen, and lower back. You may have to urinate frequently or have the sudden feeling that you have to urinate (urgency). Dysuria can affect both men and women, but is more common in women.  Dysuria can be caused by many different things, including:  · Urinary tract infection in women.  · Infection of the kidney or bladder.  · Kidney stones or bladder stones.  · Certain sexually transmitted infections (STIs), such as chlamydia.  · Dehydration.  · Inflammation of the vagina.  · Use of certain medicines.  · Use of certain soaps or scented products that cause irritation.    Follow these instructions at home:  Watch your dysuria for any changes. The following actions may help to reduce any discomfort you are feeling:  · Drink enough fluid to keep your urine clear or pale yellow.  · Empty your bladder often. Avoid holding urine for long periods of time.  · After a bowel movement or urination, women should cleanse from front to back, using each tissue only once.  · Empty your bladder after sexual intercourse.  · Take medicines only as directed by your health care provider.  · If you were prescribed an antibiotic medicine, finish it all even if you start to feel better.  · Avoid caffeine, tea, and alcohol. They can irritate the bladder and make dysuria worse. In men, alcohol may irritate the prostate.  · Keep all follow-up visits as directed by your health care provider. This is important.  · If you had any tests done to find the cause of dysuria, it is your responsibility to obtain your test results. Ask the lab or department performing the test when and how you will get your results. Talk with your health care provider if you have any questions about your results.    Contact a  health care provider if:  · You develop pain in your back or sides.  · You have a fever.  · You have nausea or vomiting.  · You have blood in your urine.  · You are not urinating as often as you usually do.  Get help right away if:  · You pain is severe and not relieved with medicines.  · You are unable to hold down any fluids.  · You or someone else notices a change in your mental function.  · You have a rapid heartbeat at rest.  · You have shaking or chills.  · You feel extremely weak.  This information is not intended to replace advice given to you by your health care provider. Make sure you discuss any questions you have with your health care provider.  Document Released: 09/15/2005 Document Revised: 05/25/2017 Document Reviewed: 08/13/2015  [employer] Patient Education © 2018 [employer]      Urine had some leukocytes.  We will treat as if infection.  We will use Bactrim DS 1 by mouth twice a day for 3 days.  Urine culture pending.  Return to the clinic or go to the emergency department if her symptoms worsen

## 2018-08-15 NOTE — PROGRESS NOTES
Subjective   Samantha Gaines is a 63 y.o. female.   Chief Complaint   Patient presents with   • Urinary Tract Infection     discomfort, no burning, blood or itching, feels bloated and pressure and has to pee alot, having intestine issues, when she goes theres a cramping feeling goes a little then has to go again to poop keeps having to go back.      History of Present Illness As above. Onset few days ago.  Has mucus in stools,.  Not taking anything for symptoms.  Patient denies fever chills, headache, ear pain, sore throat, shortness of air, cough, chest pain, abdominal pain, nausea vomiting   blood in stool or urine. Mood is good.  Eating and drinking as usual.    The following portions of the patient's history were reviewed and updated as appropriate: allergies, current medications, past family history, past medical history, past social history, past surgical history and problem list.    Current Outpatient Prescriptions:   •  sulfamethoxazole-trimethoprim (BACTRIM DS) 800-160 MG per tablet, Take 1 tablet by mouth 2 (Two) Times a Day., Disp: 6 tablet, Rfl: 0    Review of Systems Consitutional, HEENT, Respiratory, CV, GI, , Skin, Musculoskeletal, Neuro-mental, Endocrinological, Hematological were reviewed.  Positives were discussed in the HPI, otherwise ROS was negative   /78   Pulse 74   Wt 91.2 kg (201 lb)   SpO2 99%   BMI 32.44 kg/m²     Objective   Allergies   Allergen Reactions   • Penicillins Hives       Physical Exam   Constitutional: No distress.   Cardiovascular: Normal rate, regular rhythm and normal heart sounds.    Pulmonary/Chest: Effort normal and breath sounds normal.   Abdominal: Soft. Bowel sounds are normal.   Tender over bladder, no CVA flank tenderness   Skin: Skin is warm and dry.   Is pink, no rash    Nursing note and vitals reviewed.      Procedures    Assessment/Plan   Samantha was seen today for urinary tract infection.    Diagnoses and all orders for this visit:    Dysuria  -      POCT urinalysis dipstick, automated  -     sulfamethoxazole-trimethoprim (BACTRIM DS) 800-160 MG per tablet; Take 1 tablet by mouth 2 (Two) Times a Day.  -     Urine Culture - Urine, Urine, Clean Catch; Future  -     Urine Culture - Urine, Urine, Clean Catch              Dulce Lee, APRN

## 2018-08-17 ENCOUNTER — TELEPHONE (OUTPATIENT)
Dept: INTERNAL MEDICINE | Facility: CLINIC | Age: 63
End: 2018-08-17

## 2018-08-17 LAB — BACTERIA SPEC AEROBE CULT: NORMAL

## 2018-08-20 ENCOUNTER — TRANSCRIBE ORDERS (OUTPATIENT)
Dept: INTERNAL MEDICINE | Facility: CLINIC | Age: 63
End: 2018-08-20

## 2018-08-20 ENCOUNTER — HOSPITAL ENCOUNTER (OUTPATIENT)
Dept: MAMMOGRAPHY | Facility: HOSPITAL | Age: 63
Discharge: HOME | End: 2018-08-20
Attending: INTERNAL MEDICINE | Admitting: INTERNAL MEDICINE

## 2018-08-20 ENCOUNTER — TRANSCRIBE ORDERS (OUTPATIENT)
Dept: MAMMOGRAPHY | Facility: HOSPITAL | Age: 63
End: 2018-08-20

## 2018-08-20 DIAGNOSIS — R92.8 ABNORMAL MAMMOGRAM: Primary | ICD-10-CM

## 2018-08-20 DIAGNOSIS — R92.8 ABNORMAL MAMMOGRAM: ICD-10-CM

## 2018-08-20 PROCEDURE — 77065 DX MAMMO INCL CAD UNI: CPT

## 2018-08-20 PROCEDURE — 77065 DX MAMMO INCL CAD UNI: CPT | Performed by: RADIOLOGY

## 2018-08-22 ENCOUNTER — TELEPHONE (OUTPATIENT)
Dept: INTERNAL MEDICINE | Facility: CLINIC | Age: 63
End: 2018-08-22

## 2018-09-21 ENCOUNTER — OFFICE VISIT (OUTPATIENT)
Dept: INTERNAL MEDICINE | Facility: CLINIC | Age: 63
End: 2018-09-21

## 2018-09-21 VITALS
HEART RATE: 77 BPM | HEIGHT: 66 IN | OXYGEN SATURATION: 98 % | DIASTOLIC BLOOD PRESSURE: 90 MMHG | SYSTOLIC BLOOD PRESSURE: 140 MMHG | WEIGHT: 200 LBS | BODY MASS INDEX: 32.14 KG/M2

## 2018-09-21 DIAGNOSIS — L23.7 CONTACT DERMATITIS DUE TO POISON IVY: ICD-10-CM

## 2018-09-21 DIAGNOSIS — M25.552 LEFT HIP PAIN: ICD-10-CM

## 2018-09-21 DIAGNOSIS — E78.5 HYPERLIPIDEMIA LDL GOAL <100: Primary | ICD-10-CM

## 2018-09-21 LAB
ALBUMIN SERPL-MCNC: 4.38 G/DL (ref 3.2–4.8)
ALBUMIN/GLOB SERPL: 2 G/DL (ref 1.5–2.5)
ALP SERPL-CCNC: 72 U/L (ref 25–100)
ALT SERPL W P-5'-P-CCNC: 27 U/L (ref 7–40)
ANION GAP SERPL CALCULATED.3IONS-SCNC: 8 MMOL/L (ref 3–11)
ARTICHOKE IGE QN: 128 MG/DL (ref 0–130)
AST SERPL-CCNC: 24 U/L (ref 0–33)
BILIRUB SERPL-MCNC: 0.7 MG/DL (ref 0.3–1.2)
BUN BLD-MCNC: 17 MG/DL (ref 9–23)
BUN/CREAT SERPL: 21.8 (ref 7–25)
CALCIUM SPEC-SCNC: 9.4 MG/DL (ref 8.7–10.4)
CHLORIDE SERPL-SCNC: 105 MMOL/L (ref 99–109)
CHOLEST SERPL-MCNC: 231 MG/DL (ref 0–200)
CO2 SERPL-SCNC: 28 MMOL/L (ref 20–31)
CREAT BLD-MCNC: 0.78 MG/DL (ref 0.6–1.3)
GFR SERPL CREATININE-BSD FRML MDRD: 74 ML/MIN/1.73
GLOBULIN UR ELPH-MCNC: 2.2 GM/DL
GLUCOSE BLD-MCNC: 86 MG/DL (ref 70–100)
HDLC SERPL-MCNC: 70 MG/DL (ref 40–60)
POTASSIUM BLD-SCNC: 4.6 MMOL/L (ref 3.5–5.5)
PROT SERPL-MCNC: 6.6 G/DL (ref 5.7–8.2)
SODIUM BLD-SCNC: 141 MMOL/L (ref 132–146)
TRIGL SERPL-MCNC: 184 MG/DL (ref 0–150)

## 2018-09-21 PROCEDURE — 96372 THER/PROPH/DIAG INJ SC/IM: CPT | Performed by: INTERNAL MEDICINE

## 2018-09-21 PROCEDURE — 80061 LIPID PANEL: CPT | Performed by: INTERNAL MEDICINE

## 2018-09-21 PROCEDURE — 80053 COMPREHEN METABOLIC PANEL: CPT | Performed by: INTERNAL MEDICINE

## 2018-09-21 PROCEDURE — 99214 OFFICE O/P EST MOD 30 MIN: CPT | Performed by: INTERNAL MEDICINE

## 2018-09-21 RX ORDER — METHYLPREDNISOLONE ACETATE 40 MG/ML
40 INJECTION, SUSPENSION INTRA-ARTICULAR; INTRALESIONAL; INTRAMUSCULAR; SOFT TISSUE ONCE
Status: COMPLETED | OUTPATIENT
Start: 2018-09-21 | End: 2018-09-21

## 2018-09-21 RX ORDER — METHYLPREDNISOLONE 4 MG/1
TABLET ORAL
Qty: 21 EACH | Refills: 0 | Status: SHIPPED | OUTPATIENT
Start: 2018-09-21 | End: 2018-11-13

## 2018-09-21 RX ADMIN — METHYLPREDNISOLONE ACETATE 40 MG: 40 INJECTION, SUSPENSION INTRA-ARTICULAR; INTRALESIONAL; INTRAMUSCULAR; SOFT TISSUE at 09:42

## 2018-09-21 NOTE — PROGRESS NOTES
"Subjective   Samantha Gaines is a 63 y.o. female.   Chief Complaint   Patient presents with   • Posion Basilia     Pt in today for possible posion ivy on right forearm x Saturday   • Left Hip pain     f/u       History of Present Illness   F/u on left hip pain. Has gotten better with PT. She continues to do the home health exercises.  Poison Ivy on right arm for few days. Itching. No pain. No fever.  Hx hlp that has been diet controlled.   The following portions of the patient's history were reviewed and updated as appropriate: allergies, current medications, past family history, past medical history, past social history, past surgical history and problem list.    Review of Systems   Constitutional: Negative for activity change, appetite change, chills, diaphoresis, fatigue, fever and unexpected weight change.   HENT: Negative for congestion, ear discharge, ear pain, mouth sores, nosebleeds, sinus pressure, sneezing and sore throat.    Eyes: Negative for pain, discharge and itching.   Respiratory: Negative for cough, chest tightness, shortness of breath and wheezing.    Cardiovascular: Negative for chest pain, palpitations and leg swelling.   Gastrointestinal: Negative for abdominal pain, constipation, diarrhea, nausea and vomiting.   Endocrine: Negative for cold intolerance, heat intolerance, polydipsia and polyphagia.   Genitourinary: Negative for dysuria, flank pain, frequency, hematuria and urgency.   Musculoskeletal: Negative for arthralgias, back pain, gait problem, myalgias, neck pain and neck stiffness.   Skin: Negative for color change, pallor and rash.   Neurological: Negative for seizures, speech difficulty, numbness and headaches.   Psychiatric/Behavioral: Negative for agitation, confusion, decreased concentration and sleep disturbance. The patient is not nervous/anxious.      /90   Pulse 77   Ht 167.6 cm (66\")   Wt 90.7 kg (200 lb)   SpO2 98%   BMI 32.28 kg/m²     Objective   Physical Exam "   Constitutional: She is oriented to person, place, and time. She appears well-developed.   HENT:   Head: Normocephalic.   Right Ear: External ear normal.   Left Ear: External ear normal.   Nose: Nose normal.   Mouth/Throat: Oropharynx is clear and moist.   Eyes: Pupils are equal, round, and reactive to light. Conjunctivae are normal.   Neck: No JVD present. No thyromegaly present.   Cardiovascular: Normal rate, regular rhythm and normal heart sounds.  Exam reveals no friction rub.    No murmur heard.  Pulmonary/Chest: Effort normal and breath sounds normal. No respiratory distress. She has no wheezes. She has no rales.   Abdominal: Soft. Bowel sounds are normal. She exhibits no distension. There is no tenderness. There is no guarding.   Musculoskeletal: She exhibits no edema or tenderness.   Lymphadenopathy:     She has no cervical adenopathy.   Neurological: She is oriented to person, place, and time. She displays normal reflexes. No cranial nerve deficit.   Skin: Rash (linear rash right arm) noted.   Psychiatric: Her behavior is normal.   Nursing note and vitals reviewed.      Assessment/Plan   Samantha was seen today for posion ivy and left hip pain.    Diagnoses and all orders for this visit:    Hyperlipidemia LDL goal <100  -     Comprehensive Metabolic Panel  -     Lipid Panel    Left hip pain  better  Contact dermatitis due to poison ivy  -     MethylPREDNISolone (MEDROL, WAQAR,) 4 MG tablet; Take as directed on package instructions.  -     methylPREDNISolone acetate (DEPO-medrol) injection 40 mg; Inject 1 mL into the appropriate muscle as directed by prescriber 1 (One) Time.

## 2018-11-13 ENCOUNTER — OFFICE VISIT (OUTPATIENT)
Dept: INTERNAL MEDICINE | Facility: CLINIC | Age: 63
End: 2018-11-13

## 2018-11-13 VITALS
HEIGHT: 66 IN | OXYGEN SATURATION: 99 % | SYSTOLIC BLOOD PRESSURE: 124 MMHG | BODY MASS INDEX: 33.27 KG/M2 | HEART RATE: 76 BPM | WEIGHT: 207 LBS | DIASTOLIC BLOOD PRESSURE: 90 MMHG

## 2018-11-13 DIAGNOSIS — Z00.00 HEALTHCARE MAINTENANCE: Primary | ICD-10-CM

## 2018-11-13 LAB
BASOPHILS # BLD AUTO: 0.05 10*3/MM3 (ref 0–0.2)
BASOPHILS NFR BLD AUTO: 0.5 % (ref 0–1)
BILIRUB BLD-MCNC: NEGATIVE MG/DL
CLARITY, POC: CLEAR
COLOR UR: YELLOW
DEPRECATED RDW RBC AUTO: 45.9 FL (ref 37–54)
EOSINOPHIL # BLD AUTO: 0.09 10*3/MM3 (ref 0–0.3)
EOSINOPHIL NFR BLD AUTO: 1 % (ref 0–3)
ERYTHROCYTE [DISTWIDTH] IN BLOOD BY AUTOMATED COUNT: 13.2 % (ref 11.3–14.5)
GLUCOSE UR STRIP-MCNC: NEGATIVE MG/DL
HCT VFR BLD AUTO: 43.6 % (ref 34.5–44)
HGB BLD-MCNC: 14.8 G/DL (ref 11.5–15.5)
IMM GRANULOCYTES # BLD: 0.04 10*3/MM3 (ref 0–0.03)
IMM GRANULOCYTES NFR BLD: 0.4 % (ref 0–0.6)
KETONES UR QL: NEGATIVE
LEUKOCYTE EST, POC: ABNORMAL
LYMPHOCYTES # BLD AUTO: 1.98 10*3/MM3 (ref 0.6–4.8)
LYMPHOCYTES NFR BLD AUTO: 21.4 % (ref 24–44)
MCH RBC QN AUTO: 32.3 PG (ref 27–31)
MCHC RBC AUTO-ENTMCNC: 33.9 G/DL (ref 32–36)
MCV RBC AUTO: 95.2 FL (ref 80–99)
MONOCYTES # BLD AUTO: 0.57 10*3/MM3 (ref 0–1)
MONOCYTES NFR BLD AUTO: 6.1 % (ref 0–12)
NEUTROPHILS # BLD AUTO: 6.58 10*3/MM3 (ref 1.5–8.3)
NEUTROPHILS NFR BLD AUTO: 71 % (ref 41–71)
NITRITE UR-MCNC: NEGATIVE MG/ML
PH UR: 5 [PH] (ref 5–8)
PLATELET # BLD AUTO: 284 10*3/MM3 (ref 150–450)
PMV BLD AUTO: 10.7 FL (ref 6–12)
PROT UR STRIP-MCNC: NEGATIVE MG/DL
RBC # BLD AUTO: 4.58 10*6/MM3 (ref 3.89–5.14)
RBC # UR STRIP: NEGATIVE /UL
SP GR UR: 1.02 (ref 1–1.03)
TSH SERPL DL<=0.05 MIU/L-ACNC: 2.51 MIU/ML (ref 0.35–5.35)
UROBILINOGEN UR QL: NORMAL
WBC NRBC COR # BLD: 9.27 10*3/MM3 (ref 3.5–10.8)

## 2018-11-13 PROCEDURE — 99397 PER PM REEVAL EST PAT 65+ YR: CPT | Performed by: INTERNAL MEDICINE

## 2018-11-13 PROCEDURE — 90471 IMMUNIZATION ADMIN: CPT | Performed by: INTERNAL MEDICINE

## 2018-11-13 PROCEDURE — 84443 ASSAY THYROID STIM HORMONE: CPT | Performed by: INTERNAL MEDICINE

## 2018-11-13 PROCEDURE — 90472 IMMUNIZATION ADMIN EACH ADD: CPT | Performed by: INTERNAL MEDICINE

## 2018-11-13 PROCEDURE — 87086 URINE CULTURE/COLONY COUNT: CPT | Performed by: INTERNAL MEDICINE

## 2018-11-13 PROCEDURE — 85025 COMPLETE CBC W/AUTO DIFF WBC: CPT | Performed by: INTERNAL MEDICINE

## 2018-11-13 PROCEDURE — 81003 URINALYSIS AUTO W/O SCOPE: CPT | Performed by: INTERNAL MEDICINE

## 2018-11-13 PROCEDURE — 90662 IIV NO PRSV INCREASED AG IM: CPT | Performed by: INTERNAL MEDICINE

## 2018-11-13 PROCEDURE — 90670 PCV13 VACCINE IM: CPT | Performed by: INTERNAL MEDICINE

## 2018-11-13 NOTE — PROGRESS NOTES
"Chief Complaint   Patient presents with   • Annual Exam               Reported Health  Good Yes  FairNo  PoorNo      Dental,Vision,Hearing  Regular dental visitsYes  Vision ProblemsYes  Hearing LossNo      Immunization Status:  Up To DateNo        Lifestyle  Healthy DietYes  Weight ConcernsYes  Regular ExerciseNo  Tobacco UseNo  Alcohol UseYes  Drug AbuseNo      Screening  Cancer ScreeningYes  Metabolic ScreeningYes  Risk ScreeningYes  Past Medical History:   Diagnosis Date   • Breast cyst    • Diverticulosis    • Essential hypertriglyceridemia    • PONV (postoperative nausea and vomiting)    • Vitamin D deficiency     \"HAD BLOODWORK RECENTLY AND MY DOCTOR DIDN'T SAY ANYTHING ABOUT THIS AT THAT TIME\"     Past Surgical History:   Procedure Laterality Date   • BREAST BIOPSY     • BREAST CYST EXCISION      PT UNSURE OF LATERALITY   • COLONOSCOPY  UNKNOWN   • DIAGNOSTIC LAPAROSCOPY     • HYSTERECTOMY     • OOPHORECTOMY       Family History   Problem Relation Age of Onset   • Diabetes Other    • Heart disease Other    • Hypertension Other    • Heart attack Mother    • Diabetes Father    • Heart attack Father    • Breast cancer Neg Hx    • Ovarian cancer Neg Hx      Social History     Socioeconomic History   • Marital status:      Spouse name: Not on file   • Number of children: Not on file   • Years of education: Not on file   • Highest education level: Not on file   Social Needs   • Financial resource strain: Not on file   • Food insecurity - worry: Not on file   • Food insecurity - inability: Not on file   • Transportation needs - medical: Not on file   • Transportation needs - non-medical: Not on file   Occupational History   • Not on file   Tobacco Use   • Smoking status: Never Smoker   • Smokeless tobacco: Never Used   Substance and Sexual Activity   • Alcohol use: Yes     Alcohol/week: 2.4 oz     Types: 4 Glasses of wine per week   • Drug use: No   • Sexual activity: Defer   Other Topics Concern   • Not on " "file   Social History Narrative   • Not on file         Review of Systems   Constitutional: Negative for activity change, appetite change, chills, diaphoresis, fatigue, fever and unexpected weight change.   HENT: Negative for congestion, ear discharge, ear pain, mouth sores, nosebleeds, sinus pressure, sneezing and sore throat.    Eyes: Negative for pain, discharge and itching.   Respiratory: Negative for cough, chest tightness, shortness of breath and wheezing.    Cardiovascular: Negative for chest pain, palpitations and leg swelling.   Gastrointestinal: Negative for abdominal pain, constipation, diarrhea, nausea and vomiting.   Endocrine: Negative for cold intolerance, heat intolerance, polydipsia and polyphagia.   Genitourinary: Negative for dysuria, flank pain, frequency, hematuria and urgency.   Musculoskeletal: Negative for arthralgias, back pain, gait problem, myalgias, neck pain and neck stiffness.   Skin: Negative for color change, pallor and rash.   Neurological: Negative for seizures, speech difficulty, numbness and headaches.   Psychiatric/Behavioral: Negative for agitation, confusion, decreased concentration and sleep disturbance. The patient is not nervous/anxious.      /90   Pulse 76   Ht 167.6 cm (66\")   Wt 93.9 kg (207 lb)   SpO2 99%   BMI 33.41 kg/m²     Physical Exam   Constitutional: She is oriented to person, place, and time. She appears well-developed.   HENT:   Head: Normocephalic.   Right Ear: External ear normal.   Left Ear: External ear normal.   Nose: Nose normal.   Mouth/Throat: Oropharynx is clear and moist.   Eyes: Conjunctivae are normal. Pupils are equal, round, and reactive to light.   Neck: No JVD present. No thyromegaly present.   Cardiovascular: Normal rate, regular rhythm and normal heart sounds. Exam reveals no friction rub.   No murmur heard.  Pulmonary/Chest: Effort normal and breath sounds normal. No respiratory distress. She has no wheezes. She has no rales. "   Abdominal: Soft. Bowel sounds are normal. She exhibits no distension. There is no tenderness. There is no guarding.   Musculoskeletal: She exhibits no edema or tenderness.   Lymphadenopathy:     She has no cervical adenopathy.   Neurological: She is oriented to person, place, and time. She displays normal reflexes. No cranial nerve deficit.   Skin: No rash noted.   Psychiatric: Her behavior is normal.   Nursing note and vitals reviewed.                Diet and Exercise    Healthy Diet Yes  Adequate DietNo  Poor DietNo  Adequate Exercise RegimenNo  Inadequate Exercise RegimenYes      Cervical Cancer Screening  No longer does. [facility]    Breast Cancer screening  Risks and Benefits DiscussedYes  Self Breast Exam taughtNo  Monthly Self Exam AdvisedYes  Screening CurrentYes  Mammogram OrderedNo  Screening Not IndicatedNo  Screening Managed By GYNYes  Patient DeclinesNo      STD Testing  ChlamydiaNo  GonorrheaNo  HIVNo      Osteoporosis Screening  Risks And Benefits DiscussedYes  BMD CurrentYes  BMD orderedNo  Patient DeclinesNo    Colorectal Cancer Screening  Risks and Benefits DiscussedYes  Screening currentNo  FOBT Supplies givenNo  FOBT Every YearNo  Colonoscopy OrderedYes  Colonoscopy every 5 yearsNo  Colonoscopy every 10 yearsYes  Screening not indicatedNo  Patient declinesNo    Metabolic Screening  GlucoseYes  LipidsYes  CBCYes  TSHYes  UANo  CMPYes  25OHNo      Immunizations  Risks and benefits discussedYes  Immunizations Up To Dateno  Immunizations NeededYes  Immunizations Per OrdersYes  Patient DNoeclines      Preventative Counseling  NutritionYes  Aerobic ExerciseYes  Weight Bearing ExerciseYes  Weight LossYes  Calcium SupplementsYes  Vitamin D SupplementsYes  Reproductive HealthNo  Cardiovascular Risk ReductionYes  Tobacco CessationNo  Alcohol UseYes  Sunscreen UseYes  Self Skin ExaminationNo  Helmet UseNo  Seat Belt UseYes  Fall Risk ReductionNo  Advanced Directive PlanningNo      Patient  Discussion  PatientYes  FamilyNo  Lola  Samantha was seen today for annual exam.    Diagnoses and all orders for this visit:    Healthcare maintenance  -     CBC & Differential  -     TSH  -     CBC Auto Differential  -     POC Urinalysis Dipstick, Automated  -     pneumococcal conj. 13-valent (PREVNAR-13) vaccine 0.5 mL; Inject 0.5 mL into the appropriate muscle as directed by prescriber 1 (One) Time.    Other orders  -     Flu Vaccine High Dose PF 65YR+ ([id number])

## 2018-11-15 LAB — BACTERIA SPEC AEROBE CULT: NORMAL

## 2019-02-21 ENCOUNTER — APPOINTMENT (OUTPATIENT)
Dept: MAMMOGRAPHY | Facility: HOSPITAL | Age: 64
End: 2019-02-21

## 2019-05-23 ENCOUNTER — HOSPITAL ENCOUNTER (OUTPATIENT)
Dept: MAMMOGRAPHY | Facility: HOSPITAL | Age: 64
Discharge: HOME | End: 2019-05-23
Admitting: RADIOLOGY

## 2019-05-23 DIAGNOSIS — R92.8 ABNORMAL MAMMOGRAM: ICD-10-CM

## 2019-05-23 PROCEDURE — 77066 DX MAMMO INCL CAD BI: CPT | Performed by: RADIOLOGY

## 2019-05-23 PROCEDURE — 77066 DX MAMMO INCL CAD BI: CPT

## 2019-05-23 PROCEDURE — 77062 BREAST TOMOSYNTHESIS BI: CPT | Performed by: RADIOLOGY

## 2019-05-23 PROCEDURE — G0279 TOMOSYNTHESIS, MAMMO: HCPCS

## 2019-05-24 ENCOUNTER — TELEPHONE (OUTPATIENT)
Dept: INTERNAL MEDICINE | Facility: CLINIC | Age: 64
End: 2019-05-24

## 2019-05-24 NOTE — TELEPHONE ENCOUNTER
Pt notified of repeat mammo in 1 year recommendations from the breast center. Pt states that she tried to go ahead and have that scheduled but they told her they could not schedule 1 year out.

## 2019-05-24 NOTE — TELEPHONE ENCOUNTER
----- Message from Julia Figueroa DO sent at 5/24/2019  8:07 AM EDT -----  Make sure breast center has nolan her for year f/u diagnostic mammogram

## 2019-11-21 ENCOUNTER — OFFICE VISIT (OUTPATIENT)
Dept: INTERNAL MEDICINE | Facility: CLINIC | Age: 64
End: 2019-11-21

## 2019-11-21 VITALS
HEIGHT: 66 IN | SYSTOLIC BLOOD PRESSURE: 130 MMHG | WEIGHT: 211.4 LBS | DIASTOLIC BLOOD PRESSURE: 84 MMHG | OXYGEN SATURATION: 99 % | HEART RATE: 95 BPM | BODY MASS INDEX: 33.97 KG/M2

## 2019-11-21 DIAGNOSIS — K57.90 DIVERTICULOSIS: ICD-10-CM

## 2019-11-21 DIAGNOSIS — Z00.00 MEDICARE ANNUAL WELLNESS VISIT, SUBSEQUENT: ICD-10-CM

## 2019-11-21 DIAGNOSIS — R53.83 OTHER FATIGUE: ICD-10-CM

## 2019-11-21 DIAGNOSIS — E78.5 HYPERLIPIDEMIA LDL GOAL <100: ICD-10-CM

## 2019-11-21 DIAGNOSIS — Z78.0 POSTMENOPAUSAL: ICD-10-CM

## 2019-11-21 DIAGNOSIS — E55.9 VITAMIN D DEFICIENCY: ICD-10-CM

## 2019-11-21 DIAGNOSIS — Z23 NEED FOR VACCINATION: Primary | ICD-10-CM

## 2019-11-21 DIAGNOSIS — Z12.11 COLON CANCER SCREENING: ICD-10-CM

## 2019-11-21 DIAGNOSIS — Z23 NEEDS FLU SHOT: ICD-10-CM

## 2019-11-21 PROCEDURE — 90732 PPSV23 VACC 2 YRS+ SUBQ/IM: CPT | Performed by: INTERNAL MEDICINE

## 2019-11-21 PROCEDURE — 96160 PT-FOCUSED HLTH RISK ASSMT: CPT | Performed by: INTERNAL MEDICINE

## 2019-11-21 PROCEDURE — 90653 IIV ADJUVANT VACCINE IM: CPT | Performed by: INTERNAL MEDICINE

## 2019-11-21 PROCEDURE — G0439 PPPS, SUBSEQ VISIT: HCPCS | Performed by: INTERNAL MEDICINE

## 2019-11-21 PROCEDURE — G0008 ADMIN INFLUENZA VIRUS VAC: HCPCS | Performed by: INTERNAL MEDICINE

## 2019-11-21 PROCEDURE — G0009 ADMIN PNEUMOCOCCAL VACCINE: HCPCS | Performed by: INTERNAL MEDICINE

## 2019-11-21 NOTE — PROGRESS NOTES
The ABCs of the Annual Wellness Visit  Subsequent Medicare Wellness Visit    Chief Complaint   Patient presents with   • Medicare Wellness-Initial Visit       Subjective   History of Present Illness:  Samantha Gaines is a 64 y.o. female who presents for a Subsequent Medicare Wellness Visit.    HEALTH RISK ASSESSMENT    Recent Hospitalizations:  No hospitalization(s) within the last year.    Current Medical Providers:  Patient Care Team:  Julia Figueroa DO as PCP - General  Julia Figueroa DO as PCP - Family Medicine    Smoking Status:  Social History     Tobacco Use   Smoking Status Never Smoker   Smokeless Tobacco Never Used       Alcohol Consumption:  Social History     Substance and Sexual Activity   Alcohol Use Yes   • Alcohol/week: 2.4 oz   • Types: 4 Glasses of wine per week       Depression Screen:   PHQ-2/PHQ-9 Depression Screening 11/21/2019   Little interest or pleasure in doing things 0   Feeling down, depressed, or hopeless 0   Total Score 0       Fall Risk Screen:  ARINA Fall Risk Assessment was completed, and patient is at LOW risk for falls.Assessment completed on:11/21/2019    Health Habits and Functional and Cognitive Screening:  Functional & Cognitive Status 11/21/2019   Do you have difficulty preparing food and eating? No   Do you have difficulty bathing yourself, getting dressed or grooming yourself? No   Do you have difficulty using the toilet? No   Do you have difficulty moving around from place to place? No   Do you have trouble with steps or getting out of a bed or a chair? No   Current Diet Well Balanced Diet   Dental Exam Up to date   Eye Exam Up to date   Exercise (times per week) 5 times per week   Current Exercise Activities Include Walking   Do you need help using the phone?  No   Are you deaf or do you have serious difficulty hearing?  No   Do you need help with transportation? No   Do you need help shopping? No   Do you need help preparing meals?  No   Do you need help with  housework?  No   Do you need help with laundry? No   Do you need help taking your medications? No   Do you need help managing money? No   Do you ever drive or ride in a car without wearing a seat belt? No   Have you felt unusual stress, anger or loneliness in the last month? No   Who do you live with? Spouse   If you need help, do you have trouble finding someone available to you? No   Have you been bothered in the last four weeks by sexual problems? No         Does the patient have evidence of cognitive impairment? No    Asprin use counseling:Does not need ASA (and currently is not on it)    Age-appropriate Screening Schedule:  Refer to the list below for future screening recommendations based on patient's age, sex and/or medical conditions. Orders for these recommended tests are listed in the plan section. The patient has been provided with a written plan.    Health Maintenance   Topic Date Due   • TDAP/TD VACCINES (1 - Tdap) 04/16/1972   • ZOSTER VACCINE (2 of 2) 03/31/2014   • INFLUENZA VACCINE  08/01/2019   • LIPID PANEL  09/21/2019   • PNEUMOCOCCAL VACCINES (65+ LOW/MEDIUM RISK) (2 of 2 - PPSV23) 11/13/2019   • COLONOSCOPY  05/17/2020   • MAMMOGRAM  05/23/2021          The following portions of the patient's history were reviewed and updated as appropriate: allergies, current medications, past family history, past medical history, past social history, past surgical history and problem list.    No outpatient medications prior to visit.     No facility-administered medications prior to visit.        Patient Active Problem List   Diagnosis   • Hyperlipidemia LDL goal <100   • Vitamin D deficiency   • Diverticulosis       Advanced Care Planning:  Patient has an advance directive - a copy has not been provided. Have asked the patient to send this to us to add to record    Review of Systems   Constitutional: Negative for activity change, appetite change, chills, diaphoresis, fatigue, fever and unexpected weight  "change.   HENT: Negative for congestion, ear discharge, ear pain, mouth sores, nosebleeds, sinus pressure, sneezing and sore throat.    Eyes: Negative for pain, discharge and itching.   Respiratory: Negative for cough, chest tightness, shortness of breath and wheezing.    Cardiovascular: Negative for chest pain, palpitations and leg swelling.   Gastrointestinal: Negative for abdominal pain, constipation, diarrhea, nausea and vomiting.   Endocrine: Negative for cold intolerance, heat intolerance, polydipsia and polyphagia.   Genitourinary: Negative for dysuria, flank pain, frequency, hematuria and urgency.   Musculoskeletal: Negative for arthralgias, back pain, gait problem, myalgias, neck pain and neck stiffness.   Skin: Negative for color change, pallor and rash.   Neurological: Negative for seizures, speech difficulty, numbness and headaches.   Psychiatric/Behavioral: Negative for agitation, confusion, decreased concentration and sleep disturbance. The patient is not nervous/anxious.        Compared to one year ago, the patient feels her physical health is the same.  Compared to one year ago, the patient feels her mental health is the same.    Reviewed chart for potential of high risk medication in the elderly: yes  Reviewed chart for potential of harmful drug interactions in the elderly:yes    Objective         Vitals:    11/21/19 0957   BP: 130/84   Pulse: 95   SpO2: 99%   Weight: 95.9 kg (211 lb 6.4 oz)   Height: 167.6 cm (66\")   PainSc: 0-No pain       Body mass index is 34.12 kg/m².  Discussed the patient's BMI with her. The BMI is above average; BMI management plan is completed.    Physical Exam   Constitutional: She is oriented to person, place, and time. She appears well-developed.   HENT:   Head: Normocephalic.   Right Ear: External ear normal.   Left Ear: External ear normal.   Nose: Nose normal.   Mouth/Throat: Oropharynx is clear and moist.   Eyes: Conjunctivae are normal. Pupils are equal, round, and " reactive to light.   Neck: No JVD present. No thyromegaly present.   Cardiovascular: Normal rate, regular rhythm and normal heart sounds. Exam reveals no friction rub.   No murmur heard.  Pulmonary/Chest: Effort normal and breath sounds normal. No respiratory distress. She has no wheezes. She has no rales.   Abdominal: Soft. Bowel sounds are normal. She exhibits no distension. There is no tenderness. There is no guarding.   Musculoskeletal: She exhibits no edema or tenderness.   Lymphadenopathy:     She has no cervical adenopathy.   Neurological: She is alert and oriented to person, place, and time. She displays normal reflexes. No cranial nerve deficit.   Skin: No rash noted.   Psychiatric: Her behavior is normal.   Nursing note and vitals reviewed.            Assessment/Plan   Medicare Risks and Personalized Health Plan  CMS Preventative Services Quick Reference  Obesity/Overweight     The above risks/problems have been discussed with the patient.  Pertinent information has been shared with the patient in the After Visit Summary.  Follow up plans and orders are seen below in the Assessment/Plan Section.    Diagnoses and all orders for this visit:    1. Medicare annual wellness visit, subsequent  Done today  2. Needs flu shot  -     Fluad Quad >65 years ([id number])    3. Hyperlipidemia LDL goal <100  -     Comprehensive Metabolic Panel  -     Lipid Panel    4. Vitamin D deficiency  -     Vitamin D 25 Hydroxy    5. Diverticulosis    6. Other fatigue  -     CBC & Differential  -     TSH    7. Postmenopausal  -     DEXA Bone Density Axial; Future    8. Colon cancer screening  -     Ambulatory Referral For Screening Colonoscopy      Follow Up:  No Follow-up on file.     An After Visit Summary and PPPS were given to the patient.

## 2020-03-12 ENCOUNTER — HOSPITAL ENCOUNTER (OUTPATIENT)
Dept: BONE DENSITY | Facility: HOSPITAL | Age: 65
Discharge: HOME | End: 2020-03-12
Admitting: INTERNAL MEDICINE

## 2020-03-12 DIAGNOSIS — Z78.0 POSTMENOPAUSAL: ICD-10-CM

## 2020-03-12 PROCEDURE — 77080 DXA BONE DENSITY AXIAL: CPT

## 2020-12-10 ENCOUNTER — LAB REQUISITION (OUTPATIENT)
Dept: LAB | Facility: HOSPITAL | Age: 65
End: 2020-12-10

## 2020-12-10 ENCOUNTER — OFFICE VISIT (OUTPATIENT)
Dept: INTERNAL MEDICINE | Facility: CLINIC | Age: 65
End: 2020-12-10

## 2020-12-10 VITALS
BODY MASS INDEX: 29.89 KG/M2 | OXYGEN SATURATION: 98 % | SYSTOLIC BLOOD PRESSURE: 138 MMHG | DIASTOLIC BLOOD PRESSURE: 80 MMHG | WEIGHT: 186 LBS | TEMPERATURE: 97 F | HEIGHT: 66 IN | HEART RATE: 71 BPM

## 2020-12-10 DIAGNOSIS — E78.5 HYPERLIPIDEMIA, UNSPECIFIED: ICD-10-CM

## 2020-12-10 DIAGNOSIS — D12.6 TUBULAR ADENOMA OF COLON: ICD-10-CM

## 2020-12-10 DIAGNOSIS — K57.90 DIVERTICULOSIS: ICD-10-CM

## 2020-12-10 DIAGNOSIS — E78.5 HYPERLIPIDEMIA LDL GOAL <100: ICD-10-CM

## 2020-12-10 DIAGNOSIS — E55.9 VITAMIN D DEFICIENCY: ICD-10-CM

## 2020-12-10 DIAGNOSIS — Z00.00 ROUTINE GENERAL MEDICAL EXAMINATION AT A HEALTH CARE FACILITY: ICD-10-CM

## 2020-12-10 DIAGNOSIS — K57.90 DIVERTICULOSIS OF INTESTINE, PART UNSPECIFIED, WITHOUT PERFORATION OR ABSCESS WITHOUT BLEEDING: ICD-10-CM

## 2020-12-10 DIAGNOSIS — Z00.00 MEDICARE ANNUAL WELLNESS VISIT, SUBSEQUENT: Primary | ICD-10-CM

## 2020-12-10 DIAGNOSIS — E55.9 VITAMIN D DEFICIENCY, UNSPECIFIED: ICD-10-CM

## 2020-12-10 PROCEDURE — G0439 PPPS, SUBSEQ VISIT: HCPCS | Performed by: INTERNAL MEDICINE

## 2020-12-10 PROCEDURE — 1126F AMNT PAIN NOTED NONE PRSNT: CPT | Performed by: INTERNAL MEDICINE

## 2020-12-10 PROCEDURE — 1170F FXNL STATUS ASSESSED: CPT | Performed by: INTERNAL MEDICINE

## 2020-12-10 PROCEDURE — 96160 PT-FOCUSED HLTH RISK ASSMT: CPT | Performed by: INTERNAL MEDICINE

## 2020-12-10 PROCEDURE — 1159F MED LIST DOCD IN RCRD: CPT | Performed by: INTERNAL MEDICINE

## 2020-12-10 NOTE — PROGRESS NOTES
The ABCs of the Annual Wellness Visit  Subsequent Medicare Wellness Visit    Chief Complaint   Patient presents with   • Medicare Wellness-subsequent       Subjective   History of Present Illness:  Samantha Gaines is a 65 y.o. female who presents for a Subsequent Medicare Wellness Visit.    HEALTH RISK ASSESSMENT    Recent Hospitalizations:  No hospitalization(s) within the last year.    Current Medical Providers:  Patient Care Team:  Julia Figueroa DO as PCP - General  Julia Figueroa DO as PCP - Family Medicine    Smoking Status:  Social History     Tobacco Use   Smoking Status Never Smoker   Smokeless Tobacco Never Used       Alcohol Consumption:  Social History     Substance and Sexual Activity   Alcohol Use Yes   • Alcohol/week: 4.0 standard drinks   • Types: 4 Glasses of wine per week       Depression Screen:   PHQ-2/PHQ-9 Depression Screening 12/10/2020   Little interest or pleasure in doing things 0   Feeling down, depressed, or hopeless 0   Total Score 0       Fall Risk Screen:  ARINA Fall Risk Assessment was completed, and patient is at LOW risk for falls.Assessment completed on:12/10/2020    Health Habits and Functional and Cognitive Screening:  Functional & Cognitive Status 12/10/2020   Do you have difficulty preparing food and eating? No   Do you have difficulty bathing yourself, getting dressed or grooming yourself? No   Do you have difficulty using the toilet? No   Do you have difficulty moving around from place to place? No   Do you have trouble with steps or getting out of a bed or a chair? No   Current Diet Well Balanced Diet   Dental Exam Up to date   Eye Exam Up to date   Exercise (times per week) 5 times per week   Current Exercise Activities Include Walking   Do you need help using the phone?  No   Are you deaf or do you have serious difficulty hearing?  No   Do you need help with transportation? No   Do you need help shopping? No   Do you need help preparing meals?  No   Do you need  help with housework?  No   Do you need help with laundry? No   Do you need help taking your medications? No   Do you need help managing money? No   Do you ever drive or ride in a car without wearing a seat belt? No   Have you felt unusual stress, anger or loneliness in the last month? No   Who do you live with? Spouse   If you need help, do you have trouble finding someone available to you? No   Have you been bothered in the last four weeks by sexual problems? No   Do you have difficulty concentrating, remembering or making decisions? No         Does the patient have evidence of cognitive impairment? No    Asprin use counseling:Does not need ASA (and currently is not on it)    Age-appropriate Screening Schedule:  Refer to the list below for future screening recommendations based on patient's age, sex and/or medical conditions. Orders for these recommended tests are listed in the plan section. The patient has been provided with a written plan.    Health Maintenance   Topic Date Due   • TDAP/TD VACCINES (1 - Tdap) 04/16/1972   • ZOSTER VACCINE (2 of 2) 03/31/2014   • MAMMOGRAM  05/23/2021   • LIPID PANEL  12/10/2021   • COLONOSCOPY  09/21/2030   • INFLUENZA VACCINE  Completed   • PAP SMEAR  Discontinued          The following portions of the patient's history were reviewed and updated as appropriate: allergies, current medications, past family history, past medical history, past social history, past surgical history and problem list.    Outpatient Medications Prior to Visit   Medication Sig Dispense Refill   • benzonatate (TESSALON) 200 MG capsule Take 1 capsule by mouth 3 (Three) Times a Day As Needed for Cough. 30 capsule 0   • clarithromycin (BIAXIN) 500 MG tablet Take 1 tablet by mouth Every 12 (Twelve) Hours. 20 tablet 0   • methylPREDNISolone (MEDROL, WAQAR,) 4 MG tablet Take as directed on package instructions. 21 tablet 0     No facility-administered medications prior to visit.        Patient Active Problem List  "  Diagnosis   • Hyperlipidemia LDL goal <100   • Vitamin D deficiency   • Diverticulosis   • Tubular adenoma of colon       Advanced Care Planning:  ACP discussion was held with the patient during this visit. Patient has an advance directive in EMR which is still valid.     Review of Systems   Constitutional: Negative for activity change, appetite change, chills, diaphoresis, fatigue, fever and unexpected weight change.   HENT: Negative for congestion, ear discharge, ear pain, mouth sores, nosebleeds, sinus pressure, sneezing and sore throat.    Eyes: Negative for pain, discharge and itching.   Respiratory: Negative for cough, chest tightness, shortness of breath and wheezing.    Cardiovascular: Negative for chest pain, palpitations and leg swelling.   Gastrointestinal: Negative for abdominal pain, constipation, diarrhea, nausea and vomiting.   Endocrine: Negative for cold intolerance, heat intolerance, polydipsia and polyphagia.   Genitourinary: Negative for dysuria, flank pain, frequency, hematuria and urgency.   Musculoskeletal: Negative for arthralgias, back pain, gait problem, myalgias, neck pain and neck stiffness.   Skin: Negative for color change, pallor and rash.   Neurological: Negative for seizures, speech difficulty, numbness and headaches.   Psychiatric/Behavioral: Negative for agitation, confusion, decreased concentration and sleep disturbance. The patient is not nervous/anxious.        Compared to one year ago, the patient feels her physical health is the same.  Compared to one year ago, the patient feels her mental health is the same.    Reviewed chart for potential of high risk medication in the elderly: yes  Reviewed chart for potential of harmful drug interactions in the elderly:yes    Objective         Vitals:    12/10/20 0900   BP: 138/80   Pulse: 71   Temp: 97 °F (36.1 °C)   SpO2: 98%   Weight: 84.4 kg (186 lb)   Height: 167.6 cm (66\")   PainSc: 0-No pain       Body mass index is 30.02 " kg/m².  Discussed the patient's BMI with her. The BMI is above average; BMI management plan is completed.    Physical Exam  Vitals signs and nursing note reviewed.   Constitutional:       Appearance: Normal appearance. She is well-developed.   HENT:      Head: Normocephalic.      Right Ear: Tympanic membrane, ear canal and external ear normal.      Left Ear: Tympanic membrane, ear canal and external ear normal.      Nose: Nose normal.      Mouth/Throat:      Pharynx: No oropharyngeal exudate or posterior oropharyngeal erythema.   Eyes:      Conjunctiva/sclera: Conjunctivae normal.      Pupils: Pupils are equal, round, and reactive to light.   Neck:      Thyroid: No thyromegaly.      Vascular: No JVD.   Cardiovascular:      Rate and Rhythm: Normal rate and regular rhythm.      Heart sounds: Normal heart sounds. No murmur. No friction rub.   Pulmonary:      Effort: No respiratory distress.      Breath sounds: No wheezing or rales.   Abdominal:      General: Bowel sounds are normal. There is no distension.      Palpations: Abdomen is soft.      Tenderness: There is no abdominal tenderness.   Musculoskeletal:         General: No tenderness.   Lymphadenopathy:      Cervical: No cervical adenopathy.   Skin:     Findings: No rash.   Neurological:      General: No focal deficit present.      Mental Status: She is alert and oriented to person, place, and time.      Cranial Nerves: No cranial nerve deficit.      Deep Tendon Reflexes: Reflexes normal.   Psychiatric:         Mood and Affect: Mood normal.         Behavior: Behavior normal.               Assessment/Plan   Medicare Risks and Personalized Health Plan  CMS Preventative Services Quick Reference  Obesity/Overweight     The above risks/problems have been discussed with the patient.  Pertinent information has been shared with the patient in the After Visit Summary.  Follow up plans and orders are seen below in the Assessment/Plan Section.    Diagnoses and all orders for  this visit:    1. Medicare annual wellness visit, subsequent (Primary)  -     CBC & Differential; Future    2. Hyperlipidemia LDL goal <100  -     Comprehensive Metabolic Panel; Future  -     Lipid Panel; Future    3. Diverticulosis  -     CBC & Differential; Future    4. Vitamin D deficiency  -     Vitamin D 25 Hydroxy; Future    5. Tubular adenoma of colon  Colonoscopy  9/2020 and they rec repeat in 7 years    Follow Up:  Return in about 1 year (around 12/10/2021) for Medicare Wellness.     An After Visit Summary and PPPS were given to the patient.

## 2020-12-11 LAB
25(OH)D3+25(OH)D2 SERPL-MCNC: 57.2 NG/ML (ref 30–100)
ALBUMIN SERPL-MCNC: 4.5 G/DL (ref 3.5–5.2)
ALBUMIN/GLOB SERPL: 2.1 G/DL
ALP SERPL-CCNC: 68 U/L (ref 39–117)
ALT SERPL-CCNC: 17 U/L (ref 1–33)
AST SERPL-CCNC: 18 U/L (ref 1–32)
BASOPHILS # BLD AUTO: 0.06 10*3/MM3 (ref 0–0.2)
BASOPHILS NFR BLD AUTO: 0.9 % (ref 0–1.5)
BILIRUB SERPL-MCNC: 0.7 MG/DL (ref 0–1.2)
BUN SERPL-MCNC: 17 MG/DL (ref 8–23)
BUN/CREAT SERPL: 22.1 (ref 7–25)
CALCIUM SERPL-MCNC: 9.4 MG/DL (ref 8.6–10.5)
CHLORIDE SERPL-SCNC: 106 MMOL/L (ref 98–107)
CHOLEST SERPL-MCNC: 264 MG/DL (ref 0–200)
CO2 SERPL-SCNC: 27.1 MMOL/L (ref 22–29)
CREAT SERPL-MCNC: 0.77 MG/DL (ref 0.57–1)
EOSINOPHIL # BLD AUTO: 0.1 10*3/MM3 (ref 0–0.4)
EOSINOPHIL NFR BLD AUTO: 1.6 % (ref 0.3–6.2)
ERYTHROCYTE [DISTWIDTH] IN BLOOD BY AUTOMATED COUNT: 12.5 % (ref 12.3–15.4)
GLOBULIN SER CALC-MCNC: 2.1 GM/DL
GLUCOSE SERPL-MCNC: 93 MG/DL (ref 65–99)
HCT VFR BLD AUTO: 44.5 % (ref 34–46.6)
HDLC SERPL-MCNC: 64 MG/DL (ref 40–60)
HGB BLD-MCNC: 15.1 G/DL (ref 12–15.9)
IMM GRANULOCYTES # BLD AUTO: 0.02 10*3/MM3 (ref 0–0.05)
IMM GRANULOCYTES NFR BLD AUTO: 0.3 % (ref 0–0.5)
LDLC SERPL CALC-MCNC: 167 MG/DL (ref 0–100)
LYMPHOCYTES # BLD AUTO: 1.64 10*3/MM3 (ref 0.7–3.1)
LYMPHOCYTES NFR BLD AUTO: 25.5 % (ref 19.6–45.3)
MCH RBC QN AUTO: 33.3 PG (ref 26.6–33)
MCHC RBC AUTO-ENTMCNC: 33.9 G/DL (ref 31.5–35.7)
MCV RBC AUTO: 98.2 FL (ref 79–97)
MONOCYTES # BLD AUTO: 0.47 10*3/MM3 (ref 0.1–0.9)
MONOCYTES NFR BLD AUTO: 7.3 % (ref 5–12)
NEUTROPHILS # BLD AUTO: 4.15 10*3/MM3 (ref 1.7–7)
NEUTROPHILS NFR BLD AUTO: 64.4 % (ref 42.7–76)
NRBC BLD AUTO-RTO: 0 /100 WBC (ref 0–0.2)
PLATELET # BLD AUTO: 292 10*3/MM3 (ref 140–450)
POTASSIUM SERPL-SCNC: 4.6 MMOL/L (ref 3.5–5.2)
PROT SERPL-MCNC: 6.6 G/DL (ref 6–8.5)
RBC # BLD AUTO: 4.53 10*6/MM3 (ref 3.77–5.28)
SODIUM SERPL-SCNC: 141 MMOL/L (ref 136–145)
TRIGL SERPL-MCNC: 184 MG/DL (ref 0–150)
VLDLC SERPL CALC-MCNC: 33 MG/DL (ref 5–40)
WBC # BLD AUTO: 6.44 10*3/MM3 (ref 3.4–10.8)

## 2021-04-30 ENCOUNTER — OFFICE VISIT (OUTPATIENT)
Dept: ORTHOPEDIC SURGERY | Facility: CLINIC | Age: 66
End: 2021-04-30

## 2021-04-30 VITALS
DIASTOLIC BLOOD PRESSURE: 80 MMHG | HEIGHT: 66 IN | BODY MASS INDEX: 28.8 KG/M2 | SYSTOLIC BLOOD PRESSURE: 156 MMHG | HEART RATE: 64 BPM | WEIGHT: 179.2 LBS

## 2021-04-30 DIAGNOSIS — G62.9 NEUROPATHY: ICD-10-CM

## 2021-04-30 DIAGNOSIS — S82.831A OTHER CLOSED FRACTURE OF DISTAL END OF RIGHT FIBULA, INITIAL ENCOUNTER: ICD-10-CM

## 2021-04-30 DIAGNOSIS — M25.571 RIGHT ANKLE PAIN, UNSPECIFIED CHRONICITY: Primary | ICD-10-CM

## 2021-04-30 PROCEDURE — 99204 OFFICE O/P NEW MOD 45 MIN: CPT | Performed by: ORTHOPAEDIC SURGERY

## 2021-04-30 NOTE — PROGRESS NOTES
"NEW PATIENT    Patient: Samantha Gaines  : 1955    Primary Care Provider: Julia Figueroa DO    Requesting Provider: As above    Pain of the Right Ankle      History    Chief Complaint: Right ankle pain    History of Present Illness: This is an extremely pleasant 66-year-old woman who on [place], 2020, fell and inverted her right ankle.  She had pain and swelling, but she did not seek any treatment.  She stayed off it for about 3 weeks, and then \"limped around\" she is still having pain and swelling.  Her daughter ironically broke her ankle and had surgery in February and she has been taking care of her daughter.    No current outpatient medications on file prior to visit.     No current facility-administered medications on file prior to visit.      Allergies   Allergen Reactions   • Penicillins Hives      Past Medical History:   Diagnosis Date   • Breast cyst    • Diverticulitis of colon 2016   • Diverticulosis    • Essential hypertriglyceridemia    • Incisional hernia 2016   • PONV (postoperative nausea and vomiting)    • Tubular adenoma of colon 12/10/2020   • Vitamin D deficiency     \"HAD BLOODWORK RECENTLY AND MY DOCTOR DIDN'T SAY ANYTHING ABOUT THIS AT THAT TIME\"     Past Surgical History:   Procedure Laterality Date   • BREAST BIOPSY     • BREAST CYST EXCISION      PT UNSURE OF LATERALITY   • COLONOSCOPY  UNKNOWN   • DIAGNOSTIC LAPAROSCOPY     • HYSTERECTOMY     • OOPHORECTOMY     • VENTRAL/INCISIONAL HERNIA REPAIR N/A 2016    Procedure: OPEN INCISIONAL HERNIA REPAIR WITH MESH;  Surgeon: Maximino Sparks MD;  Location: [facility];  Service:      Family History   Problem Relation Age of Onset   • Diabetes Other    • Heart disease Other    • Hypertension Other    • Heart attack Mother    • Diabetes Father    • Heart attack Father    • Breast cancer Neg Hx    • Ovarian cancer Neg Hx       Social History     Socioeconomic History   • Marital status:      Spouse name: " "Not on file   • Number of children: Not on file   • Years of education: Not on file   • Highest education level: Not on file   Tobacco Use   • Smoking status: Never Smoker   • Smokeless tobacco: Never Used   Substance and Sexual Activity   • Alcohol use: Yes     Alcohol/week: 4.0 standard drinks     Types: 4 Glasses of wine per week   • Drug use: No   • Sexual activity: Defer        Review of Systems   Constitutional: Negative.    HENT: Negative.    Eyes: Negative.    Respiratory: Negative.    Cardiovascular: Negative.    Gastrointestinal: Negative.    Endocrine: Negative.    Genitourinary: Negative.    Musculoskeletal: Positive for arthralgias and joint swelling.   Skin: Negative.    Allergic/Immunologic: Negative.    Neurological: Negative.    Hematological: Negative.    Psychiatric/Behavioral: Negative.        The following portions of the patient's history were reviewed and updated as appropriate: allergies, current medications, past family history, past medical history, past social history, past surgical history and problem list.    Physical Exam:   /80   Pulse 64   Ht 167.6 cm (65.98\")   Wt 81.3 kg (179 lb 3.2 oz)   BMI 28.94 kg/m²   GENERAL: Body habitus: overweight    Lower extremity edema: Right: trace; Left: none    Varicose veins:  Right: none; Left: none    Gait: antalgic     Mental Status:  awake and alert; oriented to person, place, and time    Voice:  clear  SKIN:  Lower extremity: Normal and warm and dry    Hair Growth(lower extremity):  Right:normal; Left:  normal  NAILS: Toenails: normal  HEENT: Head: Normocephalic, atraumatic,  without obvious abnormality.  eye: normal external eye, no icterus  ears:normal external ears  PULM:  Repiratory effort normal  CV:  Dorsalis Pedis:  Right: 2+; Left:2+    Posterior Tibial: Right:2+; Left:2+    Capillary Refill:  Brisk  MSK:  Hand:mild arthritis, Heberden's nodes      Tibia:  Right:  non tender; Left:  non tender      Ankle:  Right: Mildly swollen " over the lateral fibula, tender to palpation over the fracture which is slightly above the level of the joint, nontender over the medial malleolus nontender over the remaining ankle joint, range of motion lacks only a few degrees compared to the left side, the Achilles is intact, no other tenderness; Left:  non tender, ROM  normal and motor function  normal      Foot:  Right:  non tender, ROM  normal and motor function  normal; Left:  non tender, ROM  normal and motor function  normal      NEURO: Heel Walking:  Right:  painful; Left:  normal    Toe Walking:  Right:  limited ability, painful; Left:  normal     [place]-Aarti 5.07 monofilament test: patchy decrease    Lower extremity sensation: diminished     Reflexes:  Biceps:  Right:  not tested; Left:  not tested           Quads:  Right:  not tested; Left:  not tested           Ankle:  Right:  not tested; Left:  not tested      Calf Atrophy:Mild right calf atrophy    Motor Function: all 5/5         Medical Decision Making    Data Review:   ordered and reviewed x-rays today    Assessment and Plan/ Diagnosis/Treatment options:   1. Other closed fracture of distal end of right fibula, initial encounter  She has a nondisplaced distal fibula fracture, Zee B, it does not appear to be healed radiographically and clinically she is quite tender to palpation.  I do not think it is healed.  I recommend a tall boot and a CT scan to evaluate the fracture for healing.  I explained why a CT scan would be better for this than an MRI.  I also have some concern that she has an underlying neuropathy, that might have been what allowed her to delay seeking treatment for this long.  I recommend she discuss this with Dr. Figueroa for consideration of work-up.  I explained that a common causes of neuropathy is diabetes.  Other causes include B12 deficiency, alcohol use, and then there are many patients with idiopathic neuropathy.  - CT Lower Extremity Right Without Contrast;  Future    2. Neuropathy  As above              Radiology Ordered []  Radiology Reports Reviewed []      Radiology Images Reviewed []   Labs Reviewed []    Labs Ordered []   PCP Records Reviewed []    Provider Records Reviewed []    ER Records Reviewed []    Hospital Records Reviewed []    History Obtained From Family []    Phone conversation with Provider []    Records Requested []        Sydney Garza MD

## 2021-05-04 ENCOUNTER — TRANSCRIBE ORDERS (OUTPATIENT)
Dept: ADMINISTRATIVE | Facility: HOSPITAL | Age: 66
End: 2021-05-04

## 2021-05-04 ENCOUNTER — OFFICE VISIT (OUTPATIENT)
Dept: INTERNAL MEDICINE | Facility: CLINIC | Age: 66
End: 2021-05-04

## 2021-05-04 ENCOUNTER — LAB (OUTPATIENT)
Dept: LAB | Facility: HOSPITAL | Age: 66
End: 2021-05-04

## 2021-05-04 VITALS
DIASTOLIC BLOOD PRESSURE: 78 MMHG | BODY MASS INDEX: 28.93 KG/M2 | HEART RATE: 65 BPM | OXYGEN SATURATION: 99 % | TEMPERATURE: 97.8 F | HEIGHT: 66 IN | WEIGHT: 180 LBS | SYSTOLIC BLOOD PRESSURE: 126 MMHG

## 2021-05-04 DIAGNOSIS — S82.831D CLOSED FRACTURE OF DISTAL END OF RIGHT FIBULA WITH ROUTINE HEALING, UNSPECIFIED FRACTURE MORPHOLOGY, SUBSEQUENT ENCOUNTER: ICD-10-CM

## 2021-05-04 DIAGNOSIS — R20.0 NUMBNESS: ICD-10-CM

## 2021-05-04 DIAGNOSIS — Z13.29 SCREENING FOR THYROID DISORDER: ICD-10-CM

## 2021-05-04 DIAGNOSIS — R73.9 HYPERGLYCEMIA: ICD-10-CM

## 2021-05-04 DIAGNOSIS — Z12.31 VISIT FOR SCREENING MAMMOGRAM: Primary | ICD-10-CM

## 2021-05-04 PROCEDURE — 99213 OFFICE O/P EST LOW 20 MIN: CPT | Performed by: INTERNAL MEDICINE

## 2021-05-04 NOTE — PROGRESS NOTES
"Subjective   Samantha Gaines is a 66 y.o. female.      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 Walked around on it thinking it was just a sprain.  Saw ortho 4/30/21.  Distal radial fracture and in a boot.  Concern for possible neuropathy since she walked around on it for this long. occasional numbness.  No hx diabetes.   The following portions of the patient's history were reviewed and updated as appropriate: allergies, current medications, past family history, past medical history, past social history, past surgical history and problem list.  Past Medical History:   Diagnosis Date   • Breast cyst    • Diverticulitis of colon 9/6/2016   • Diverticulosis    • Essential hypertriglyceridemia    • Incisional hernia 12/27/2016   • PONV (postoperative nausea and vomiting)    • Tubular adenoma of colon 12/10/2020   • Vitamin D deficiency     \"HAD BLOODWORK RECENTLY AND MY DOCTOR DIDN'T SAY ANYTHING ABOUT THIS AT THAT TIME\"     Past Surgical History:   Procedure Laterality Date   • BREAST BIOPSY     • BREAST CYST EXCISION      PT UNSURE OF LATERALITY   • COLONOSCOPY  UNKNOWN   • DIAGNOSTIC LAPAROSCOPY     • HYSTERECTOMY     • OOPHORECTOMY     • VENTRAL/INCISIONAL HERNIA REPAIR N/A 12/27/2016    Procedure: OPEN INCISIONAL HERNIA REPAIR WITH MESH;  Surgeon: Maximino Sparks MD;  Location: [facility];  Service:      Family History   Problem Relation Age of Onset   • Diabetes Other    • Heart disease Other    • Hypertension Other    • Heart attack Mother    • Diabetes Father    • Heart attack Father    • Breast cancer Neg Hx    • Ovarian cancer Neg Hx      Social History     Socioeconomic History   • Marital status:      Spouse name: Not on file   • Number of children: Not on file   • Years of education: Not on file   • Highest education level: Not on file   Tobacco Use   • Smoking status: Never Smoker   • " "Smokeless tobacco: Never Used   Substance and Sexual Activity   • Alcohol use: Yes     Alcohol/week: 4.0 standard drinks     Types: 4 Glasses of wine per week   • Drug use: No   • Sexual activity: Defer     No current outpatient medications on file prior to visit.     No current facility-administered medications on file prior to visit.       Review of Systems   Constitutional: Negative for activity change, appetite change, chills, fatigue, fever and unexpected weight change.   HENT: Negative for congestion, ear pain, nosebleeds, sneezing and sore throat.    Respiratory: Negative for cough, chest tightness and shortness of breath.    Cardiovascular: Negative for chest pain, palpitations and leg swelling.   Gastrointestinal: Negative for abdominal pain, constipation, diarrhea, nausea and vomiting.   Genitourinary: Negative for dysuria, frequency, hematuria and urgency.   Musculoskeletal: Negative for back pain and neck pain.   Neurological: Positive for numbness. Negative for headaches.   Psychiatric/Behavioral: Negative for agitation and sleep disturbance.   /78   Pulse 65   Temp 97.8 °F (36.6 °C)   Ht 167.6 cm (66\")   Wt 81.6 kg (180 lb)   SpO2 99%   BMI 29.05 kg/m²     Objective   Physical Exam  Vitals and nursing note reviewed.   HENT:      Nose: No rhinorrhea.   Eyes:      General: No scleral icterus.  Cardiovascular:      Rate and Rhythm: Normal rate and regular rhythm.   Pulmonary:      Effort: Pulmonary effort is normal. No respiratory distress.      Breath sounds: Normal breath sounds. No stridor. No wheezing or rhonchi.   Chest:      Chest wall: No tenderness.   Abdominal:      Palpations: Abdomen is soft.      Tenderness: There is no abdominal tenderness. There is no guarding or rebound.      Hernia: No hernia is present.   Musculoskeletal:      Right lower leg: No edema.      Left lower leg: No edema.   Neurological:      General: No focal deficit present.      Mental Status: She is alert and " oriented to person, place, and time.   Psychiatric:         Mood and Affect: Mood normal.         Behavior: Behavior normal.         Assessment/Plan   Diagnoses and all orders for this visit:    1. Closed fracture of distal end of right fibula with routine healing, unspecified fracture morphology, subsequent encounter  In boot and has f/u with ortho  2. Screening for thyroid disorder  -     TSH; Future    3. Numbness  -     Vitamin B12; Future  -     Folate; Future  -     EMG & Nerve Conduction Test; Future    4. Hyperglycemia  -     Hemoglobin A1c; Future

## 2021-05-05 LAB
FOLATE SERPL-MCNC: 10.6 NG/ML
HBA1C MFR BLD: 4.9 % (ref 4.8–5.6)
TSH SERPL DL<=0.005 MIU/L-ACNC: 2.63 UIU/ML (ref 0.45–4.5)
VIT B12 SERPL-MCNC: 814 PG/ML (ref 232–1245)

## 2021-05-18 ENCOUNTER — HOSPITAL ENCOUNTER (OUTPATIENT)
Dept: CT IMAGING | Facility: HOSPITAL | Age: 66
Discharge: HOME | End: 2021-05-18
Admitting: ORTHOPAEDIC SURGERY

## 2021-05-18 DIAGNOSIS — S82.831A OTHER CLOSED FRACTURE OF DISTAL END OF RIGHT FIBULA, INITIAL ENCOUNTER: ICD-10-CM

## 2021-05-18 PROCEDURE — 73700 CT LOWER EXTREMITY W/O DYE: CPT

## 2021-05-21 ENCOUNTER — OFFICE VISIT (OUTPATIENT)
Dept: ORTHOPEDIC SURGERY | Facility: CLINIC | Age: 66
End: 2021-05-21

## 2021-05-21 VITALS
HEART RATE: 79 BPM | WEIGHT: 179.9 LBS | HEIGHT: 66 IN | BODY MASS INDEX: 28.91 KG/M2 | SYSTOLIC BLOOD PRESSURE: 147 MMHG | DIASTOLIC BLOOD PRESSURE: 88 MMHG

## 2021-05-21 DIAGNOSIS — S82.201K TIBIA/FIBULA FRACTURE, RIGHT, CLOSED, WITH NONUNION, SUBSEQUENT ENCOUNTER: Primary | ICD-10-CM

## 2021-05-21 DIAGNOSIS — S82.401K TIBIA/FIBULA FRACTURE, RIGHT, CLOSED, WITH NONUNION, SUBSEQUENT ENCOUNTER: Primary | ICD-10-CM

## 2021-05-21 PROCEDURE — 99213 OFFICE O/P EST LOW 20 MIN: CPT | Performed by: ORTHOPAEDIC SURGERY

## 2021-05-21 NOTE — PROGRESS NOTES
"ESTABLISHED PATIENT    Patient: Samantha Gaines  : 1955    Primary Care Provider: Julia Figueroa DO    Requesting Provider: As above    Follow-up (3 week CT f/u--Other closed fracture of distal end of right fibula)      History    Chief Complaint: Right ankle pain    History of Present Illness: She returns for follow-up of the CT of her right ankle.  I looked at the films and the report.  It is dated 2021.  The CT is quite helpful.  It shows she had a moderate sized nondisplaced posterior malleolar fracture that is healed, the fibula fracture is not healed.  There is also a small avulsion off the distal lateral tibia.  The joint space is anatomic.  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  She is now 5 months after injury.  I think we have 2 treatment options at this point that are nonoperative.  I would not jump to surgery because she is otherwise healthy and I think she has a reasonable chance to heal this.  The nonoperative treatment options are to stay in the tall boot but wear it all the time including at night.  The other option is a solid cast.  I would also add a bone growth stimulator because she is 5 months into the nonunion.  She reports she will be very compliant with wearing the boot and only remove it for bathing.    No current outpatient medications on file prior to visit.     No current facility-administered medications on file prior to visit.      Allergies   Allergen Reactions   • Penicillins Hives      Past Medical History:   Diagnosis Date   • Breast cyst    • Diverticulitis of colon 2016   • Diverticulosis    • Essential hypertriglyceridemia    • Incisional hernia 2016   • PONV (postoperative nausea and vomiting)    • Tubular adenoma of colon 12/10/2020   • Vitamin D deficiency     \"HAD BLOODWORK RECENTLY AND MY DOCTOR DIDN'T SAY ANYTHING ABOUT THIS AT THAT TIME\"     Past Surgical History:   Procedure Laterality Date   • BREAST BIOPSY     • BREAST CYST EXCISION      " "PT UNSURE OF LATERALITY   • COLONOSCOPY  UNKNOWN   • DIAGNOSTIC LAPAROSCOPY     • HYSTERECTOMY     • OOPHORECTOMY     • VENTRAL/INCISIONAL HERNIA REPAIR N/A 12/27/2016    Procedure: OPEN INCISIONAL HERNIA REPAIR WITH MESH;  Surgeon: Maximino Sparks MD;  Location: [facility];  Service:      Family History   Problem Relation Age of Onset   • Diabetes Other    • Heart disease Other    • Hypertension Other    • Heart attack Mother    • Diabetes Father    • Heart attack Father    • Breast cancer Neg Hx    • Ovarian cancer Neg Hx       Social History     Socioeconomic History   • Marital status:      Spouse name: Not on file   • Number of children: Not on file   • Years of education: Not on file   • Highest education level: Not on file   Tobacco Use   • Smoking status: Never Smoker   • Smokeless tobacco: Never Used   Substance and Sexual Activity   • Alcohol use: Yes     Alcohol/week: 4.0 standard drinks     Types: 4 Glasses of wine per week   • Drug use: No   • Sexual activity: Defer        Review of Systems   Constitutional: Negative.    HENT: Negative.    Eyes: Negative.    Respiratory: Negative.    Cardiovascular: Negative.    Gastrointestinal: Negative.    Endocrine: Negative.    Genitourinary: Negative.    Musculoskeletal: Positive for arthralgias.   Skin: Negative.    Allergic/Immunologic: Negative.    Neurological: Negative.    Hematological: Negative.    Psychiatric/Behavioral: Negative.        The following portions of the patient's history were reviewed and updated as appropriate: allergies, current medications, past family history, past medical history, past social history, past surgical history and problem list.    Physical Exam:   /88   Pulse 79   Ht 167.6 cm (65.98\")   Wt 81.6 kg (179 lb 14.3 oz)   BMI 29.05 kg/m²   GENERAL: Gait: antalgic       MSK:  Ankle:  Right: Tender over the fibula fracture    NEURO Sensation:  diminished     Medical Decision Making    Data Review:   reviewed " radiology images and reviewed radiology results    Assessment/Plan/Diagnosis/Treatment Options:   1. Tibia/fibula fracture, right, closed, with nonunion, subsequent encounter  As above we discussed the treatment options.  She reports she will be compliant with the boot.  I do believe her, because she has also been very compliant with discussing the neuropathy with Dr. Figueroa, she had lab studies done and she has EMG nerve conduction studies scheduled.  We will add a bone growth stimulator because she is almost heading into 6 months after the injury.  I will see her again in 8 weeks with standing 3 views of the ankle.  - Osteogenesis Stimulator

## 2021-06-02 ENCOUNTER — HOSPITAL ENCOUNTER (OUTPATIENT)
Dept: NEUROLOGY | Facility: HOSPITAL | Age: 66
Discharge: HOME | End: 2021-06-02
Admitting: INTERNAL MEDICINE

## 2021-06-02 DIAGNOSIS — R20.0 NUMBNESS: ICD-10-CM

## 2021-06-02 PROCEDURE — 95886 MUSC TEST DONE W/N TEST COMP: CPT

## 2021-06-02 PROCEDURE — 95911 NRV CNDJ TEST 9-10 STUDIES: CPT

## 2021-06-09 ENCOUNTER — HOSPITAL ENCOUNTER (OUTPATIENT)
Dept: MAMMOGRAPHY | Facility: HOSPITAL | Age: 66
Discharge: HOME | End: 2021-06-09
Admitting: INTERNAL MEDICINE

## 2021-06-09 DIAGNOSIS — Z12.31 VISIT FOR SCREENING MAMMOGRAM: ICD-10-CM

## 2021-06-09 PROCEDURE — 77063 BREAST TOMOSYNTHESIS BI: CPT | Performed by: RADIOLOGY

## 2021-06-09 PROCEDURE — 77067 SCR MAMMO BI INCL CAD: CPT

## 2021-06-09 PROCEDURE — 77063 BREAST TOMOSYNTHESIS BI: CPT

## 2021-06-09 PROCEDURE — 77067 SCR MAMMO BI INCL CAD: CPT | Performed by: RADIOLOGY

## 2021-07-16 ENCOUNTER — OFFICE VISIT (OUTPATIENT)
Dept: ORTHOPEDIC SURGERY | Facility: CLINIC | Age: 66
End: 2021-07-16

## 2021-07-16 VITALS — HEIGHT: 66 IN | WEIGHT: 179.9 LBS | BODY MASS INDEX: 28.91 KG/M2 | HEART RATE: 88 BPM | OXYGEN SATURATION: 97 %

## 2021-07-16 DIAGNOSIS — Z09 FRACTURE FOLLOW-UP: Primary | ICD-10-CM

## 2021-07-16 PROCEDURE — 99213 OFFICE O/P EST LOW 20 MIN: CPT | Performed by: ORTHOPAEDIC SURGERY

## 2021-07-16 NOTE — PROGRESS NOTES
"ESTABLISHED PATIENT    Patient: Samantha Gaines  : 1955    Primary Care Provider: Julia Figueroa DO    Requesting Provider: As above    Follow-up (8 weeks- Other closed fracture of distal end of right fibula)      History    Chief Complaint: Right ankle fracture    History of Present Illness: She returns for follow-up of her right distal fibula fracture.  She reports less pain.  She has been very good about wearing the boot.  The bone stimulator was not covered by insurance so we do not have that.    No current outpatient medications on file prior to visit.     No current facility-administered medications on file prior to visit.      Allergies   Allergen Reactions   • Penicillins Hives      Past Medical History:   Diagnosis Date   • Breast cyst    • Diverticulitis of colon 2016   • Diverticulosis    • Essential hypertriglyceridemia    • Incisional hernia 2016   • PONV (postoperative nausea and vomiting)    • Tubular adenoma of colon 12/10/2020   • Vitamin D deficiency     \"HAD BLOODWORK RECENTLY AND MY DOCTOR DIDN'T SAY ANYTHING ABOUT THIS AT THAT TIME\"     Past Surgical History:   Procedure Laterality Date   • BREAST BIOPSY     • BREAST CYST EXCISION      PT UNSURE OF LATERALITY   • COLONOSCOPY  UNKNOWN   • DIAGNOSTIC LAPAROSCOPY     • HYSTERECTOMY     • OOPHORECTOMY     • VENTRAL/INCISIONAL HERNIA REPAIR N/A 2016    Procedure: OPEN INCISIONAL HERNIA REPAIR WITH MESH;  Surgeon: Maximino Sparks MD;  Location: [facility];  Service:      Family History   Problem Relation Age of Onset   • Diabetes Other    • Heart disease Other    • Hypertension Other    • Heart attack Mother    • Diabetes Father    • Heart attack Father    • Breast cancer Neg Hx    • Ovarian cancer Neg Hx       Social History     Socioeconomic History   • Marital status:      Spouse name: Not on file   • Number of children: Not on file   • Years of education: Not on file   • Highest education level: Not on file " "  Tobacco Use   • Smoking status: Never Smoker   • Smokeless tobacco: Never Used   Substance and Sexual Activity   • Alcohol use: Yes     Alcohol/week: 4.0 standard drinks     Types: 4 Glasses of wine per week   • Drug use: No   • Sexual activity: Defer        Review of Systems   Constitutional: Negative.    HENT: Negative.    Eyes: Negative.    Respiratory: Negative.    Cardiovascular: Negative.    Gastrointestinal: Negative.    Endocrine: Negative.    Genitourinary: Negative.    Musculoskeletal: Positive for arthralgias.   Skin: Negative.    Allergic/Immunologic: Negative.    Neurological: Negative.    Hematological: Negative.    Psychiatric/Behavioral: Negative.        The following portions of the patient's history were reviewed and updated as appropriate: allergies, current medications, past family history, past medical history, past social history, past surgical history and problem list.    Physical Exam:   Pulse 88   Ht 167.6 cm (65.98\")   Wt 81.6 kg (179 lb 14.3 oz)   SpO2 97%   BMI 29.05 kg/m²   GENERAL: Body habitus: overweight    Lower extremity edema: Left: trace; Right: trace    Gait: antalgic with the first few steps     Mental Status:  awake and alert; oriented to person, place, and time  MSK:  Mildly tender over the fibula fracture but much improved, nontender medially right ankle  Medical Decision Making    Data Review:   ordered and reviewed x-rays today    Assessment/Plan/Diagnosis/Treatment Options:   1. Fracture follow-up  Radiographically and clinically she is making good progress.  However given the length of time she had a nonunion I think we should overtreat rather than under treat.  I do not think it is time to order another CT scan.  I would recommend she stay in the boot for 6 weeks.  She may remove it at night now and remove it if she is just sitting.  Whenever she is walking she needs to be in the boot.  3 views of the ankle at next visit.  Of note she also had the nerve conduction " studies done on 6/2/-21, I reviewed them and the report, no sign of neuropathy, although understandably it is not as reliable for small fiber neuropathy.  She reports her sensation seems to be improved.  - XR Ankle 3+ View Right        Sydney Garza MD

## 2021-08-27 ENCOUNTER — OFFICE VISIT (OUTPATIENT)
Dept: ORTHOPEDIC SURGERY | Facility: CLINIC | Age: 66
End: 2021-08-27

## 2021-08-27 VITALS
WEIGHT: 179 LBS | HEIGHT: 66 IN | SYSTOLIC BLOOD PRESSURE: 138 MMHG | BODY MASS INDEX: 28.77 KG/M2 | DIASTOLIC BLOOD PRESSURE: 88 MMHG

## 2021-08-27 DIAGNOSIS — Z09 FRACTURE FOLLOW-UP: Primary | ICD-10-CM

## 2021-08-27 PROCEDURE — 99213 OFFICE O/P EST LOW 20 MIN: CPT | Performed by: ORTHOPAEDIC SURGERY

## 2021-08-27 NOTE — PROGRESS NOTES
"ESTABLISHED PATIENT    Patient: Samantha Gaines  : 1955    Primary Care Provider: Julia Figueroa DO    Requesting Provider: As above    Follow-up (6 week f/u Right ankle fracture DOI 2020)      History    Chief Complaint: Right ankle fracture    History of Present Illness: She returns for follow-up of her right distal fibula fracture.  She is feeling much better.  She has been wearing the boot.    No current outpatient medications on file prior to visit.     No current facility-administered medications on file prior to visit.      Allergies   Allergen Reactions   • Penicillins Hives      Past Medical History:   Diagnosis Date   • Breast cyst    • Diverticulitis of colon 2016   • Diverticulosis    • Essential hypertriglyceridemia    • Incisional hernia 2016   • PONV (postoperative nausea and vomiting)    • Tubular adenoma of colon 12/10/2020   • Vitamin D deficiency     \"HAD BLOODWORK RECENTLY AND MY DOCTOR DIDN'T SAY ANYTHING ABOUT THIS AT THAT TIME\"     Past Surgical History:   Procedure Laterality Date   • BREAST BIOPSY     • BREAST CYST EXCISION      PT UNSURE OF LATERALITY   • COLONOSCOPY  UNKNOWN   • DIAGNOSTIC LAPAROSCOPY     • HYSTERECTOMY     • OOPHORECTOMY     • VENTRAL/INCISIONAL HERNIA REPAIR N/A 2016    Procedure: OPEN INCISIONAL HERNIA REPAIR WITH MESH;  Surgeon: Maximino Sparks MD;  Location: [facility];  Service:      Family History   Problem Relation Age of Onset   • Diabetes Other    • Heart disease Other    • Hypertension Other    • Heart attack Mother    • Diabetes Father    • Heart attack Father    • Breast cancer Neg Hx    • Ovarian cancer Neg Hx       Social History     Socioeconomic History   • Marital status:      Spouse name: Not on file   • Number of children: Not on file   • Years of education: Not on file   • Highest education level: Not on file   Tobacco Use   • Smoking status: Never Smoker   • Smokeless tobacco: Never Used   Substance and Sexual " "Activity   • Alcohol use: Yes     Alcohol/week: 4.0 standard drinks     Types: 4 Glasses of wine per week   • Drug use: No   • Sexual activity: Defer        Review of Systems   Constitutional: Negative.    HENT: Negative.    Eyes: Negative.    Respiratory: Negative.    Cardiovascular: Negative.    Gastrointestinal: Negative.    Endocrine: Negative.    Genitourinary: Negative.    Musculoskeletal: Positive for arthralgias.   Skin: Negative.    Allergic/Immunologic: Negative.    Neurological: Negative.    Hematological: Negative.    Psychiatric/Behavioral: Negative.        The following portions of the patient's history were reviewed and updated as appropriate: allergies, current medications, past family history, past medical history, past social history, past surgical history and problem list.    Physical Exam:   /88   Ht 167.6 cm (65.98\")   Wt 81.2 kg (179 lb)   BMI 28.91 kg/m²   GENERAL: Gait: normal       MSK:  Ankle:  Right: Very slightly tender over the fracture, no swelling, no other tenderness;           Medical Decision Making    Data Review:   ordered and reviewed x-rays today    Assessment/Plan/Diagnosis/Treatment Options:   1. Fracture follow-up  She is much improved.  Radiographically and clinically.  She may come out of the boot now and start PT.  I will see her in 8 to 10 weeks for standing ankle x-ray  - XR Ankle 3+ View Right                            "

## 2021-09-07 ENCOUNTER — TREATMENT (OUTPATIENT)
Dept: PHYSICAL THERAPY | Facility: CLINIC | Age: 66
End: 2021-09-07

## 2021-09-07 DIAGNOSIS — Z09 FRACTURE FOLLOW-UP: ICD-10-CM

## 2021-09-07 DIAGNOSIS — Z74.09 IMPAIRED FUNCTIONAL MOBILITY, BALANCE, GAIT, AND ENDURANCE: ICD-10-CM

## 2021-09-07 DIAGNOSIS — G89.29 CHRONIC PAIN OF RIGHT ANKLE: Primary | ICD-10-CM

## 2021-09-07 DIAGNOSIS — M25.571 CHRONIC PAIN OF RIGHT ANKLE: Primary | ICD-10-CM

## 2021-09-07 PROCEDURE — 97530 THERAPEUTIC ACTIVITIES: CPT | Performed by: PHYSICAL THERAPIST

## 2021-09-07 PROCEDURE — 97162 PT EVAL MOD COMPLEX 30 MIN: CPT | Performed by: PHYSICAL THERAPIST

## 2021-09-07 NOTE — PROGRESS NOTES
Physical Therapy Initial Evaluation and Plan of Care    Patient: Samantha Gaines   : 1955  Diagnosis/ICD-10 Code:  Chronic pain of right ankle [M25.571, G89.29]  Referring practitioner: Sydney Álvarez MD  Date of Initial Visit: 2021  Today's Date: 2021  Patient seen for 1 sessions           Subjective Questionnaire: LEFS: 52/80      Subjective Evaluation    History of Present Illness  Date of onset: 2020  Mechanism of injury: Pt states she had a fall on [place], inverted right ankle and had immediate pain and swelling. States she took it easy and thought it was a bad ankle sprain. States she finally sought out treatment in April, was placed in a walking boot for 14 weeks, states she slept in the boot too. Pt was diagnosed with a nondisplaced distal fibula fracture, not healed. Had CT scan that revealed she had a moderate sized nondisplaced posterior malleolar fracture that is healed, the fibular fracture was not healed.  There was also a small avulsion off the distal lateral tibia.  Pt states she has now had to boot off for one week, notes pain along medial arch and under great toe, especially first thing in the morning. Pt states she is going out of town next week, will be gone for a week. Has f/u with Dr. Álvarez at the end of October.     Subjective comment: Pt presents to clinic months after right ankle fracture.   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 Full flight of stairs to bedroom, reciprocal with ascending, step to with descending.  Quality of life: good    Pain  Current pain ratin  At best pain ratin  At worst pain ratin  Quality: dull ache and tight  Relieving factors: change in position  Aggravating factors: ambulation and stairs  Progression: improved    Social Support  Lives in: multiple-level home  Lives with: spouse    Hand dominance: right    Patient Goals  Patient goals for therapy: increased motion and  increased strength  Patient goal: return to walking a couple miles a day.            Objective          Observations     Right Ankle/Foot   Positive for edema.     Additional Ankle/Foot Observation Details  Mild swelling; laterla malleoli    Palpation     Additional Palpation Details  Denies TTP    Tenderness     Additional Tenderness Details  Plantar surface first met head; medial arch    Neurological Testing     Sensation     Ankle/Foot   Left Ankle/Foot   Intact: light touch, hot/cold discrimination and kinesthesia    Right Ankle/Foot   Intact: light touch, hot/cold discrimination and kinesthesia     Active Range of Motion   Left Ankle/Foot   Dorsiflexion (kf): 16 degrees   Plantar flexion: 60 degrees   Inversion: 24 degrees   Eversion: 30 degrees     Right Ankle/Foot   Dorsiflexion (kf): 10 degrees   Plantar flexion: 52 degrees   Inversion: 18 degrees   Eversion: 30 degrees     Strength/Myotome Testing     Left Ankle/Foot   Normal strength    Right Ankle/Foot   Dorsiflexion: 4- (in limited AROM)  Plantar flexion: 4-  Inversion: 4-  Eversion: 4-    Ambulation     Comments   Pt ambulated with step through gait pattern, equal step lengths. Able to vary gait speed well.           Assessment & Plan     Assessment  Impairments: abnormal or restricted ROM, activity intolerance, impaired balance, impaired physical strength, lacks appropriate home exercise program and pain with function  Assessment details: Pt is a 65 yo female referred to PT following right ankle fracture who demonstrates limited ankle DF and inversion AROM, fatigue with prolonged walking and standing, and decreased activity tolerance. Pt had a she had a moderate sized nondisplaced posterior malleolar fracture that has healed, the fibula fracture wasn't healed, she was just d/c from tall walking boot last week. Pain is well controlled without medication. Due to nature of injury and length of time immobilized, patient would benefit from skilled PT to  improve functional mobility, decrease pain, improve balance, and increase activity with walking again for improved wellness/health.     she had a moderate sized nondisplaced posterior malleolar fracture that is healed, the fibula fracture is not healed.  There is also a small avulsion off the distal lateral tibia.    Prognosis: good  Functional Limitations: walking and standing  Goals  Plan Goals: SHORT TERM GOALS: 3-4 weeks  1. Patient is independent with HEP for flexibility and strengthening.  2. Pt will demonstrate ability to ambulate with step through gait pattern in the clinic without antalgic gait.  3. Pt will demonstrate ability to perform single leg heel raise on the right without increase in pain.  4. Patient will demonstrate right ankle dorsiflexion to at least 14 deg.       LONG TERM GOALS:   6-8 weeks  1. Pt to demonstrate ability to perform full functional squat with good form and no increase in pain in the right foot/ankle.  2. Patient reports of ability to walk up/down steps with reciprocal pattern, 1 handrail for support.   3. Pt to demonstrate ability to perform SLS on the right lower extremity for 30 seconds without LOB or increased pain.  4. LEFS score is improved by at least 10 points to demonstrated improved functional mobility.      Plan  Therapy options: will be seen for skilled physical therapy services  Planned modality interventions: cryotherapy, dry needling, thermotherapy (hydrocollator packs) and electrical stimulation/Russian stimulation  Planned therapy interventions: body mechanics training, flexibility, functional ROM exercises, gait training, home exercise program, joint mobilization, manual therapy, neuromuscular re-education, soft tissue mobilization, strengthening, stretching and therapeutic activities  Frequency: 2x week  Duration in visits: 10  Treatment plan discussed with: patient  Plan details: F/u with patient when she returns from vacation. Progress ankle 4 way, standing  NMRE exercises, and weight shifting as tolerated.         Manual Therapy:    0     mins  07316;  Therapeutic Exercise:    0     mins  48499;     Neuromuscular Lane:    0    mins  12929;    Therapeutic Activity:     10     mins  72643;     Gait Trainin     mins  31527;     Ultrasound:     0     mins  92605;    Electrical Stimulation:    0     mins  93667 ( );  Dry Needling     0     mins self-pay    Timed Treatment:   10   mins   Total Treatment:     43   mins    PT SIGNATURE: Corinne E. Perkins, PT   DATE TREATMENT INITIATED: 2021    Medicare Initial Certification  Certification Period: 2021  I certify that the therapy services are furnished while this patient is under my care.  The services outlined above are required by this patient, and will be reviewed every 90 days.     PHYSICIAN: Sydney Álvarez MD      DATE:     Please sign and return via fax to [phone].. Thank you, [facility].

## 2021-09-07 NOTE — PATIENT INSTRUCTIONS
HEP: toe intrinsics, gastroc stretch, ankle 4 way without resistance; discussed aquatics and walking on beach/support.

## 2021-09-19 ENCOUNTER — APPOINTMENT (OUTPATIENT)
Dept: RADIOLOGY | Facility: CLINIC | Age: 66
End: 2021-09-19

## 2021-09-19 PROCEDURE — 73130 X-RAY EXAM OF HAND: CPT | Performed by: FAMILY MEDICINE

## 2021-09-20 ENCOUNTER — TREATMENT (OUTPATIENT)
Dept: PHYSICAL THERAPY | Facility: CLINIC | Age: 66
End: 2021-09-20

## 2021-09-20 DIAGNOSIS — Z09 FRACTURE FOLLOW-UP: ICD-10-CM

## 2021-09-20 DIAGNOSIS — Z74.09 IMPAIRED FUNCTIONAL MOBILITY, BALANCE, GAIT, AND ENDURANCE: ICD-10-CM

## 2021-09-20 DIAGNOSIS — G89.29 CHRONIC PAIN OF RIGHT ANKLE: Primary | ICD-10-CM

## 2021-09-20 DIAGNOSIS — M25.571 CHRONIC PAIN OF RIGHT ANKLE: Primary | ICD-10-CM

## 2021-09-20 PROCEDURE — 97112 NEUROMUSCULAR REEDUCATION: CPT | Performed by: PHYSICAL THERAPIST

## 2021-09-20 PROCEDURE — 97110 THERAPEUTIC EXERCISES: CPT | Performed by: PHYSICAL THERAPIST
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
## 2021-09-25 ENCOUNTER — TELEPHONE (OUTPATIENT)
Dept: URGENT CARE | Facility: CLINIC | Age: 66
End: 2021-09-25

## 2021-09-27 ENCOUNTER — TREATMENT (OUTPATIENT)
Dept: PHYSICAL THERAPY | Facility: CLINIC | Age: 66
End: 2021-09-27

## 2021-09-27 DIAGNOSIS — G89.29 CHRONIC PAIN OF RIGHT ANKLE: Primary | ICD-10-CM

## 2021-09-27 DIAGNOSIS — M25.571 CHRONIC PAIN OF RIGHT ANKLE: Primary | ICD-10-CM

## 2021-09-27 DIAGNOSIS — Z74.09 IMPAIRED FUNCTIONAL MOBILITY, BALANCE, GAIT, AND ENDURANCE: ICD-10-CM

## 2021-09-27 DIAGNOSIS — Z09 FRACTURE FOLLOW-UP: ICD-10-CM

## 2021-09-27 PROCEDURE — 97110 THERAPEUTIC EXERCISES: CPT | Performed by: PHYSICAL THERAPIST

## 2021-09-27 PROCEDURE — 97112 NEUROMUSCULAR REEDUCATION: CPT | Performed by: PHYSICAL THERAPIST
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
## 2021-10-04 ENCOUNTER — TREATMENT (OUTPATIENT)
Dept: PHYSICAL THERAPY | Facility: CLINIC | Age: 66
End: 2021-10-04

## 2021-10-04 DIAGNOSIS — Z74.09 IMPAIRED FUNCTIONAL MOBILITY, BALANCE, GAIT, AND ENDURANCE: ICD-10-CM

## 2021-10-04 DIAGNOSIS — Z09 FRACTURE FOLLOW-UP: ICD-10-CM

## 2021-10-04 DIAGNOSIS — G89.29 CHRONIC PAIN OF RIGHT ANKLE: Primary | ICD-10-CM

## 2021-10-04 DIAGNOSIS — M25.571 CHRONIC PAIN OF RIGHT ANKLE: Primary | ICD-10-CM

## 2021-10-04 PROCEDURE — 97530 THERAPEUTIC ACTIVITIES: CPT | Performed by: PHYSICAL THERAPIST

## 2021-10-04 PROCEDURE — 97112 NEUROMUSCULAR REEDUCATION: CPT | Performed by: PHYSICAL THERAPIST

## 2021-10-04 PROCEDURE — 97110 THERAPEUTIC EXERCISES: CPT | Performed by: PHYSICAL THERAPIST

## 2021-10-04 NOTE — PROGRESS NOTES
Physical Therapy Re- Assessment/Certification Note    Date: 10/04/2021    Patient: Samantha Gaines  : 1955  Medical Record #: [id number]  Referring Provider: Sydney Álvarez MD    Visit Diagnosis/ICD-10 Code: Chronic pain of right ankle [M25.571, G89.29]  Patient seen for 4 sessions    Subjective   Patient states she has been more active on her feet, walking more with house work.     Pain Scale (0-10): 2-310  Functional Outcome Measure: LEFS:   Clinical Progress: improved  Home Program Compliance: Yes  Treatment has included: therapeutic exercise, neuromuscular re-education, manual therapy and therapeutic activity    Objective          Observations     Additional Ankle/Foot Observation Details  Mild swelling; lateral malleoli    Palpation     Additional Palpation Details  Denies TTP    Tenderness     Additional Tenderness Details  Plantar surface first met head; medial arc- mild TTP    Neurological Testing     Sensation     Ankle/Foot   Left Ankle/Foot   Intact: light touch, hot/cold discrimination and kinesthesia    Right Ankle/Foot   Intact: light touch, hot/cold discrimination and kinesthesia     Active Range of Motion   Left Ankle/Foot   Dorsiflexion (kf): 16 degrees   Plantar flexion: 60 degrees   Inversion: 28 degrees   Eversion: 30 degrees     Right Ankle/Foot   Dorsiflexion (kf): 14 degrees   Plantar flexion: 55 degrees   Inversion: 26 degrees   Eversion: 30 degrees     Passive Range of Motion     Right Ankle/Foot    Dorsiflexion (kf): 20 degrees     Strength/Myotome Testing     Left Ankle/Foot   Normal strength    Right Ankle/Foot   Dorsiflexion: 4+ (in limited AROM)  Plantar flexion: 4+  Inversion: 4  Eversion: 4    Ambulation     Ambulation: Stairs   Ascend stairs: independent  Pattern: reciprocal  Railings: one rail  Descend stairs: independent  Pattern: reciprocal  Railings: two rails    Comments   Pt ambulated with step through gait pattern, equal step lengths. Able to vary gait speed  well. Impaired balance with turning and faster gait speed noted.     SLS-L 15 sec; R 13 sec  Tandem stance: 30 sec EO bilaterally  Mod tandem- 30 sec EC bilaterally  Narrow CONTRERAS EO/EC on level and unlevel surface: 30 sec each            Treatment/Procedures/Modalities:   Manual Therapy: 0 Minutes  Therapeutic Exercise: 18 Minutes   Neuromuscular Re-Education: 10 Minutes   Therapeutic Activity: 10 Minutes  Gait Trainin Minutes   Moist Heat:    Ice:    E-Stim:    Ultrasound:    Ionto:   Traction:      Timed Code Treatment: 38 Minutes  Total Treatment Time: 38 Minutes    Goals:   Progress toward previous goals: Partially Met   Recommendations: Continue as planned  Timeframe: 1 month  Prognosis to achieve goals: good    Assessment & Plan     Assessment  Impairments: activity intolerance, impaired balance, impaired physical strength and pain with function  Assessment details: Reassessment completed today for 67 yo female referred to PT following right nondisplaced posterior malleolar fracture and fibular fracture who demonstrates improved ankle AROM; however continues to have fatigue with prolonged walking and standing, impaired static and dynamic balance, and decreased activity tolerance. Pain is well controlled without medication. Due to nature of injury and length of time immobilized, patient would benefit from skilled PT to improve functional mobility, decrease pain, improve balance, and increase activity with walking again for improved wellness/health.   Prognosis: good  Functional Limitations: walking and standing  Goals  Plan Goals: SHORT TERM GOALS: 3-4 weeks  1. Patient is independent with HEP for flexibility and strengthening. MET  2. Pt will demonstrate ability to ambulate with step through gait pattern in the clinic without antalgic gait. MET  3. Pt will demonstrate ability to perform single leg heel raise on the right without increase in pain. ONGOING  4. Patient will demonstrate right ankle dorsiflexion  to at least 14 deg. MET       LONG TERM GOALS:   6-8 weeks  1. Pt to demonstrate ability to perform full functional squat with good form and no increase in pain in the right foot/ankle. ONGOING  2. Patient reports of ability to walk up/down steps with reciprocal pattern, 1 handrail for support. MET  3. Pt to demonstrate ability to perform SLS on the right lower extremity for 30 seconds without LOB or increased pain. ONGOING  4. LEFS score is improved by at least 10 points to demonstrated improved functional mobility. ONGOING      Plan  Therapy options: will be seen for skilled physical therapy services  Planned modality interventions: cryotherapy, dry needling, thermotherapy (hydrocollator packs) and electrical stimulation/Russian stimulation  Planned therapy interventions: body mechanics training, flexibility, functional ROM exercises, gait training, home exercise program, joint mobilization, manual therapy, neuromuscular re-education, soft tissue mobilization, strengthening, stretching and therapeutic activities  Frequency: 2x week  Duration in visits: 10  Treatment plan discussed with: patient  Plan details: Progress standing NMRE exercises and functional strengthening as tolerated.         Progress per Plan of Care    I certify that the therapy services are furnished while this patient is under my care.  The services outlined above are required by this patient, and will be reviewed every 90 days.    Corinne E. Perkins, PT  Physical Therapist    Please sign and return via fax to [phone].. Thank you, [facility].

## 2021-10-06 ENCOUNTER — TREATMENT (OUTPATIENT)
Dept: PHYSICAL THERAPY | Facility: CLINIC | Age: 66
End: 2021-10-06

## 2021-10-06 DIAGNOSIS — G89.29 CHRONIC PAIN OF RIGHT ANKLE: Primary | ICD-10-CM

## 2021-10-06 DIAGNOSIS — Z74.09 IMPAIRED FUNCTIONAL MOBILITY, BALANCE, GAIT, AND ENDURANCE: ICD-10-CM

## 2021-10-06 DIAGNOSIS — M25.571 CHRONIC PAIN OF RIGHT ANKLE: Primary | ICD-10-CM

## 2021-10-06 DIAGNOSIS — Z09 FRACTURE FOLLOW-UP: ICD-10-CM

## 2021-10-06 PROCEDURE — 97112 NEUROMUSCULAR REEDUCATION: CPT | Performed by: PHYSICAL THERAPIST

## 2021-10-06 PROCEDURE — 97110 THERAPEUTIC EXERCISES: CPT | Performed by: PHYSICAL THERAPIST

## 2021-10-06 NOTE — PROGRESS NOTES
Physical Therapy Daily Progress Note    Date: 10/06/2021    Patient: Samantha Gaines  Medical Record #: [id number]  Referring Provider: Sydney Álvarez MD    Visit Diagnosis/ICD-10 Code: Chronic pain of right ankle [M25.571, G89.29]  Patient seen for 5 sessions    Subjective   Pt states she has been walking more, states she has used bike 1x since last week. She denies foot pain on arrival.     Pain Scale (0-10): 0/10      Objective   See Exercise, Manual and Modality logs for complete treatment.    Objective     Treatment/Procedures/Modalities:   Manual Therapy: 0 Minutes  Therapeutic Exercise: 12 Minutes   Neuromuscular Re-Education: 29 Minutes   Therapeutic Activity: 0 Minutes  Gait Trainin Minutes   Moist Heat:    Ice:    E-Stim:    Ultrasound:    Ionto:   Traction:      Timed Code Treatment: 41 Minutes  Total Treatment Time: 45 Minutes    Assessment / Plan:   Assessment/Plan   Pt denies pain in foot during exercise and balance training. More frequent LOB noted with dynamic standing balance exercises, performed in parallel bars for BUE support as needed.      Progress per Plan of Care. Assess response from progressed HEP with dynamic weight shifting and NMRE exercises.       Corinne E. Perkins, PT  Physical Therapist

## 2021-10-11 ENCOUNTER — TREATMENT (OUTPATIENT)
Dept: PHYSICAL THERAPY | Facility: CLINIC | Age: 66
End: 2021-10-11

## 2021-10-11 DIAGNOSIS — Z09 FRACTURE FOLLOW-UP: ICD-10-CM

## 2021-10-11 DIAGNOSIS — Z74.09 IMPAIRED FUNCTIONAL MOBILITY, BALANCE, GAIT, AND ENDURANCE: ICD-10-CM

## 2021-10-11 DIAGNOSIS — G89.29 CHRONIC PAIN OF RIGHT ANKLE: Primary | ICD-10-CM

## 2021-10-11 DIAGNOSIS — M25.571 CHRONIC PAIN OF RIGHT ANKLE: Primary | ICD-10-CM

## 2021-10-11 PROCEDURE — 97110 THERAPEUTIC EXERCISES: CPT | Performed by: PHYSICAL THERAPIST

## 2021-10-11 PROCEDURE — 97112 NEUROMUSCULAR REEDUCATION: CPT | Performed by: PHYSICAL THERAPIST

## 2021-10-11 NOTE — PROGRESS NOTES
Physical Therapy Daily Progress Note    Date: 10/11/2021    Patient: Samantha Gaines  Medical Record #: [id number]  Referring Provider: Sydney Álvarez MD    Visit Diagnosis/ICD-10 Code: Chronic pain of right ankle [M25.571, G89.29]  Patient seen for 6 sessions    Subjective   Pt states she was able to bike for 10 min over the weekend, went about 1 mile without issue in right foot, some SOA noted.     Pain Scale (0-10): 0/10      Objective   See Exercise, Manual and Modality logs for complete treatment.    Objective     Treatment/Procedures/Modalities:   Manual Therapy: 0 Minutes  Therapeutic Exercise: 15 Minutes   Neuromuscular Re-Education: 23 Minutes   Therapeutic Activity: 0 Minutes  Gait Trainin Minutes   Moist Heat:    Ice:    E-Stim:    Ultrasound:    Ionto:   Traction:      Timed Code Treatment: 38 Minutes  Total Treatment Time: 38 Minutes    Assessment / Plan:   Assessment/Plan   Pt verbalized compliance with current HEP, using green theraband at home with ankle 4 way exercise and able to increase biking time. She denies right ankle pain during treatment, making steady progress with static and dynamic balance training. Multiple LOB with dynamic balance exercises today, able to recovering independently, PT was SBA.     Progress per Plan of Care.       Corinne E. Perkins, PT  Physical Therapist

## 2021-10-13 ENCOUNTER — TREATMENT (OUTPATIENT)
Dept: PHYSICAL THERAPY | Facility: CLINIC | Age: 66
End: 2021-10-13

## 2021-10-13 DIAGNOSIS — Z09 FRACTURE FOLLOW-UP: ICD-10-CM

## 2021-10-13 DIAGNOSIS — G89.29 CHRONIC PAIN OF RIGHT ANKLE: Primary | ICD-10-CM

## 2021-10-13 DIAGNOSIS — M25.571 CHRONIC PAIN OF RIGHT ANKLE: Primary | ICD-10-CM

## 2021-10-13 DIAGNOSIS — Z74.09 IMPAIRED FUNCTIONAL MOBILITY, BALANCE, GAIT, AND ENDURANCE: ICD-10-CM

## 2021-10-13 PROCEDURE — 97110 THERAPEUTIC EXERCISES: CPT | Performed by: PHYSICAL THERAPIST

## 2021-10-13 PROCEDURE — 97112 NEUROMUSCULAR REEDUCATION: CPT | Performed by: PHYSICAL THERAPIST

## 2021-10-13 NOTE — PROGRESS NOTES
Physical Therapy Daily Progress Note    Date: 10/13/2021    Patient: Samantha Gaines  Medical Record #: [id number]  Referring Provider: Sydney Álvarez MD    Visit Diagnosis/ICD-10 Code: Chronic pain of right ankle [M25.571, G89.29]  Patient seen for 7 sessions    Subjective   Pt states she thinks her right ankle is sore after increased activity.   Pain Scale (0-10): 2/10      Objective   See Exercise, Manual and Modality logs for complete treatment.    Objective     Treatment/Procedures/Modalities:   Manual Therapy: 0 Minutes  Therapeutic Exercise: 20 Minutes   Neuromuscular Re-Education: 23 Minutes   Therapeutic Activity: 0 Minutes  Gait Trainin Minutes   Moist Heat:    Ice:    E-Stim:    Ultrasound:    Ionto:   Traction:      Timed Code Treatment: 43 Minutes  Total Treatment Time: 43 Minutes    Assessment / Plan:   Assessment/Plan   Pt demonstrates functional weakness of left hip, loses balance to left during dynamic balance exercises. Fatigue and mild ache pain in lateral right ankle noted after all activity, rated 2/10. Pt is improving with dynamic balance exercises overall and verbalized compliance with current HEP.     Progress per Plan of Care. Could revise HEP to include hip strengthening, ankle and NMRE options. Pt has been using gym at [facility] while waiting on house to be remodeled.       Corinne E. Perkins, PT  Physical Therapist

## 2021-10-18 ENCOUNTER — TREATMENT (OUTPATIENT)
Dept: PHYSICAL THERAPY | Facility: CLINIC | Age: 66
End: 2021-10-18

## 2021-10-18 DIAGNOSIS — G89.29 CHRONIC PAIN OF RIGHT ANKLE: Primary | ICD-10-CM

## 2021-10-18 DIAGNOSIS — M25.571 CHRONIC PAIN OF RIGHT ANKLE: Primary | ICD-10-CM

## 2021-10-18 DIAGNOSIS — Z09 FRACTURE FOLLOW-UP: ICD-10-CM

## 2021-10-18 DIAGNOSIS — Z74.09 IMPAIRED FUNCTIONAL MOBILITY, BALANCE, GAIT, AND ENDURANCE: ICD-10-CM

## 2021-10-18 PROCEDURE — 97112 NEUROMUSCULAR REEDUCATION: CPT | Performed by: PHYSICAL THERAPIST

## 2021-10-18 PROCEDURE — 97110 THERAPEUTIC EXERCISES: CPT | Performed by: PHYSICAL THERAPIST

## 2021-10-18 NOTE — PROGRESS NOTES
Physical Therapy Daily Progress Note    Date: 10/18/2021    Patient: Samantha Gaines  Medical Record #: [id number]  Referring Provider: Sydney Álvarez MD    Visit Diagnosis/ICD-10 Code: Chronic pain of right ankle [M25.571, G89.29]  Patient seen for 8 sessions    Subjective   Pt states she did more stairs overall over the weekend. States her ankle was fatigued for about 12-24 hours after therapy last visit, but denies pain or other issues. States she hasn't performed as many isolated exercises at home for ankle in particular, but has had increased overall activity with walking on grass, stairs, and moving things.     Pain Scale (0-10): 0/10      Objective   See Exercise, Manual and Modality logs for complete treatment.    Objective     Treatment/Procedures/Modalities:   Manual Therapy: 0 Minutes  Therapeutic Exercise: 15 Minutes   Neuromuscular Re-Education: 25 Minutes   Therapeutic Activity: 0 Minutes  Gait Trainin Minutes   Moist Heat:    Ice:    E-Stim:    Ultrasound:    Ionto:   Traction:      Timed Code Treatment: 40 Minutes  Total Treatment Time: 40 Minutes    Assessment / Plan:   Assessment/Plan   Pt demonstrates improved tandem stance walking in clinic on level surface without UE support required. She required UE support for balance with tandem walking on foam, with most LOB going towards left side. She denies ankle pain or fatigue in clinic.     Progress per Plan of Care. Decreased PT frequency to 1x/week to conserve visits, increase emphasis on HEP. Reviewed and modified HEP to include hip strengthening exercises for left side functional deficit. Assess compliance with progressed HEP next visit.  Consider repeat agility ladder drills next visit.       Corinne E. Perkins, PT  Physical Therapist

## 2021-10-18 NOTE — PATIENT INSTRUCTIONS
Access Code: [email or web address]  URL: [email or web address]  Date: 10/18/2021  Prepared by: Corinne Perkins    Exercises  Side Stepping with Resistance at Thighs - 1 x daily - 2-3 x weekly - 1-2 sets - 10 reps  Standing Hip Extension Kicks - 1 x daily - 2-3 x weekly - 1 sets - 10 reps  Wall Quarter Squat with Swiss Ball - 1 x daily - 2-3 x weekly - 1 sets - 10 reps  Single Leg Balance with Clock Reach - 1 x daily - 2-3 x weekly - 1 sets - 10 reps  Walking Tandem Stance - 1 x daily - 2-3 x weekly - 1 sets - 10 reps

## 2021-10-27 ENCOUNTER — TREATMENT (OUTPATIENT)
Dept: PHYSICAL THERAPY | Facility: CLINIC | Age: 66
End: 2021-10-27

## 2021-10-27 DIAGNOSIS — Z74.09 IMPAIRED FUNCTIONAL MOBILITY, BALANCE, GAIT, AND ENDURANCE: ICD-10-CM

## 2021-10-27 DIAGNOSIS — Z09 FRACTURE FOLLOW-UP: ICD-10-CM

## 2021-10-27 DIAGNOSIS — G89.29 CHRONIC PAIN OF RIGHT ANKLE: Primary | ICD-10-CM

## 2021-10-27 DIAGNOSIS — M25.571 CHRONIC PAIN OF RIGHT ANKLE: Primary | ICD-10-CM

## 2021-10-27 PROCEDURE — 97110 THERAPEUTIC EXERCISES: CPT | Performed by: PHYSICAL THERAPIST

## 2021-10-27 PROCEDURE — 97112 NEUROMUSCULAR REEDUCATION: CPT | Performed by: PHYSICAL THERAPIST

## 2021-10-27 NOTE — PROGRESS NOTES
Physical Therapy Daily Progress Note    Date: 10/27/2021    Patient: Samantha Gaines  Medical Record #: [id number]  Referring Provider: Sydney Álvarez MD    Visit Diagnosis/ICD-10 Code: Chronic pain of right ankle [M25.571, G89.29]  Patient seen for 9 sessions    Subjective   Pt states she has been doing her exercises at home, walking and packing/lifting more with her move/renovation at home. She denies ankle pain since last visit.     Pain Scale (0-10): 0/10      Objective   See Exercise, Manual and Modality logs for complete treatment.    Objective     Treatment/Procedures/Modalities:   Manual Therapy: 0 Minutes  Therapeutic Exercise: 18 Minutes   Neuromuscular Re-Education: 23 Minutes   Therapeutic Activity: 0 Minutes  Gait Trainin Minutes   Moist Heat:    Ice:    E-Stim:    Ultrasound:    Ionto:   Traction:      Timed Code Treatment: 41 Minutes  Total Treatment Time: 41 Minutes    Assessment / Plan:   Assessment/Plan   Pt had a right nondisplaced posterior malleolar fracture and fibular fracture in 2020, had delayed treatment and was d/c from [employer] walking boot at the end of August. She has been compliant with therapy, demonstrates improved active DF and inversion AROM, verbalized improved activity tolerance and denies fatigue/pain in ankle for the past week. She is progressing towards her functional goals as expected; however continues to demonstrate impaired dynamic standing balance with functional hip weakness noted. Recommend further skilled PT for improved balance to further decrease fall risk or re-injury for right ankle.      Progress per Plan of Care. Reassessment next visit. F/u with patient after MD appt.       Corinne E. Perkins, PT  Physical Therapist

## 2021-10-29 ENCOUNTER — OFFICE VISIT (OUTPATIENT)
Dept: ORTHOPEDIC SURGERY | Facility: CLINIC | Age: 66
End: 2021-10-29

## 2021-10-29 VITALS — WEIGHT: 184 LBS | BODY MASS INDEX: 29.57 KG/M2 | HEIGHT: 66 IN

## 2021-10-29 DIAGNOSIS — Z09 FRACTURE FOLLOW-UP: Primary | ICD-10-CM

## 2021-10-29 PROCEDURE — 99212 OFFICE O/P EST SF 10 MIN: CPT | Performed by: ORTHOPAEDIC SURGERY

## 2021-10-29 NOTE — PROGRESS NOTES
"ESTABLISHED PATIENT    Patient: Samantha Gaines  : 1955    Primary Care Provider: Julia Figueroa DO    Requesting Provider: As above    Follow-up (2 month follow up - Right ankle fracture DOI 2020)      History    Chief Complaint: Right ankle fracture    History of Present Illness: She returns for follow-up of the delayed union of her right distal fibula fracture, injury 2020.  She has been doing PT.  She reports no further pain.    No current outpatient medications on file prior to visit.     No current facility-administered medications on file prior to visit.      Allergies   Allergen Reactions   • Penicillins Hives      Past Medical History:   Diagnosis Date   • Breast cyst    • Diverticulitis of colon 2016   • Diverticulosis    • Essential hypertriglyceridemia    • Incisional hernia 2016   • PONV (postoperative nausea and vomiting)    • Tubular adenoma of colon 12/10/2020   • Vitamin D deficiency     \"HAD BLOODWORK RECENTLY AND MY DOCTOR DIDN'T SAY ANYTHING ABOUT THIS AT THAT TIME\"     Past Surgical History:   Procedure Laterality Date   • BREAST BIOPSY     • BREAST CYST EXCISION      PT UNSURE OF LATERALITY   • COLONOSCOPY  UNKNOWN   • DIAGNOSTIC LAPAROSCOPY     • HYSTERECTOMY     • OOPHORECTOMY     • VENTRAL/INCISIONAL HERNIA REPAIR N/A 2016    Procedure: OPEN INCISIONAL HERNIA REPAIR WITH MESH;  Surgeon: Maximino Sparks MD;  Location: [facility];  Service:      Family History   Problem Relation Age of Onset   • Diabetes Other    • Heart disease Other    • Hypertension Other    • Heart attack Mother    • Diabetes Father    • Heart attack Father    • Breast cancer Neg Hx    • Ovarian cancer Neg Hx       Social History     Socioeconomic History   • Marital status:    Tobacco Use   • Smoking status: Never Smoker   • Smokeless tobacco: Never Used   Substance and Sexual Activity   • Alcohol use: Yes     Alcohol/week: 4.0 standard drinks     Types: 4 Glasses of wine " "per week   • Drug use: No   • Sexual activity: Defer        Review of Systems   Constitutional: Negative.    HENT: Negative.    Eyes: Negative.    Respiratory: Negative.    Cardiovascular: Negative.    Gastrointestinal: Negative.    Endocrine: Negative.    Genitourinary: Negative.    Musculoskeletal: Positive for arthralgias.   Skin: Negative.    Allergic/Immunologic: Negative.    Neurological: Negative.    Hematological: Negative.    Psychiatric/Behavioral: Negative.        The following portions of the patient's history were reviewed and updated as appropriate: allergies, current medications, past family history, past medical history, past social history, past surgical history and problem list.    Physical Exam:   Ht 167.6 cm (65.98\")   Wt 83.5 kg (184 lb)   BMI 29.71 kg/m²   GENERAL: Body habitus: overweight    Lower extremity edema: Left: none; Right: none    Gait: normal     Mental Status:  awake and alert; oriented to person, place, and time  MSK:  Tibia:  Right:  non tender;         Ankle:  Right: non tender, ROM  normal and motor function  normal;    Medical Decision Making    Data Review:   ordered and reviewed x-rays today    Assessment/Plan/Diagnosis/Treatment Options:   1. Fracture follow-up  She has excellent range of motion no tenderness, she has fully healed.  I will be happy to see her anytime  - XR Ankle 3+ View Right        Sydney Garza MD                      "

## 2021-11-01 ENCOUNTER — TREATMENT (OUTPATIENT)
Dept: PHYSICAL THERAPY | Facility: CLINIC | Age: 66
End: 2021-11-01

## 2021-11-01 DIAGNOSIS — M25.571 CHRONIC PAIN OF RIGHT ANKLE: Primary | ICD-10-CM

## 2021-11-01 DIAGNOSIS — Z74.09 IMPAIRED FUNCTIONAL MOBILITY, BALANCE, GAIT, AND ENDURANCE: ICD-10-CM

## 2021-11-01 DIAGNOSIS — G89.29 CHRONIC PAIN OF RIGHT ANKLE: Primary | ICD-10-CM

## 2021-11-01 DIAGNOSIS — Z09 FRACTURE FOLLOW-UP: ICD-10-CM

## 2021-11-01 PROCEDURE — 97112 NEUROMUSCULAR REEDUCATION: CPT | Performed by: PHYSICAL THERAPIST

## 2021-11-01 PROCEDURE — 97110 THERAPEUTIC EXERCISES: CPT | Performed by: PHYSICAL THERAPIST

## 2021-11-01 NOTE — PROGRESS NOTES
Physical Therapy Re- Assessment/Certification Note    Date: 2021    Patient: Samantha Gaines  : 1955  Medical Record #: [id number]  Referring Provider: Sydney Álvarez MD    Visit Diagnosis/ICD-10 Code: Chronic pain of right ankle [M25.571, G89.29]  Patient seen for 10 sessions    Subjective   Patient states her f/u with Dr. Álvarez went well, states she is pleased with progress for ankle mobility but still having balance deficits. States she can tell her left hip is weaker, losing balance typically to the left side.    Pain Scale (0-10): 0/10  Functional Outcome Measure: LEFS: 70  Clinical Progress: improved  Home Program Compliance: Yes  Treatment has included: therapeutic exercise, neuromuscular re-education, manual therapy and therapeutic activity    Objective          Palpation     Additional Palpation Details  Denies TTP    Neurological Testing     Sensation     Ankle/Foot   Left Ankle/Foot   Intact: light touch, hot/cold discrimination and kinesthesia    Right Ankle/Foot   Intact: light touch, hot/cold discrimination and kinesthesia     Active Range of Motion   Left Ankle/Foot   Dorsiflexion (kf): 16 degrees   Plantar flexion: 60 degrees   Inversion: 28 degrees   Eversion: 30 degrees     Right Ankle/Foot   Dorsiflexion (kf): 16 degrees   Plantar flexion: 58 degrees   Inversion: 26 degrees   Eversion: 30 degrees     Passive Range of Motion     Right Ankle/Foot    Dorsiflexion (kf): 20 degrees     Strength/Myotome Testing     Left Ankle/Foot   Normal strength    Right Ankle/Foot   Dorsiflexion: 4+ (in limited AROM)  Plantar flexion: 4+  Inversion: 4  Eversion: 4    Ambulation     Ambulation: Stairs   Ascend stairs: independent  Pattern: reciprocal  Railings: one rail  Descend stairs: independent  Pattern: reciprocal  Railings: two rails    Comments   Pt ambulated with step through gait pattern, equal step lengths. Able to vary gait speed well. Impaired balance with turning and faster gait speed  noted.     SLS-L 14 sec; R 8 sec  Tandem stance: 30 sec EO bilaterally  Mod tandem- 30 sec EO bilaterally ; EC RLE 3 sec, 8 sec; LLE EC 28 seconds  Narrow CONTRERAS EO/EC on level and unlevel surface: 30 sec each            Treatment/Procedures/Modalities:   Manual Therapy: 0 Minutes  Therapeutic Exercise: 23 Minutes   Neuromuscular Re-Education: 20 Minutes   Therapeutic Activity: 0 Minutes  Gait Trainin Minutes   Moist Heat:    Ice:    E-Stim:    Ultrasound:    Ionto:   Traction:      Timed Code Treatment: 43 Minutes  Total Treatment Time: 43 Minutes    Goals:   Progress toward previous goals: Partially Met   Recommendations: Continue as planned  Timeframe: 1 month  Prognosis to achieve goals: good    Assessment & Plan     Assessment  Impairments: activity intolerance, impaired balance and impaired physical strength  Assessment details: Reassessment completed today for 65 yo female referred to PT following right nondisplaced posterior malleolar fracture and fibular fracture who demonstrates improved ankle AROM; however continues to have fatigue with prolonged walking and standing, impaired static and dynamic balance, and decreased activity tolerance. Pain is well controlled without medication. Due to nature of injury and length of time immobilized, patient would benefit from skilled PT to improve functional mobility, decrease pain, improve balance, and increase activity with walking again for improved wellness/health.   Prognosis: good  Functional Limitations: walking and standing  Goals  Plan Goals: SHORT TERM GOALS: 3-4 weeks  1. Patient is independent with HEP for flexibility and strengthening. MET  2. Pt will demonstrate ability to ambulate with step through gait pattern in the clinic without antalgic gait. MET  3. Pt will demonstrate ability to perform single leg heel raise on the right without increase in pain. ONGOING  4. Patient will demonstrate right ankle dorsiflexion to at least 14 deg. MET       LONG TERM  GOALS:   6-8 weeks  1. Pt to demonstrate ability to perform full functional squat with good form and no increase in pain in the right foot/ankle. ONGOING  2. Patient reports of ability to walk up/down steps with reciprocal pattern, 1 handrail for support. MET  3. Pt to demonstrate ability to perform SLS on the right lower extremity for 30 seconds without LOB or increased pain. ONGOING  4. LEFS score is improved by at least 10 points to demonstrated improved functional mobility. ONGOING      Plan  Therapy options: will be seen for skilled physical therapy services  Planned modality interventions: cryotherapy, dry needling, thermotherapy (hydrocollator packs) and electrical stimulation/Russian stimulation  Planned therapy interventions: body mechanics training, flexibility, functional ROM exercises, gait training, home exercise program, joint mobilization, manual therapy, neuromuscular re-education, soft tissue mobilization, strengthening, stretching and therapeutic activities  Frequency: 1x week  Duration in visits: 6  Treatment plan discussed with: patient  Plan details: Progress standing NMRE exercises and functional strengthening as tolerated.         Progress per Plan of Care    I certify that the therapy services are furnished while this patient is under my care.  The services outlined above are required by this patient, and will be reviewed every 90 days.    Corinne E. Perkins, PT  Physical Therapist    Please sign and return via fax to [phone].. Thank you, [facility].

## 2021-11-08 ENCOUNTER — TREATMENT (OUTPATIENT)
Dept: PHYSICAL THERAPY | Facility: CLINIC | Age: 66
End: 2021-11-08

## 2021-11-08 DIAGNOSIS — M25.571 CHRONIC PAIN OF RIGHT ANKLE: Primary | ICD-10-CM

## 2021-11-08 DIAGNOSIS — Z09 FRACTURE FOLLOW-UP: ICD-10-CM

## 2021-11-08 DIAGNOSIS — G89.29 CHRONIC PAIN OF RIGHT ANKLE: Primary | ICD-10-CM

## 2021-11-08 DIAGNOSIS — Z74.09 IMPAIRED FUNCTIONAL MOBILITY, BALANCE, GAIT, AND ENDURANCE: ICD-10-CM

## 2021-11-08 PROCEDURE — 97112 NEUROMUSCULAR REEDUCATION: CPT | Performed by: PHYSICAL THERAPIST

## 2021-11-08 PROCEDURE — 97110 THERAPEUTIC EXERCISES: CPT | Performed by: PHYSICAL THERAPIST

## 2021-11-08 NOTE — PROGRESS NOTES
Physical Therapy Daily Progress Note    Date: 2021    Patient: Samantha Gaines  Medical Record #: [id number]  Referring Provider: Sydney Álvarez MD    Visit Diagnosis/ICD-10 Code: Chronic pain of right ankle [M25.571, G89.29]  Patient seen for 11 sessions    Subjective   Pt states she has been working in her house more, doing about 10 flights of stairs daily. She reports less compliance with current HEP due to change of schedule.     Pain Scale (0-10): 0/10      Objective   See Exercise, Manual and Modality logs for complete treatment.    Objective     Treatment/Procedures/Modalities:   Manual Therapy: 0 Minutes  Therapeutic Exercise: 14 Minutes   Neuromuscular Re-Education: 24 Minutes   Therapeutic Activity: 0 Minutes  Gait Trainin Minutes   Moist Heat:    Ice:    E-Stim:    Ultrasound:    Ionto:   Traction:      Timed Code Treatment: 38 Minutes  Total Treatment Time: 38 Minutes    Assessment / Plan:   Assessment/Plan   Pt denies ankle pain in clinic, reports minimal fatigue post treatment. She verbalized increased activity tolerance with stair climbing, walking, and standing recently. She continues to have impaired dynamic standing balance with functional left hip weakness, progressing towards functional goals as expected.     Progress per Plan of Care.       Corinne E. Perkins, PT  Physical Therapist

## 2021-11-15 ENCOUNTER — TREATMENT (OUTPATIENT)
Dept: PHYSICAL THERAPY | Facility: CLINIC | Age: 66
End: 2021-11-15

## 2021-11-15 DIAGNOSIS — M25.571 CHRONIC PAIN OF RIGHT ANKLE: Primary | ICD-10-CM

## 2021-11-15 DIAGNOSIS — Z09 FRACTURE FOLLOW-UP: ICD-10-CM

## 2021-11-15 DIAGNOSIS — Z74.09 IMPAIRED FUNCTIONAL MOBILITY, BALANCE, GAIT, AND ENDURANCE: ICD-10-CM

## 2021-11-15 DIAGNOSIS — G89.29 CHRONIC PAIN OF RIGHT ANKLE: Primary | ICD-10-CM

## 2021-11-15 PROCEDURE — 97112 NEUROMUSCULAR REEDUCATION: CPT | Performed by: PHYSICAL THERAPIST

## 2021-11-15 NOTE — PROGRESS NOTES
Physical Therapy Daily Progress Note    Date: 11/15/2021    Patient: Samantha Gaines  Medical Record #: [id number]  Referring Provider: Sydney Álvarez MD    Visit Diagnosis/ICD-10 Code: Chronic pain of right ankle [M25.571, G89.29]  Patient seen for 12 sessions    Subjective   Pt states she gets stiffness in left lateral hip and can tell it is much weaker than her right hip. She states her right ankle has been feeling well. States she is excited for forward progress for her house projects, has been doing stairs frequently.     Pain Scale (0-10): 0/10      Objective   See Exercise, Manual and Modality logs for complete treatment.    Objective     Treatment/Procedures/Modalities:   Manual Therapy: 0 Minutes  Therapeutic Exercise: 5 Minutes   Neuromuscular Re-Education: 30 Minutes   Therapeutic Activity: 0 Minutes  Gait Trainin Minutes   Moist Heat:    Ice:    E-Stim:    Ultrasound:    Ionto:   Traction:      Timed Code Treatment: 35 Minutes  Total Treatment Time: 35 Minutes    Assessment / Plan:   Assessment/Plan   Pt required CGA for BOSU squats; however demonstrates improved lunge forward technique and dynamic balance. She denies ankle pain, intermittent compliance with HEP. Left functional hip weakness persists.     Progress per Plan of Care. Reassessment next visit. Anticipate d/c from OPPT to home with progressed HEP next visit, pending symptoms.       Corinne E. Perkins, PT  Physical Therapist

## 2021-11-22 ENCOUNTER — TREATMENT (OUTPATIENT)
Dept: PHYSICAL THERAPY | Facility: CLINIC | Age: 66
End: 2021-11-22

## 2021-11-22 DIAGNOSIS — M25.571 CHRONIC PAIN OF RIGHT ANKLE: Primary | ICD-10-CM

## 2021-11-22 DIAGNOSIS — Z09 FRACTURE FOLLOW-UP: ICD-10-CM

## 2021-11-22 DIAGNOSIS — G89.29 CHRONIC PAIN OF RIGHT ANKLE: Primary | ICD-10-CM

## 2021-11-22 DIAGNOSIS — Z74.09 IMPAIRED FUNCTIONAL MOBILITY, BALANCE, GAIT, AND ENDURANCE: ICD-10-CM

## 2021-11-22 PROCEDURE — 97112 NEUROMUSCULAR REEDUCATION: CPT | Performed by: PHYSICAL THERAPIST

## 2021-11-22 PROCEDURE — 97110 THERAPEUTIC EXERCISES: CPT | Performed by: PHYSICAL THERAPIST

## 2021-11-22 NOTE — PROGRESS NOTES
Physical Therapy Re- Assessment/Certification and Discharge Note    Date: 2021    Patient: Samantha Gaines  : 1955  Medical Record #: [id number]  Referring Provider: Sydney Álvarez MD    Visit Diagnosis/ICD-10 Code: Chronic pain of right ankle [M25.571, G89.29]  Patient seen for 13 sessions    Subjective   Patient states her ankle hasn't given her any issues recently. Reports she has been going up/down stairs more and able to carry loads without pain. She states she does have a f/u with her PCP in Dec and would like to discuss option of PT for left hip strengthening.     Pain Scale (0-10): 0/10  Functional Outcome Measure: LEFS: 71/80  Clinical Progress: improved  Home Program Compliance: Yes  Treatment has included: therapeutic exercise, neuromuscular re-education, manual therapy and therapeutic activity    Objective          Palpation     Additional Palpation Details  Denies TTP    Neurological Testing     Sensation     Ankle/Foot   Left Ankle/Foot   Intact: light touch, hot/cold discrimination and kinesthesia    Right Ankle/Foot   Intact: light touch, hot/cold discrimination and kinesthesia     Active Range of Motion   Left Ankle/Foot   Dorsiflexion (kf): 16 degrees   Plantar flexion: 60 degrees   Inversion: 28 degrees   Eversion: 30 degrees     Right Ankle/Foot   Dorsiflexion (kf): 16 degrees   Plantar flexion: 58 degrees   Inversion: 26 degrees   Eversion: 30 degrees     Passive Range of Motion     Right Ankle/Foot    Dorsiflexion (kf): 20 degrees     Strength/Myotome Testing     Left Ankle/Foot   Normal strength    Right Ankle/Foot   Dorsiflexion: 4+ (in limited AROM)  Plantar flexion: 4+  Inversion: 4+  Eversion: 4+    Ambulation     Ambulation: Stairs   Ascend stairs: independent  Pattern: reciprocal  Railings: one rail  Descend stairs: independent  Pattern: reciprocal  Railings: two rails    Comments   Pt ambulated with step through gait pattern, equal step lengths. Able to vary gait speed  well.     SLS-L 19 sec; R 9 sec  Tandem stance: 30 sec EO bilaterally  Mod tandem- 30 sec EO bilaterally ; EC RLE 9 sec; LLE EC 30 seconds  Narrow CONTRERAS EO/EC on level and unlevel surface: 30 sec each            Treatment/Procedures/Modalities:   Manual Therapy: 0 Minutes  Therapeutic Exercise: 28 Minutes   Neuromuscular Re-Education: 10 Minutes   Therapeutic Activity: 0 Minutes  Gait Trainin Minutes   Moist Heat:    Ice:    E-Stim:    Ultrasound:    Ionto:   Traction:      Timed Code Treatment: 38 Minutes  Total Treatment Time: 38 Minutes    Goals:   Progress toward previous goals: All Met   Recommendations: Continue as planned  Timeframe: 1 month  Prognosis to achieve goals: good    Assessment & Plan     Assessment  Impairments: impaired balance and impaired physical strength  Assessment details: Reassessment completed today for 67 yo female referred to PT following right nondisplaced posterior malleolar fracture and fibular fracture who demonstrates functional right ankle AROM. She denies recent ankle fatigue with prolonged walking and standing and is making steady progress with improved static and dynamic balance. She is independent with current HEP and appropriate to d/c to advanced HEP to conserve total visits.   Prognosis: good  Functional Limitations: walking and standing  Goals  Plan Goals: SHORT TERM GOALS: 3-4 weeks  1. Patient is independent with HEP for flexibility and strengthening. MET  2. Pt will demonstrate ability to ambulate with step through gait pattern in the clinic without antalgic gait. MET  3. Pt will demonstrate ability to perform single leg heel raise on the right without increase in pain. MET  4. Patient will demonstrate right ankle dorsiflexion to at least 14 deg. MET       LONG TERM GOALS:   6-8 weeks  1. Pt to demonstrate ability to perform full functional squat with good form and no increase in pain in the right foot/ankle. MET  2. Patient reports of ability to walk up/down steps  with reciprocal pattern, 1 handrail for support. MET  3. Pt to demonstrate ability to perform SLS on the right lower extremity for 30 seconds without LOB or increased pain. NOT MET  4. LEFS score is improved by at least 10 points to demonstrated improved functional mobility. MET      Plan  Treatment plan discussed with: patient  Plan details: Pt will be d/c from OPPT to home with current HEP. Discussed option for PT in upcoming months to further improve left hip strength. She continues to have difficulty with SLS activity and left hip weakness.             I certify that the therapy services are furnished while this patient is under my care.  The services outlined above are required by this patient, and will be reviewed every 90 days.    Corinne E. Perkins, PT  Physical Therapist    Please sign and return via fax to [phone].. Thank you, [facility] Physical Therapy.

## 2021-12-21 ENCOUNTER — OFFICE VISIT (OUTPATIENT)
Dept: INTERNAL MEDICINE | Facility: CLINIC | Age: 66
End: 2021-12-21

## 2021-12-21 VITALS
BODY MASS INDEX: 31.02 KG/M2 | WEIGHT: 193 LBS | HEART RATE: 76 BPM | OXYGEN SATURATION: 99 % | SYSTOLIC BLOOD PRESSURE: 122 MMHG | DIASTOLIC BLOOD PRESSURE: 80 MMHG | HEIGHT: 66 IN

## 2021-12-21 DIAGNOSIS — K57.90 DIVERTICULOSIS: ICD-10-CM

## 2021-12-21 DIAGNOSIS — D12.6 TUBULAR ADENOMA OF COLON: ICD-10-CM

## 2021-12-21 DIAGNOSIS — E78.5 HYPERLIPIDEMIA LDL GOAL <100: ICD-10-CM

## 2021-12-21 DIAGNOSIS — Z13.29 THYROID DISORDER SCREEN: ICD-10-CM

## 2021-12-21 DIAGNOSIS — E55.9 VITAMIN D DEFICIENCY: ICD-10-CM

## 2021-12-21 DIAGNOSIS — Z00.00 MEDICARE ANNUAL WELLNESS VISIT, SUBSEQUENT: Primary | ICD-10-CM

## 2021-12-21 PROBLEM — S82.831D CLOSED FRACTURE OF DISTAL END OF RIGHT FIBULA WITH ROUTINE HEALING: Status: RESOLVED | Noted: 2021-05-04 | Resolved: 2021-12-21

## 2021-12-21 PROCEDURE — 1170F FXNL STATUS ASSESSED: CPT | Performed by: INTERNAL MEDICINE

## 2021-12-21 PROCEDURE — G0439 PPPS, SUBSEQ VISIT: HCPCS | Performed by: INTERNAL MEDICINE

## 2021-12-21 PROCEDURE — 96160 PT-FOCUSED HLTH RISK ASSMT: CPT | Performed by: INTERNAL MEDICINE

## 2021-12-21 PROCEDURE — 1126F AMNT PAIN NOTED NONE PRSNT: CPT | Performed by: INTERNAL MEDICINE

## 2021-12-21 PROCEDURE — 1159F MED LIST DOCD IN RCRD: CPT | Performed by: INTERNAL MEDICINE

## 2021-12-21 PROCEDURE — 99214 OFFICE O/P EST MOD 30 MIN: CPT | Performed by: INTERNAL MEDICINE

## 2021-12-21 NOTE — PROGRESS NOTES
The ABCs of the Annual Wellness Visit  Subsequent Medicare Wellness Visit    Chief Complaint   Patient presents with   • Medicare Wellness-subsequent      Subjective    History of Present Illness:  Samantha Gaines is a 66 y.o. female who presents for a Subsequent Medicare Wellness Visit.    The following portions of the patient's history were reviewed and   updated as appropriate: allergies, current medications, past family history, past medical history, past social history, past surgical history and problem list.    Compared to one year ago, the patient feels her physical   health is the same.    Compared to one year ago, the patient feels her mental   health is the same.    Recent Hospitalizations:  She was not admitted to the hospital during the last year.       Current Medical Providers:  Patient Care Team:  Julia Figueroa DO as PCP - General  Julia Figueroa DO as PCP - Family Medicine    No outpatient medications prior to visit.     No facility-administered medications prior to visit.       No opioid medication identified on active medication list. I have reviewed chart for other potential  high risk medication/s and harmful drug interactions in the elderly.          Aspirin is not on active medication list.  Aspirin use is not indicated based on review of current medical condition/s. Risk of harm outweighs potential benefits.  .    Patient Active Problem List   Diagnosis   • Hyperlipidemia LDL goal <100   • Vitamin D deficiency   • Diverticulosis   • Tubular adenoma of colon     Advance Care Planning  Advance Directive is on file.  ACP discussion was held with the patient during this visit. Patient has an advance directive in EMR which is still valid.     Review of Systems   Constitutional: Negative for chills, diaphoresis, fatigue and fever.   HENT: Negative for sinus pressure and sneezing.    Respiratory: Negative for cough and shortness of breath.    Cardiovascular: Negative for chest pain and  "palpitations.   Gastrointestinal: Negative for constipation, diarrhea, nausea and vomiting.   Genitourinary: Negative for dysuria.   Neurological: Negative for weakness and light-headedness.   Psychiatric/Behavioral: Negative for sleep disturbance. The patient is not nervous/anxious.         Objective    Vitals:    12/21/21 1350   BP: 122/80   Pulse: 76   SpO2: 99%   Weight: 87.5 kg (193 lb)   Height: 167.6 cm (65.98\")   PainSc: 0-No pain     BMI Readings from Last 1 Encounters:   12/21/21 31.17 kg/m²   BMI is above normal parameters. Recommendations include: exercise counseling    Does the patient have evidence of cognitive impairment? No    Physical Exam  Vitals reviewed.   Cardiovascular:      Rate and Rhythm: Normal rate and regular rhythm.      Heart sounds: No murmur heard.      Pulmonary:      Effort: No respiratory distress.      Breath sounds: Normal breath sounds. No wheezing or rales.   Abdominal:      General: There is no distension.      Palpations: Abdomen is soft.      Tenderness: There is no abdominal tenderness. There is no guarding.   Musculoskeletal:      Right lower leg: No edema.      Left lower leg: No edema.   Neurological:      General: No focal deficit present.      Mental Status: She is alert and oriented to person, place, and time.   Psychiatric:         Mood and Affect: Mood normal.         Behavior: Behavior normal.                 HEALTH RISK ASSESSMENT    Smoking Status:  Social History     Tobacco Use   Smoking Status Never Smoker   Smokeless Tobacco Never Used     Alcohol Consumption:  Social History     Substance and Sexual Activity   Alcohol Use Yes   • Alcohol/week: 4.0 standard drinks   • Types: 4 Glasses of wine per week     Fall Risk Screen:    [facility] Fall Risk Assessment was completed, and patient is at HIGH risk for falls. Assessment completed on:12/21/2021    Depression Screening:  PHQ-2/PHQ-9 Depression Screening 12/21/2021   Little interest or pleasure in doing things 0 "   Feeling down, depressed, or hopeless 0   Total Score 0       Health Habits and Functional and Cognitive Screening:  Functional & Cognitive Status 12/21/2021   Do you have difficulty preparing food and eating? No   Do you have difficulty bathing yourself, getting dressed or grooming yourself? No   Do you have difficulty using the toilet? No   Do you have difficulty moving around from place to place? No   Do you have trouble with steps or getting out of a bed or a chair? No   Current Diet Well Balanced Diet   Dental Exam Up to date   Eye Exam Up to date   Exercise (times per week) 6 times per week   Current Exercises Include Walking   Current Exercise Activities Include -   Do you need help using the phone?  No   Are you deaf or do you have serious difficulty hearing?  No   Do you need help with transportation? No   Do you need help shopping? No   Do you need help preparing meals?  No   Do you need help with housework?  No   Do you need help with laundry? No   Do you need help taking your medications? No   Do you need help managing money? No   Do you ever drive or ride in a car without wearing a seat belt? No   Have you felt unusual stress, anger or loneliness in the last month? No   Who do you live with? Spouse   If you need help, do you have trouble finding someone available to you? No   Have you been bothered in the last four weeks by sexual problems? No   Do you have difficulty concentrating, remembering or making decisions? No       Age-appropriate Screening Schedule:  Refer to the list below for future screening recommendations based on patient's age, sex and/or medical conditions. Orders for these recommended tests are listed in the plan section. The patient has been provided with a written plan.    Health Maintenance   Topic Date Due   • TDAP/TD VACCINES (1 - Tdap) Never done   • ZOSTER VACCINE (2 of 2) 03/31/2014   • LIPID PANEL  12/10/2021   • DXA SCAN  03/12/2022   • MAMMOGRAM  06/09/2023   • INFLUENZA  VACCINE  Completed   • PAP SMEAR  Discontinued              Assessment/Plan   CMS Preventative Services Quick Reference  Risk Factors Identified During Encounter  Fall Risk-High or Moderate  The above risks/problems have been discussed with the patient.  Follow up actions/plans if indicated are seen below in the Assessment/Plan Section.  Pertinent information has been shared with the patient in the After Visit Summary.    Diagnoses and all orders for this visit:    1. Medicare annual wellness visit, subsequent (Primary)  -     CBC & Differential; Future    2. Hyperlipidemia LDL goal <100  -     Comprehensive Metabolic Panel; Future  -     Lipid Panel; Future  Continue with low carb diet  3. Vitamin D deficiency  -     Vitamin D 25 Hydroxy; Future  Continue with vitamin d3 1,000 IU daily  4. Tubular adenoma of colon  Last colonoscopy in 2020 next in 2027  5. Diverticulosis  High fiber diet  6. Thyroid disorder screen  -     TSH; Future        Follow Up:   Return in about 6 months (around 6/21/2022).     An After Visit Summary and PPPS were made available to the patient.

## 2022-01-05 ENCOUNTER — LAB (OUTPATIENT)
Dept: LAB | Facility: HOSPITAL | Age: 67
End: 2022-01-05

## 2022-01-05 DIAGNOSIS — Z13.29 THYROID DISORDER SCREEN: ICD-10-CM

## 2022-01-05 DIAGNOSIS — E78.5 HYPERLIPIDEMIA LDL GOAL <100: ICD-10-CM

## 2022-01-05 DIAGNOSIS — E55.9 VITAMIN D DEFICIENCY: ICD-10-CM

## 2022-01-05 DIAGNOSIS — Z00.00 MEDICARE ANNUAL WELLNESS VISIT, SUBSEQUENT: ICD-10-CM

## 2022-01-05 LAB
25(OH)D3+25(OH)D2 SERPL-MCNC: 51.2 NG/ML (ref 30–100)
ALBUMIN SERPL-MCNC: 4.8 G/DL (ref 3.5–5.2)
ALBUMIN/GLOB SERPL: 3 G/DL
ALP SERPL-CCNC: 78 U/L (ref 39–117)
ALT SERPL-CCNC: 17 U/L (ref 1–33)
AST SERPL-CCNC: 19 U/L (ref 1–32)
BASOPHILS # BLD AUTO: 0.08 10*3/MM3 (ref 0–0.2)
BASOPHILS NFR BLD AUTO: 1.2 % (ref 0–1.5)
BILIRUB SERPL-MCNC: 0.6 MG/DL (ref 0–1.2)
BUN SERPL-MCNC: 18 MG/DL (ref 8–23)
BUN/CREAT SERPL: 24.7 (ref 7–25)
CALCIUM SERPL-MCNC: 9.4 MG/DL (ref 8.6–10.5)
CHLORIDE SERPL-SCNC: 105 MMOL/L (ref 98–107)
CHOLEST SERPL-MCNC: 271 MG/DL (ref 0–200)
CO2 SERPL-SCNC: 26.9 MMOL/L (ref 22–29)
CREAT SERPL-MCNC: 0.73 MG/DL (ref 0.57–1)
EOSINOPHIL # BLD AUTO: 0.19 10*3/MM3 (ref 0–0.4)
EOSINOPHIL NFR BLD AUTO: 2.9 % (ref 0.3–6.2)
ERYTHROCYTE [DISTWIDTH] IN BLOOD BY AUTOMATED COUNT: 12.4 % (ref 12.3–15.4)
GLOBULIN SER CALC-MCNC: 1.6 GM/DL
GLUCOSE SERPL-MCNC: 90 MG/DL (ref 65–99)
HCT VFR BLD AUTO: 44.6 % (ref 34–46.6)
HDLC SERPL-MCNC: 63 MG/DL (ref 40–60)
HGB BLD-MCNC: 15 G/DL (ref 12–15.9)
IMM GRANULOCYTES # BLD AUTO: 0.02 10*3/MM3 (ref 0–0.05)
IMM GRANULOCYTES NFR BLD AUTO: 0.3 % (ref 0–0.5)
LDLC SERPL CALC-MCNC: 183 MG/DL (ref 0–100)
LYMPHOCYTES # BLD AUTO: 1.8 10*3/MM3 (ref 0.7–3.1)
LYMPHOCYTES NFR BLD AUTO: 27.2 % (ref 19.6–45.3)
MCH RBC QN AUTO: 32.4 PG (ref 26.6–33)
MCHC RBC AUTO-ENTMCNC: 33.6 G/DL (ref 31.5–35.7)
MCV RBC AUTO: 96.3 FL (ref 79–97)
MONOCYTES # BLD AUTO: 0.57 10*3/MM3 (ref 0.1–0.9)
MONOCYTES NFR BLD AUTO: 8.6 % (ref 5–12)
NEUTROPHILS # BLD AUTO: 3.96 10*3/MM3 (ref 1.7–7)
NEUTROPHILS NFR BLD AUTO: 59.8 % (ref 42.7–76)
NRBC BLD AUTO-RTO: 0.2 /100 WBC (ref 0–0.2)
PLATELET # BLD AUTO: 294 10*3/MM3 (ref 140–450)
POTASSIUM SERPL-SCNC: 4.7 MMOL/L (ref 3.5–5.2)
PROT SERPL-MCNC: 6.4 G/DL (ref 6–8.5)
RBC # BLD AUTO: 4.63 10*6/MM3 (ref 3.77–5.28)
SODIUM SERPL-SCNC: 141 MMOL/L (ref 136–145)
TRIGL SERPL-MCNC: 141 MG/DL (ref 0–150)
TSH SERPL DL<=0.005 MIU/L-ACNC: 3 UIU/ML (ref 0.27–4.2)
VLDLC SERPL CALC-MCNC: 25 MG/DL (ref 5–40)
WBC # BLD AUTO: 6.62 10*3/MM3 (ref 3.4–10.8)

## 2022-01-06 ENCOUNTER — TELEPHONE (OUTPATIENT)
Dept: INTERNAL MEDICINE | Facility: CLINIC | Age: 67
End: 2022-01-06

## 2022-01-06 NOTE — TELEPHONE ENCOUNTER
----- Message from Julia Figueroa DO sent at 1/6/2022  8:24 AM EST -----  Cholesterol high. Have her do low cholesterol diet x4 months and office visit fasting 4m to recheck

## 2022-01-06 NOTE — TELEPHONE ENCOUNTER
Left message for patient to return call to office regarding lab results.  Office Phone number given

## 2022-01-10 DIAGNOSIS — E78.5 HYPERLIPIDEMIA LDL GOAL <100: Primary | ICD-10-CM

## 2022-04-29 ENCOUNTER — LAB (OUTPATIENT)
Dept: LAB | Facility: HOSPITAL | Age: 67
End: 2022-04-29

## 2022-04-29 DIAGNOSIS — E78.5 HYPERLIPIDEMIA LDL GOAL <100: ICD-10-CM

## 2022-04-29 LAB
ALBUMIN SERPL-MCNC: 4.2 G/DL (ref 3.5–5.2)
ALBUMIN/GLOB SERPL: 1.9 G/DL
ALP SERPL-CCNC: 71 U/L (ref 39–117)
ALT SERPL-CCNC: 18 U/L (ref 1–33)
AST SERPL-CCNC: 17 U/L (ref 1–32)
BILIRUB SERPL-MCNC: 0.7 MG/DL (ref 0–1.2)
BUN SERPL-MCNC: 17 MG/DL (ref 8–23)
BUN/CREAT SERPL: 22.7 (ref 7–25)
CALCIUM SERPL-MCNC: 9.4 MG/DL (ref 8.6–10.5)
CHLORIDE SERPL-SCNC: 105 MMOL/L (ref 98–107)
CHOLEST SERPL-MCNC: 283 MG/DL (ref 0–200)
CO2 SERPL-SCNC: 26.4 MMOL/L (ref 22–29)
CREAT SERPL-MCNC: 0.75 MG/DL (ref 0.57–1)
EGFRCR SERPLBLD CKD-EPI 2021: 86.3 ML/MIN/1.73
GLOBULIN SER CALC-MCNC: 2.2 GM/DL
GLUCOSE SERPL-MCNC: 88 MG/DL (ref 65–99)
HDLC SERPL-MCNC: 65 MG/DL (ref 40–60)
LDLC SERPL CALC-MCNC: 188 MG/DL (ref 0–100)
POTASSIUM SERPL-SCNC: 4.6 MMOL/L (ref 3.5–5.2)
PROT SERPL-MCNC: 6.4 G/DL (ref 6–8.5)
SODIUM SERPL-SCNC: 143 MMOL/L (ref 136–145)
TRIGL SERPL-MCNC: 165 MG/DL (ref 0–150)
VLDLC SERPL CALC-MCNC: 30 MG/DL (ref 5–40)

## 2022-05-03 ENCOUNTER — OFFICE VISIT (OUTPATIENT)
Dept: INTERNAL MEDICINE | Facility: CLINIC | Age: 67
End: 2022-05-03

## 2022-05-03 VITALS
HEART RATE: 83 BPM | SYSTOLIC BLOOD PRESSURE: 130 MMHG | DIASTOLIC BLOOD PRESSURE: 80 MMHG | WEIGHT: 196 LBS | OXYGEN SATURATION: 92 % | TEMPERATURE: 97.3 F | BODY MASS INDEX: 31.65 KG/M2

## 2022-05-03 DIAGNOSIS — E78.5 HYPERLIPIDEMIA LDL GOAL <100: Primary | ICD-10-CM

## 2022-05-03 PROCEDURE — 99213 OFFICE O/P EST LOW 20 MIN: CPT | Performed by: INTERNAL MEDICINE

## 2022-05-03 RX ORDER — ATORVASTATIN CALCIUM 10 MG/1
10 TABLET, FILM COATED ORAL DAILY
Qty: 30 TABLET | Refills: 2 | Status: SHIPPED | OUTPATIENT
Start: 2022-05-03 | End: 2022-08-09 | Stop reason: SDUPTHER

## 2022-05-03 NOTE — PROGRESS NOTES
"Subjective   Samantha Gaines is a 67 y.o. female.   Chief Complaint   Patient presents with   • Follow-up     F/u cholesterol          History of Present Illness   Here for f/u on HLP.  Currently not on medication. Discussed her ACC risk score is 6.5% and I would recommend a statin. She agrees.   The following portions of the patient's history were reviewed and updated as appropriate: allergies, current medications, past family history, past medical history, past social history, past surgical history and problem list.  Past Medical History:   Diagnosis Date   • Breast cyst    • Closed fracture of distal end of right fibula with routine healing 5/4/2021   • Diverticulitis of colon 9/6/2016   • Diverticulosis    • Essential hypertriglyceridemia    • Incisional hernia 12/27/2016   • PONV (postoperative nausea and vomiting)    • Tubular adenoma of colon 12/10/2020   • Vitamin D deficiency     \"HAD BLOODWORK RECENTLY AND MY DOCTOR DIDN'T SAY ANYTHING ABOUT THIS AT THAT TIME\"     Past Surgical History:   Procedure Laterality Date   • BREAST BIOPSY     • BREAST CYST EXCISION      PT UNSURE OF LATERALITY   • COLONOSCOPY  UNKNOWN   • DIAGNOSTIC LAPAROSCOPY     • HYSTERECTOMY     • OOPHORECTOMY     • VENTRAL/INCISIONAL HERNIA REPAIR N/A 12/27/2016    Procedure: OPEN INCISIONAL HERNIA REPAIR WITH MESH;  Surgeon: Maximino Sparks MD;  Location: [facility];  Service:      Family History   Problem Relation Age of Onset   • Diabetes Other    • Heart disease Other    • Hypertension Other    • Heart attack Mother    • Diabetes Father    • Heart attack Father    • Breast cancer Neg Hx    • Ovarian cancer Neg Hx      Social History     Socioeconomic History   • Marital status:    Tobacco Use   • Smoking status: Never Smoker   • Smokeless tobacco: Never Used   Substance and Sexual Activity   • Alcohol use: Yes     Alcohol/week: 4.0 standard drinks     Types: 4 Glasses of wine per week   • Drug use: No   • Sexual activity: Defer "     No current outpatient medications on file prior to visit.     No current facility-administered medications on file prior to visit.       Review of Systems   Constitutional: Negative for fatigue and fever.   Respiratory: Negative for cough and shortness of breath.    Cardiovascular: Negative for chest pain and leg swelling.   Gastrointestinal: Negative for diarrhea, nausea and vomiting.   Psychiatric/Behavioral: Negative for hallucinations and self-injury. The patient is not nervous/anxious.      /80   Pulse 83   Temp 97.3 °F (36.3 °C)   Wt 88.9 kg (196 lb)   SpO2 92%   BMI 31.65 kg/m²     Objective   Physical Exam  Vitals reviewed.   Cardiovascular:      Rate and Rhythm: Normal rate and regular rhythm.      Heart sounds: No murmur heard.  Pulmonary:      Effort: No respiratory distress.      Breath sounds: No wheezing.   Neurological:      General: No focal deficit present.      Mental Status: She is alert and oriented to person, place, and time.   Psychiatric:         Mood and Affect: Mood normal.         Behavior: Behavior normal.         Assessment/Plan   Diagnoses and all orders for this visit:    1. Hyperlipidemia LDL goal <100 (Primary)  -     atorvastatin (LIPITOR) 10 MG tablet; Take 1 tablet by mouth Daily.  Dispense: 30 tablet; Refill: 2  -     Comprehensive Metabolic Panel; Future  cmp 6 weeks after starting statin.

## 2022-06-16 ENCOUNTER — LAB (OUTPATIENT)
Dept: LAB | Facility: HOSPITAL | Age: 67
End: 2022-06-16

## 2022-06-16 DIAGNOSIS — E78.5 HYPERLIPIDEMIA LDL GOAL <100: ICD-10-CM

## 2022-06-16 LAB
ALBUMIN SERPL-MCNC: 4.2 G/DL (ref 3.5–5.2)
ALBUMIN/GLOB SERPL: 1.8 G/DL
ALP SERPL-CCNC: 69 U/L (ref 39–117)
ALT SERPL-CCNC: 22 U/L (ref 1–33)
AST SERPL-CCNC: 22 U/L (ref 1–32)
BILIRUB SERPL-MCNC: 0.6 MG/DL (ref 0–1.2)
BUN SERPL-MCNC: 13 MG/DL (ref 8–23)
BUN/CREAT SERPL: 18.3 (ref 7–25)
CALCIUM SERPL-MCNC: 9.3 MG/DL (ref 8.6–10.5)
CHLORIDE SERPL-SCNC: 106 MMOL/L (ref 98–107)
CO2 SERPL-SCNC: 25.4 MMOL/L (ref 22–29)
CREAT SERPL-MCNC: 0.71 MG/DL (ref 0.57–1)
EGFRCR SERPLBLD CKD-EPI 2021: 92.2 ML/MIN/1.73
GLOBULIN SER CALC-MCNC: 2.4 GM/DL
GLUCOSE SERPL-MCNC: 87 MG/DL (ref 65–99)
POTASSIUM SERPL-SCNC: 4.7 MMOL/L (ref 3.5–5.2)
PROT SERPL-MCNC: 6.6 G/DL (ref 6–8.5)
SODIUM SERPL-SCNC: 143 MMOL/L (ref 136–145)

## 2022-07-27 ENCOUNTER — TRANSCRIBE ORDERS (OUTPATIENT)
Dept: ADMINISTRATIVE | Facility: HOSPITAL | Age: 67
End: 2022-07-27

## 2022-07-27 DIAGNOSIS — Z12.31 VISIT FOR SCREENING MAMMOGRAM: Primary | ICD-10-CM

## 2022-08-03 ENCOUNTER — OFFICE VISIT (OUTPATIENT)
Dept: INTERNAL MEDICINE | Facility: CLINIC | Age: 67
End: 2022-08-03

## 2022-08-03 VITALS
HEIGHT: 66 IN | BODY MASS INDEX: 31.02 KG/M2 | SYSTOLIC BLOOD PRESSURE: 128 MMHG | DIASTOLIC BLOOD PRESSURE: 80 MMHG | TEMPERATURE: 98.4 F | HEART RATE: 81 BPM | WEIGHT: 193 LBS | OXYGEN SATURATION: 96 %

## 2022-08-03 DIAGNOSIS — E78.5 HYPERLIPIDEMIA LDL GOAL <100: Primary | ICD-10-CM

## 2022-08-03 PROCEDURE — 99213 OFFICE O/P EST LOW 20 MIN: CPT | Performed by: INTERNAL MEDICINE

## 2022-08-03 NOTE — PROGRESS NOTES
"Subjective   Samantha Gaines is a 67 y.o. female.   Chief Complaint   Patient presents with   • Hyperlipidemia       History of Present Illness   F/u on HLP. Started on Lipitor and tolerating medication. No CP or SOA. No HA.   The following portions of the patient's history were reviewed and updated as appropriate: allergies, current medications, past family history, past medical history, past social history, past surgical history and problem list.  Past Medical History:   Diagnosis Date   • Breast cyst    • Closed fracture of distal end of right fibula with routine healing 5/4/2021   • Diverticulitis of colon 9/6/2016   • Diverticulosis    • Essential hypertriglyceridemia    • Incisional hernia 12/27/2016   • PONV (postoperative nausea and vomiting)    • Tubular adenoma of colon 12/10/2020   • Vitamin D deficiency     \"HAD BLOODWORK RECENTLY AND MY DOCTOR DIDN'T SAY ANYTHING ABOUT THIS AT THAT TIME\"     Past Surgical History:   Procedure Laterality Date   • BREAST BIOPSY     • BREAST CYST EXCISION      PT UNSURE OF LATERALITY   • COLONOSCOPY  UNKNOWN   • DIAGNOSTIC LAPAROSCOPY     • HYSTERECTOMY     • OOPHORECTOMY     • VENTRAL/INCISIONAL HERNIA REPAIR N/A 12/27/2016    Procedure: OPEN INCISIONAL HERNIA REPAIR WITH MESH;  Surgeon: Maximino Sparks MD;  [facility];  Service:      Family History   Problem Relation Age of Onset   • Diabetes Other    • Heart disease Other    • Hypertension Other    • Heart attack Mother    • Diabetes Father    • Heart attack Father    • Breast cancer Neg Hx    • Ovarian cancer Neg Hx      Social History     Socioeconomic History   • Marital status:    Tobacco Use   • Smoking status: Never Smoker   • Smokeless tobacco: Never Used   Substance and Sexual Activity   • Alcohol use: Yes     Alcohol/week: 4.0 standard drinks     Types: 4 Glasses of wine per week   • Drug use: No   • Sexual activity: Defer     Current Outpatient Medications on File Prior to Visit   Medication " "Sig Dispense Refill   • atorvastatin (LIPITOR) 10 MG tablet Take 1 tablet by mouth Daily. 30 tablet 2     No current facility-administered medications on file prior to visit.       Review of Systems   Constitutional: Negative for diaphoresis, fatigue and fever.   Respiratory: Negative for cough and shortness of breath.    Cardiovascular: Negative for chest pain and leg swelling.   Gastrointestinal: Negative for diarrhea, nausea and vomiting.   Psychiatric/Behavioral: Negative for confusion and hallucinations.     /80   Pulse 81   Temp 98.4 °F (36.9 °C)   Ht 167.6 cm (65.98\")   Wt 87.5 kg (193 lb)   SpO2 96%   BMI 31.17 kg/m²     Objective   Physical Exam  Vitals reviewed.   Cardiovascular:      Rate and Rhythm: Normal rate and regular rhythm.      Heart sounds: No murmur heard.  Pulmonary:      Effort: No respiratory distress.      Breath sounds: No wheezing or rales.   Neurological:      Mental Status: She is alert and oriented to person, place, and time.   Psychiatric:         Mood and Affect: Mood normal.         Behavior: Behavior normal.         Assessment & Plan   Diagnoses and all orders for this visit:    1. Hyperlipidemia LDL goal <100 (Primary)  -     Comprehensive Metabolic Panel; Future  -     Lipid Panel; Future  Continue Lipitor 10 mg po qhs             "

## 2022-08-04 ENCOUNTER — LAB (OUTPATIENT)
Dept: LAB | Facility: HOSPITAL | Age: 67
End: 2022-08-04

## 2022-08-04 DIAGNOSIS — E78.5 HYPERLIPIDEMIA LDL GOAL <100: ICD-10-CM

## 2022-08-04 LAB
ALBUMIN SERPL-MCNC: 4.5 G/DL (ref 3.5–5.2)
ALBUMIN/GLOB SERPL: 3 G/DL
ALP SERPL-CCNC: 70 U/L (ref 39–117)
ALT SERPL-CCNC: 24 U/L (ref 1–33)
AST SERPL-CCNC: 23 U/L (ref 1–32)
BILIRUB SERPL-MCNC: 0.6 MG/DL (ref 0–1.2)
BUN SERPL-MCNC: 13 MG/DL (ref 8–23)
BUN/CREAT SERPL: 20.3 (ref 7–25)
CALCIUM SERPL-MCNC: 9.1 MG/DL (ref 8.6–10.5)
CHLORIDE SERPL-SCNC: 107 MMOL/L (ref 98–107)
CHOLEST SERPL-MCNC: 178 MG/DL (ref 0–200)
CO2 SERPL-SCNC: 25.1 MMOL/L (ref 22–29)
CREAT SERPL-MCNC: 0.64 MG/DL (ref 0.57–1)
EGFRCR SERPLBLD CKD-EPI 2021: 95.8 ML/MIN/1.73
GLOBULIN SER CALC-MCNC: 1.5 GM/DL
GLUCOSE SERPL-MCNC: 79 MG/DL (ref 65–99)
HDLC SERPL-MCNC: 70 MG/DL (ref 40–60)
LDLC SERPL CALC-MCNC: 85 MG/DL (ref 0–100)
POTASSIUM SERPL-SCNC: 4.5 MMOL/L (ref 3.5–5.2)
PROT SERPL-MCNC: 6 G/DL (ref 6–8.5)
SODIUM SERPL-SCNC: 144 MMOL/L (ref 136–145)
TRIGL SERPL-MCNC: 136 MG/DL (ref 0–150)
VLDLC SERPL CALC-MCNC: 23 MG/DL (ref 5–40)

## 2022-08-09 ENCOUNTER — TELEPHONE (OUTPATIENT)
Dept: INTERNAL MEDICINE | Facility: CLINIC | Age: 67
End: 2022-08-09

## 2022-08-09 DIAGNOSIS — E78.5 HYPERLIPIDEMIA LDL GOAL <100: ICD-10-CM

## 2022-08-09 RX ORDER — ATORVASTATIN CALCIUM 10 MG/1
10 TABLET, FILM COATED ORAL DAILY
Qty: 90 TABLET | Refills: 1 | Status: SHIPPED | OUTPATIENT
Start: 2022-08-09 | End: 2023-02-20 | Stop reason: SDUPTHER

## 2022-08-09 NOTE — TELEPHONE ENCOUNTER
Spoke with patient and advised of rx for Paxlovid sent to pharmacy and informed to DC Lipitor while on Paxlovid.  Verbalized understanding.   VV 8/09/2021 @ 9:40am

## 2022-08-09 NOTE — TELEPHONE ENCOUNTER
Caller: Samantha Gaines    Relationship to patient: Self    Best call back number: [phone]     Date of positive COVID19 test: 8-9-22    COVID19 symptoms: SNEEZING, RUNNY NOSE, SWOLLEN LYMPH NODES    Additional information or concerns: PATIENT ASKED IF DR MCGHEE RECOMMEND TAKING PAXLOVID.       Requested Prescriptions:   Requested Prescriptions     Pending Prescriptions Disp Refills   • atorvastatin (LIPITOR) 10 MG tablet 30 tablet 2     Sig: Take 1 tablet by mouth Daily.        Pharmacy where request should be sent: [facility] [address] AT [facility] [address] - [phone]  - [phone]     Additional details provided by patient: PATIENT IS OUT OF MEDICATION.  PATIENT ASKED FOR A 90 DAY.     Does the patient have less than a 3 day supply:  [x] Yes  [] No    Dominik Orourke Rep   08/09/22 09:11 EDT

## 2022-08-23 ENCOUNTER — HOSPITAL ENCOUNTER (OUTPATIENT)
Dept: MAMMOGRAPHY | Facility: HOSPITAL | Age: 67
Discharge: HOME | End: 2022-08-23
Admitting: INTERNAL MEDICINE

## 2022-08-23 DIAGNOSIS — Z12.31 VISIT FOR SCREENING MAMMOGRAM: ICD-10-CM

## 2022-08-23 PROCEDURE — 77067 SCR MAMMO BI INCL CAD: CPT

## 2022-08-23 PROCEDURE — 77063 BREAST TOMOSYNTHESIS BI: CPT | Performed by: RADIOLOGY

## 2022-08-23 PROCEDURE — 77063 BREAST TOMOSYNTHESIS BI: CPT

## 2022-08-23 PROCEDURE — 77067 SCR MAMMO BI INCL CAD: CPT | Performed by: RADIOLOGY

## 2023-01-04 ENCOUNTER — OFFICE VISIT (OUTPATIENT)
Dept: INTERNAL MEDICINE | Facility: CLINIC | Age: 68
End: 2023-01-04
Payer: MEDICARE

## 2023-01-04 VITALS
DIASTOLIC BLOOD PRESSURE: 84 MMHG | BODY MASS INDEX: 33.27 KG/M2 | OXYGEN SATURATION: 98 % | TEMPERATURE: 97.9 F | WEIGHT: 207 LBS | HEART RATE: 70 BPM | HEIGHT: 66 IN | SYSTOLIC BLOOD PRESSURE: 124 MMHG

## 2023-01-04 DIAGNOSIS — Z78.0 POSTMENOPAUSE: ICD-10-CM

## 2023-01-04 DIAGNOSIS — E78.5 HYPERLIPIDEMIA LDL GOAL <100: ICD-10-CM

## 2023-01-04 DIAGNOSIS — Z13.29 THYROID DISORDER SCREEN: ICD-10-CM

## 2023-01-04 DIAGNOSIS — Z00.00 MEDICARE ANNUAL WELLNESS VISIT, SUBSEQUENT: Primary | ICD-10-CM

## 2023-01-04 PROCEDURE — 1160F RVW MEDS BY RX/DR IN RCRD: CPT | Performed by: INTERNAL MEDICINE

## 2023-01-04 PROCEDURE — 1159F MED LIST DOCD IN RCRD: CPT | Performed by: INTERNAL MEDICINE

## 2023-01-04 PROCEDURE — 96160 PT-FOCUSED HLTH RISK ASSMT: CPT | Performed by: INTERNAL MEDICINE

## 2023-01-04 PROCEDURE — G0439 PPPS, SUBSEQ VISIT: HCPCS | Performed by: INTERNAL MEDICINE

## 2023-01-04 PROCEDURE — 1126F AMNT PAIN NOTED NONE PRSNT: CPT | Performed by: INTERNAL MEDICINE

## 2023-01-04 PROCEDURE — 1170F FXNL STATUS ASSESSED: CPT | Performed by: INTERNAL MEDICINE

## 2023-01-04 RX ORDER — CHOLECALCIFEROL (VITAMIN D3) 50 MCG
2000 TABLET ORAL DAILY
COMMUNITY

## 2023-01-04 RX ORDER — BIOTIN 10 MG
TABLET ORAL
COMMUNITY

## 2023-01-04 NOTE — PROGRESS NOTES
The ABCs of the Annual Wellness Visit  Subsequent Medicare Wellness Visit    Subjective      Samantha Gaines is a 67 y.o. female who presents for a Subsequent Medicare Wellness Visit.    The following portions of the patient's history were reviewed and   updated as appropriate: allergies, current medications, past family history, past medical history, past social history, past surgical history and problem list.    Compared to one year ago, the patient feels her physical   health is the same.    Compared to one year ago, the patient feels her mental   health is the same.    Recent Hospitalizations:  She was not admitted to the hospital during the last year.       Current Medical Providers:  Patient Care Team:  Julia Figueroa DO as PCP - General  Julia Figueroa DO as PCP - Family Medicine    Outpatient Medications Prior to Visit   Medication Sig Dispense Refill   • atorvastatin (LIPITOR) 10 MG tablet Take 1 tablet by mouth Daily. 90 tablet 1   • Biotin 10 MG tablet Take  by mouth.     • Cholecalciferol (Vitamin D) 50 MCG (2000 UT) tablet Take 2,000 Units by mouth Daily.       No facility-administered medications prior to visit.       No opioid medication identified on active medication list. I have reviewed chart for other potential  high risk medication/s and harmful drug interactions in the elderly.          Aspirin is not on active medication list.  Aspirin use is not indicated based on review of current medical condition/s. Risk of harm outweighs potential benefits.  .    Patient Active Problem List   Diagnosis   • Hyperlipidemia LDL goal <100   • Vitamin D deficiency   • Diverticulosis   • Tubular adenoma of colon     Advance Care Planning  Advance Directive is on file.  ACP discussion was held with the patient during this visit. Patient has an advance directive in EMR which is still valid.      Objective    Vitals:    01/04/23 1509   BP: 124/84   Pulse: 70   Temp: 97.9 °F (36.6 °C)   SpO2: 98%   Weight:  93.9 kg (207 lb)   Height: 167.6 cm (65.98\")   PainSc: 0-No pain     Estimated body mass index is 33.43 kg/m² as calculated from the following:    Height as of this encounter: 167.6 cm (65.98\").    Weight as of this encounter: 93.9 kg (207 lb).    BMI is >= 30 and <35. (Class 1 Obesity). The following options were offered after discussion;: exercise counseling/recommendations      Does the patient have evidence of cognitive impairment?   No          Review of Systems - Negative   Physical Exam  Vitals reviewed.   Cardiovascular:      Rate and Rhythm: Normal rate and regular rhythm.   Pulmonary:      Effort: No respiratory distress.      Breath sounds: No wheezing.   Musculoskeletal:      Right lower leg: No edema.      Left lower leg: No edema.   Neurological:      Mental Status: She is alert and oriented to person, place, and time.   Psychiatric:         Mood and Affect: Mood normal.         Behavior: Behavior normal.           HEALTH RISK ASSESSMENT    Smoking Status:  Social History     Tobacco Use   Smoking Status Never   Smokeless Tobacco Never     Alcohol Consumption:  Social History     Substance and Sexual Activity   Alcohol Use Yes   • Alcohol/week: 4.0 standard drinks   • Types: 4 Glasses of wine per week     Fall Risk Screen:    [facility], and patient is at MODERATE risk for falls. Assessment completed on:1/4/2023    Depression Screening:  PHQ-2/PHQ-9 Depression Screening 1/4/2023   Little Interest or Pleasure in Doing Things 0-->not at all   Feeling Down, Depressed or Hopeless 0-->not at all   PHQ-9: Brief Depression Severity Measure Score 0       Health Habits and Functional and Cognitive Screening:  Functional & Cognitive Status 1/4/2023   Do you have difficulty preparing food and eating? No   Do you have difficulty bathing yourself, getting dressed or grooming yourself? No   Do you have difficulty using the toilet? No   Do you have difficulty moving around from place to  place? No   Do you have trouble with steps or getting out of a bed or a chair? No   Current Diet Well Balanced Diet   Dental Exam Up to date   Eye Exam Up to date   Exercise (times per week) 5 times per week   Current Exercises Include Walking   Current Exercise Activities Include -   Do you need help using the phone?  No   Are you deaf or do you have serious difficulty hearing?  No   Do you need help with transportation? No   Do you need help shopping? No   Do you need help preparing meals?  No   Do you need help with housework?  No   Do you need help with laundry? No   Do you need help taking your medications? No   Do you need help managing money? No   Do you ever drive or ride in a car without wearing a seat belt? No   Have you felt unusual stress, anger or loneliness in the last month? No   Who do you live with? Spouse   If you need help, do you have trouble finding someone available to you? No   Have you been bothered in the last four weeks by sexual problems? No   Do you have difficulty concentrating, remembering or making decisions? No       Age-appropriate Screening Schedule:  Refer to the list below for future screening recommendations based on patient's age, sex and/or medical conditions. Orders for these recommended tests are listed in the plan section. The patient has been provided with a written plan.    Health Maintenance   Topic Date Due   • TDAP/TD VACCINES (1 - Tdap) Never done   • ZOSTER VACCINE (2 of 2) 03/31/2014   • DXA SCAN  03/12/2022   • LIPID PANEL  08/04/2023   • MAMMOGRAM  08/23/2024   • INFLUENZA VACCINE  Completed   • PAP SMEAR  Discontinued                CMS Preventative Services Quick Reference  Risk Factors Identified During Encounter:    Fall Risk-High or Moderate: Discussed Fall Prevention in the home    The above risks/problems have been discussed with the patient.  Pertinent information has been shared with the patient in the After Visit Summary.    Diagnoses and all orders for  this visit:    1. Medicare annual wellness visit, subsequent (Primary)  -     CBC & Differential; Future    2. Hyperlipidemia LDL goal <100  -     Comprehensive Metabolic Panel; Future  -     Lipid Panel; Future  Continue Lipitor 10 mg po qhs  3. Thyroid disorder screen  -     TSH; Future    4. Postmenopause  -     DEXA Bone Density Axial; Future        Follow Up:   Next Medicare Wellness visit to be scheduled in 1 year.      An After Visit Summary and PPPS were made available to the patient.

## 2023-01-12 ENCOUNTER — HOSPITAL ENCOUNTER (OUTPATIENT)
Dept: BONE DENSITY | Facility: HOSPITAL | Age: 68
Discharge: HOME | End: 2023-01-12
Admitting: INTERNAL MEDICINE
Payer: MEDICARE

## 2023-01-12 DIAGNOSIS — Z78.0 POSTMENOPAUSE: ICD-10-CM

## 2023-01-12 PROCEDURE — 77080 DXA BONE DENSITY AXIAL: CPT

## 2023-01-30 ENCOUNTER — LAB (OUTPATIENT)
Dept: LAB | Facility: HOSPITAL | Age: 68
End: 2023-01-30
Payer: MEDICARE

## 2023-01-30 DIAGNOSIS — Z13.29 THYROID DISORDER SCREEN: ICD-10-CM

## 2023-01-30 DIAGNOSIS — E78.5 HYPERLIPIDEMIA LDL GOAL <100: ICD-10-CM

## 2023-01-30 DIAGNOSIS — Z00.00 MEDICARE ANNUAL WELLNESS VISIT, SUBSEQUENT: ICD-10-CM

## 2023-01-30 LAB
ALBUMIN SERPL-MCNC: 4.3 G/DL (ref 3.5–5.2)
ALBUMIN/GLOB SERPL: 2.2 G/DL
ALP SERPL-CCNC: 75 U/L (ref 39–117)
ALT SERPL W P-5'-P-CCNC: 17 U/L (ref 1–33)
ANION GAP SERPL CALCULATED.3IONS-SCNC: 8 MMOL/L (ref 5–15)
AST SERPL-CCNC: 19 U/L (ref 1–32)
BASOPHILS # BLD AUTO: 0.09 10*3/MM3 (ref 0–0.2)
BASOPHILS NFR BLD AUTO: 1.1 % (ref 0–1.5)
BILIRUB SERPL-MCNC: 0.5 MG/DL (ref 0–1.2)
BUN SERPL-MCNC: 11 MG/DL (ref 8–23)
BUN/CREAT SERPL: 14.1 (ref 7–25)
CALCIUM SPEC-SCNC: 9.3 MG/DL (ref 8.6–10.5)
CHLORIDE SERPL-SCNC: 104 MMOL/L (ref 98–107)
CHOLEST SERPL-MCNC: 159 MG/DL (ref 0–200)
CO2 SERPL-SCNC: 26 MMOL/L (ref 22–29)
CREAT SERPL-MCNC: 0.78 MG/DL (ref 0.57–1)
DEPRECATED RDW RBC AUTO: 42.1 FL (ref 37–54)
EGFRCR SERPLBLD CKD-EPI 2021: 82.3 ML/MIN/1.73
EOSINOPHIL # BLD AUTO: 0.34 10*3/MM3 (ref 0–0.4)
EOSINOPHIL NFR BLD AUTO: 4.2 % (ref 0.3–6.2)
ERYTHROCYTE [DISTWIDTH] IN BLOOD BY AUTOMATED COUNT: 12.2 % (ref 12.3–15.4)
GLOBULIN UR ELPH-MCNC: 2 GM/DL
GLUCOSE SERPL-MCNC: 91 MG/DL (ref 65–99)
HCT VFR BLD AUTO: 43.1 % (ref 34–46.6)
HDLC SERPL-MCNC: 64 MG/DL (ref 40–60)
HGB BLD-MCNC: 14.7 G/DL (ref 12–15.9)
IMM GRANULOCYTES # BLD AUTO: 0.03 10*3/MM3 (ref 0–0.05)
IMM GRANULOCYTES NFR BLD AUTO: 0.4 % (ref 0–0.5)
LDLC SERPL CALC-MCNC: 73 MG/DL (ref 0–100)
LDLC/HDLC SERPL: 1.08 {RATIO}
LYMPHOCYTES # BLD AUTO: 2 10*3/MM3 (ref 0.7–3.1)
LYMPHOCYTES NFR BLD AUTO: 24.6 % (ref 19.6–45.3)
MCH RBC QN AUTO: 32.3 PG (ref 26.6–33)
MCHC RBC AUTO-ENTMCNC: 34.1 G/DL (ref 31.5–35.7)
MCV RBC AUTO: 94.7 FL (ref 79–97)
MONOCYTES # BLD AUTO: 0.72 10*3/MM3 (ref 0.1–0.9)
MONOCYTES NFR BLD AUTO: 8.8 % (ref 5–12)
NEUTROPHILS NFR BLD AUTO: 4.96 10*3/MM3 (ref 1.7–7)
NEUTROPHILS NFR BLD AUTO: 60.9 % (ref 42.7–76)
NRBC BLD AUTO-RTO: 0 /100 WBC (ref 0–0.2)
PLATELET # BLD AUTO: 287 10*3/MM3 (ref 140–450)
PMV BLD AUTO: 11 FL (ref 6–12)
POTASSIUM SERPL-SCNC: 4.2 MMOL/L (ref 3.5–5.2)
PROT SERPL-MCNC: 6.3 G/DL (ref 6–8.5)
RBC # BLD AUTO: 4.55 10*6/MM3 (ref 3.77–5.28)
SODIUM SERPL-SCNC: 138 MMOL/L (ref 136–145)
TRIGL SERPL-MCNC: 128 MG/DL (ref 0–150)
TSH SERPL DL<=0.05 MIU/L-ACNC: 3.49 UIU/ML (ref 0.27–4.2)
VLDLC SERPL-MCNC: 22 MG/DL (ref 5–40)
WBC NRBC COR # BLD: 8.14 10*3/MM3 (ref 3.4–10.8)

## 2023-01-30 PROCEDURE — 80061 LIPID PANEL: CPT

## 2023-01-30 PROCEDURE — 85025 COMPLETE CBC W/AUTO DIFF WBC: CPT

## 2023-01-30 PROCEDURE — 80053 COMPREHEN METABOLIC PANEL: CPT

## 2023-01-30 PROCEDURE — 84443 ASSAY THYROID STIM HORMONE: CPT

## 2023-02-20 DIAGNOSIS — E78.5 HYPERLIPIDEMIA LDL GOAL <100: ICD-10-CM

## 2023-02-20 RX ORDER — ATORVASTATIN CALCIUM 10 MG/1
10 TABLET, FILM COATED ORAL DAILY
Qty: 90 TABLET | Refills: 1 | Status: SHIPPED | OUTPATIENT
Start: 2023-02-20

## 2023-04-24 ENCOUNTER — LAB (OUTPATIENT)
Dept: LAB | Facility: HOSPITAL | Age: 68
End: 2023-04-24
Payer: MEDICARE

## 2023-04-24 ENCOUNTER — OFFICE VISIT (OUTPATIENT)
Dept: INTERNAL MEDICINE | Facility: CLINIC | Age: 68
End: 2023-04-24
Payer: MEDICARE

## 2023-04-24 VITALS
BODY MASS INDEX: 33.75 KG/M2 | HEART RATE: 70 BPM | TEMPERATURE: 97 F | SYSTOLIC BLOOD PRESSURE: 118 MMHG | DIASTOLIC BLOOD PRESSURE: 74 MMHG | OXYGEN SATURATION: 97 % | HEIGHT: 66 IN | WEIGHT: 210 LBS

## 2023-04-24 DIAGNOSIS — E78.5 HYPERLIPIDEMIA LDL GOAL <100: Primary | ICD-10-CM

## 2023-04-24 DIAGNOSIS — E78.5 HYPERLIPIDEMIA LDL GOAL <100: ICD-10-CM

## 2023-04-24 LAB
ALBUMIN SERPL-MCNC: 4.2 G/DL (ref 3.5–5.2)
ALBUMIN/GLOB SERPL: 1.8 G/DL
ALP SERPL-CCNC: 80 U/L (ref 39–117)
ALT SERPL W P-5'-P-CCNC: 33 U/L (ref 1–33)
ANION GAP SERPL CALCULATED.3IONS-SCNC: 11.2 MMOL/L (ref 5–15)
AST SERPL-CCNC: 27 U/L (ref 1–32)
BILIRUB SERPL-MCNC: 0.5 MG/DL (ref 0–1.2)
BUN SERPL-MCNC: 16 MG/DL (ref 8–23)
BUN/CREAT SERPL: 20.3 (ref 7–25)
CALCIUM SPEC-SCNC: 9.5 MG/DL (ref 8.6–10.5)
CHLORIDE SERPL-SCNC: 106 MMOL/L (ref 98–107)
CHOLEST SERPL-MCNC: 206 MG/DL (ref 0–200)
CO2 SERPL-SCNC: 25.8 MMOL/L (ref 22–29)
CREAT SERPL-MCNC: 0.79 MG/DL (ref 0.57–1)
EGFRCR SERPLBLD CKD-EPI 2021: 80.6 ML/MIN/1.73
GLOBULIN UR ELPH-MCNC: 2.4 GM/DL
GLUCOSE SERPL-MCNC: 90 MG/DL (ref 65–99)
HDLC SERPL-MCNC: 77 MG/DL (ref 40–60)
LDLC SERPL CALC-MCNC: 104 MG/DL (ref 0–100)
LDLC/HDLC SERPL: 1.29 {RATIO}
POTASSIUM SERPL-SCNC: 4.3 MMOL/L (ref 3.5–5.2)
PROT SERPL-MCNC: 6.6 G/DL (ref 6–8.5)
SODIUM SERPL-SCNC: 143 MMOL/L (ref 136–145)
TRIGL SERPL-MCNC: 149 MG/DL (ref 0–150)
VLDLC SERPL-MCNC: 25 MG/DL (ref 5–40)

## 2023-04-24 PROCEDURE — 1159F MED LIST DOCD IN RCRD: CPT | Performed by: INTERNAL MEDICINE

## 2023-04-24 PROCEDURE — 99213 OFFICE O/P EST LOW 20 MIN: CPT | Performed by: INTERNAL MEDICINE

## 2023-04-24 PROCEDURE — 1160F RVW MEDS BY RX/DR IN RCRD: CPT | Performed by: INTERNAL MEDICINE

## 2023-04-24 PROCEDURE — 80053 COMPREHEN METABOLIC PANEL: CPT

## 2023-04-24 PROCEDURE — 80061 LIPID PANEL: CPT

## 2023-04-24 NOTE — PROGRESS NOTES
"Subjective   Samantha Gaines is a 68 y.o. female.   Chief Complaint   Patient presents with   • Hyperlipidemia       History of Present Illness   Here for f/u on chronic conditions: HLP. HLP controlled with Lipitor. No CP. No SOA.   The following portions of the patient's history were reviewed and updated as appropriate: allergies, current medications, past family history, past medical history, past social history, past surgical history and problem list.    Review of Systems   Constitutional: Negative for fatigue and fever.   Respiratory: Negative for cough and shortness of breath.    Cardiovascular: Negative for chest pain and leg swelling.   Gastrointestinal: Negative for diarrhea, nausea and vomiting.   Psychiatric/Behavioral: Negative for confusion. The patient is not nervous/anxious.      /74   Pulse 70   Temp 97 °F (36.1 °C)   Ht 167.6 cm (65.98\")   Wt 95.3 kg (210 lb)   SpO2 97%   BMI 33.92 kg/m²     Objective   Physical Exam  Vitals reviewed.   Cardiovascular:      Rate and Rhythm: Normal rate and regular rhythm.      Heart sounds: No murmur heard.  Pulmonary:      Effort: No respiratory distress.      Breath sounds: No wheezing.   Neurological:      Mental Status: She is alert and oriented to person, place, and time.   Psychiatric:         Behavior: Behavior normal.         Assessment & Plan   Diagnoses and all orders for this visit:    1. Hyperlipidemia LDL goal <100 (Primary)  -     Comprehensive Metabolic Panel; Future  -     Lipid Panel; Future  Continue Lipitor 10 mg po qhs.              "

## 2023-06-12 ENCOUNTER — OFFICE VISIT (OUTPATIENT)
Dept: INTERNAL MEDICINE | Facility: CLINIC | Age: 68
End: 2023-06-12
Payer: MEDICARE

## 2023-06-12 VITALS
OXYGEN SATURATION: 97 % | WEIGHT: 208 LBS | DIASTOLIC BLOOD PRESSURE: 74 MMHG | HEART RATE: 68 BPM | HEIGHT: 66 IN | TEMPERATURE: 97.6 F | BODY MASS INDEX: 33.43 KG/M2 | SYSTOLIC BLOOD PRESSURE: 128 MMHG

## 2023-06-12 DIAGNOSIS — J01.00 ACUTE NON-RECURRENT MAXILLARY SINUSITIS: Primary | ICD-10-CM

## 2023-06-12 LAB
EXPIRATION DATE: NORMAL
INTERNAL CONTROL: NORMAL
Lab: NORMAL
SARS-COV-2 AG UPPER RESP QL IA.RAPID: NOT DETECTED

## 2023-06-12 PROCEDURE — 87426 SARSCOV CORONAVIRUS AG IA: CPT | Performed by: NURSE PRACTITIONER

## 2023-06-12 PROCEDURE — 99213 OFFICE O/P EST LOW 20 MIN: CPT | Performed by: NURSE PRACTITIONER

## 2023-06-12 PROCEDURE — 1160F RVW MEDS BY RX/DR IN RCRD: CPT | Performed by: NURSE PRACTITIONER

## 2023-06-12 PROCEDURE — 1159F MED LIST DOCD IN RCRD: CPT | Performed by: NURSE PRACTITIONER

## 2023-06-12 RX ORDER — PREDNISONE 10 MG/1
10 TABLET ORAL DAILY
Qty: 4 TABLET | Refills: 0 | Status: SHIPPED | OUTPATIENT
Start: 2023-06-12 | End: 2023-06-16

## 2023-06-12 RX ORDER — ALBUTEROL SULFATE 90 UG/1
2 AEROSOL, METERED RESPIRATORY (INHALATION) EVERY 4 HOURS PRN
Qty: 18 G | Refills: 0 | Status: SHIPPED | OUTPATIENT
Start: 2023-06-12 | End: 2023-07-12

## 2023-06-12 RX ORDER — CETIRIZINE HYDROCHLORIDE 10 MG/1
10 TABLET ORAL DAILY
Qty: 30 TABLET | Refills: 0 | Status: SHIPPED | OUTPATIENT
Start: 2023-06-12 | End: 2023-07-12

## 2023-06-12 NOTE — PROGRESS NOTES
"    Office Note     Name: Samantha Gaines    : 1955     MRN: [id number]     Chief Complaint  Earache, Nasal Congestion, and Cough    Subjective     History of Present Illness:  Samantha Gaines is a 68 y.o. female who presents today for acute concerns    Patient regularly sees Dr. Figueroa with a scheduled appointment    Patient has had symptoms for about 1.5 to 2 weeks.  She mainly notices ear pain especially on the right side that can hurt into her face.  She does describe her teeth is hurting with some nasal congestion.  She does have an intermittent cough with increase in drainage especially in the morning and at night.  No fevers or significant exposure to sick contacts.  She is supposed to have been taking allergy medication but has not recently        Review of Systems   Constitutional:  Negative for chills, fatigue and fever.   HENT:  Positive for congestion and ear pain. Negative for sore throat.    Eyes:  Negative for visual disturbance.   Respiratory:  Positive for cough. Negative for shortness of breath.    Cardiovascular:  Negative for chest pain.   Gastrointestinal:  Negative for abdominal pain.   Skin:  Negative for color change.   Allergic/Immunologic: Negative for immunocompromised state.   Neurological:  Negative for headaches.   Psychiatric/Behavioral:  Negative for behavioral problems.      Past Medical History:   Diagnosis Date    Breast cyst     Closed fracture of distal end of right fibula with routine healing 2021    Diverticulitis of colon 2016    Diverticulosis     Essential hypertriglyceridemia     Incisional hernia 2016    PONV (postoperative nausea and vomiting)     Tubular adenoma of colon 12/10/2020    Vitamin D deficiency     \"HAD BLOODWORK RECENTLY AND MY DOCTOR DIDN'T SAY ANYTHING ABOUT THIS AT THAT TIME\"       Past Surgical History:   Procedure Laterality Date    BREAST BIOPSY      BREAST CYST EXCISION      PT UNSURE OF LATERALITY    COLONOSCOPY  UNKNOWN    " "DIAGNOSTIC LAPAROSCOPY      HYSTERECTOMY      OOPHORECTOMY      VENTRAL/INCISIONAL HERNIA REPAIR N/A 12/27/2016    Procedure: OPEN INCISIONAL HERNIA REPAIR WITH MESH;  Surgeon: Maximino Sparks MD;  Location: [facility] OR;  Service:        Social History     Socioeconomic History    Marital status:    Tobacco Use    Smoking status: Never    Smokeless tobacco: Never   Substance and Sexual Activity    Alcohol use: Yes     Alcohol/week: 4.0 standard drinks     Types: 4 Glasses of wine per week    Drug use: No    Sexual activity: Defer         Current Outpatient Medications:     atorvastatin (LIPITOR) 10 MG tablet, Take 1 tablet by mouth Daily., Disp: 90 tablet, Rfl: 1    Biotin 10 MG tablet, Take  by mouth., Disp: , Rfl:     Cholecalciferol (Vitamin D) 50 MCG (2000 UT) tablet, Take 2,000 Units by mouth Daily., Disp: , Rfl:     albuterol sulfate  (90 Base) MCG/ACT inhaler, Inhale 2 puffs Every 4 (Four) Hours As Needed for Wheezing for up to 30 days., Disp: 18 g, Rfl: 0    cetirizine (zyrTEC) 10 MG tablet, Take 1 tablet by mouth Daily for 30 days., Disp: 30 tablet, Rfl: 0    predniSONE (DELTASONE) 10 MG tablet, Take 1 tablet by mouth Daily for 4 days., Disp: 4 tablet, Rfl: 0    Objective     Vital Signs  /74   Pulse 68   Temp 97.6 °F (36.4 °C)   Ht 167.6 cm (65.98\")   Wt 94.3 kg (208 lb)   SpO2 97%   BMI 33.59 kg/m²   Estimated body mass index is 33.59 kg/m² as calculated from the following:    Height as of this encounter: 167.6 cm (65.98\").    Weight as of this encounter: 94.3 kg (208 lb).             Physical Exam  Vitals and nursing note reviewed.   Constitutional:       General: She is awake.      Appearance: Normal appearance. She is well-groomed.   HENT:      Head: Normocephalic and atraumatic.      Right Ear: Hearing, ear canal and external ear normal.      Left Ear: Hearing, ear canal and external ear normal.      Ears:      Comments: Clear effusion bilateral TM     Nose: Congestion " present.      Right Sinus: Maxillary sinus tenderness (Mild tenderness) present.      Left Sinus: Maxillary sinus tenderness (Mild tenderness) present.   Eyes:      Extraocular Movements: Extraocular movements intact.      Pupils: Pupils are equal, round, and reactive to light.   Cardiovascular:      Rate and Rhythm: Normal rate and regular rhythm.      Pulses: Normal pulses.      Heart sounds: Normal heart sounds.   Pulmonary:      Effort: Pulmonary effort is normal.      Breath sounds: Normal breath sounds.   Musculoskeletal:         General: Normal range of motion.   Lymphadenopathy:      Cervical: No cervical adenopathy.   Skin:     General: Skin is warm and dry.   Neurological:      Mental Status: She is alert and oriented to person, place, and time.   Psychiatric:         Mood and Affect: Mood normal.         Behavior: Behavior normal. Behavior is cooperative.        Lab Review:     Latest Reference Range & Units 06/12/23 12:42   SARS Antigen Not Detected, Presumptive Negative  Not Detected   Expiration Date  08/04/2023   Lot Number  [id number]          Assessment and Plan     Diagnoses and all orders for this visit:    1. Acute non-recurrent maxillary sinusitis (Primary)  -     predniSONE (DELTASONE) 10 MG tablet; Take 1 tablet by mouth Daily for 4 days.  Dispense: 4 tablet; Refill: 0  -     albuterol sulfate  (90 Base) MCG/ACT inhaler; Inhale 2 puffs Every 4 (Four) Hours As Needed for Wheezing for up to 30 days.  Dispense: 18 g; Refill: 0  -     cetirizine (zyrTEC) 10 MG tablet; Take 1 tablet by mouth Daily for 30 days.  Dispense: 30 tablet; Refill: 0  -     POCT SARS-CoV-2 Antigen MAGALIE    Plan  Discussed with patient the results of her in office test  Patient states she has not taken a lot of steroids but has not had significant issues with this medication  Discussed with patient that based on her physical assessment findings I do not feel that antibiotics would be appropriate at this time.  We would  still want to move forward with symptomatic treatment.  4 days of prednisone was sent to the pharmacy with a albuterol inhaler and allergy medication.  Continue to stay well-hydrated.  Continue with nasal hygiene.  Continue to alternate with Tylenol and Motrin as you are able  Go to ER if any condition worsens or severe  Keep upcoming appointment with Dr. Figueroa as scheduled    Follow Up  Return for As scheduled with Dr. Figueroa.    YVONNE Villarreal    Part of this note may be an electronic transcription/translation of spoken language to printed text using the Dragon Dictation System.

## 2023-08-22 DIAGNOSIS — E78.5 HYPERLIPIDEMIA LDL GOAL <100: ICD-10-CM

## 2023-08-22 RX ORDER — ATORVASTATIN CALCIUM 10 MG/1
10 TABLET, FILM COATED ORAL DAILY
Qty: 90 TABLET | Refills: 1 | Status: SHIPPED | OUTPATIENT
Start: 2023-08-22

## 2024-01-08 ENCOUNTER — OFFICE VISIT (OUTPATIENT)
Dept: INTERNAL MEDICINE | Facility: CLINIC | Age: 69
End: 2024-01-08
Payer: MEDICARE

## 2024-01-08 ENCOUNTER — LAB (OUTPATIENT)
Dept: LAB | Facility: HOSPITAL | Age: 69
End: 2024-01-08
Payer: MEDICARE

## 2024-01-08 VITALS
OXYGEN SATURATION: 96 % | HEART RATE: 79 BPM | HEIGHT: 66 IN | WEIGHT: 217.6 LBS | SYSTOLIC BLOOD PRESSURE: 122 MMHG | DIASTOLIC BLOOD PRESSURE: 78 MMHG | BODY MASS INDEX: 34.97 KG/M2

## 2024-01-08 DIAGNOSIS — R11.0 NAUSEA: ICD-10-CM

## 2024-01-08 DIAGNOSIS — E66.9 OBESITY (BMI 30-39.9): ICD-10-CM

## 2024-01-08 DIAGNOSIS — Z13.29 THYROID DISORDER SCREEN: ICD-10-CM

## 2024-01-08 DIAGNOSIS — R10.30 LOWER ABDOMINAL PAIN: ICD-10-CM

## 2024-01-08 DIAGNOSIS — E78.5 HYPERLIPIDEMIA LDL GOAL <100: ICD-10-CM

## 2024-01-08 DIAGNOSIS — Z00.00 MEDICARE ANNUAL WELLNESS VISIT, SUBSEQUENT: Primary | ICD-10-CM

## 2024-01-08 DIAGNOSIS — Z00.00 MEDICARE ANNUAL WELLNESS VISIT, SUBSEQUENT: ICD-10-CM

## 2024-01-08 LAB
ALBUMIN SERPL-MCNC: 4.5 G/DL (ref 3.5–5.2)
ALBUMIN/GLOB SERPL: 2.1 G/DL
ALP SERPL-CCNC: 77 U/L (ref 39–117)
ALT SERPL W P-5'-P-CCNC: 22 U/L (ref 1–33)
ANION GAP SERPL CALCULATED.3IONS-SCNC: 9 MMOL/L (ref 5–15)
AST SERPL-CCNC: 20 U/L (ref 1–32)
BASOPHILS # BLD AUTO: 0.11 10*3/MM3 (ref 0–0.2)
BASOPHILS NFR BLD AUTO: 1.5 % (ref 0–1.5)
BILIRUB SERPL-MCNC: 0.7 MG/DL (ref 0–1.2)
BUN SERPL-MCNC: 13 MG/DL (ref 8–23)
BUN/CREAT SERPL: 16 (ref 7–25)
CALCIUM SPEC-SCNC: 9.2 MG/DL (ref 8.6–10.5)
CHLORIDE SERPL-SCNC: 105 MMOL/L (ref 98–107)
CHOLEST SERPL-MCNC: 192 MG/DL (ref 0–200)
CO2 SERPL-SCNC: 25 MMOL/L (ref 22–29)
CREAT SERPL-MCNC: 0.81 MG/DL (ref 0.57–1)
DEPRECATED RDW RBC AUTO: 43.4 FL (ref 37–54)
EGFRCR SERPLBLD CKD-EPI 2021: 78.2 ML/MIN/1.73
EOSINOPHIL # BLD AUTO: 0.27 10*3/MM3 (ref 0–0.4)
EOSINOPHIL NFR BLD AUTO: 3.6 % (ref 0.3–6.2)
ERYTHROCYTE [DISTWIDTH] IN BLOOD BY AUTOMATED COUNT: 12.4 % (ref 12.3–15.4)
GLOBULIN UR ELPH-MCNC: 2.1 GM/DL
GLUCOSE SERPL-MCNC: 93 MG/DL (ref 65–99)
HCT VFR BLD AUTO: 44.1 % (ref 34–46.6)
HDLC SERPL-MCNC: 69 MG/DL (ref 40–60)
HGB BLD-MCNC: 15.2 G/DL (ref 12–15.9)
IMM GRANULOCYTES # BLD AUTO: 0.02 10*3/MM3 (ref 0–0.05)
IMM GRANULOCYTES NFR BLD AUTO: 0.3 % (ref 0–0.5)
LDLC SERPL CALC-MCNC: 97 MG/DL (ref 0–100)
LDLC/HDLC SERPL: 1.34 {RATIO}
LYMPHOCYTES # BLD AUTO: 1.89 10*3/MM3 (ref 0.7–3.1)
LYMPHOCYTES NFR BLD AUTO: 25.5 % (ref 19.6–45.3)
MCH RBC QN AUTO: 33.1 PG (ref 26.6–33)
MCHC RBC AUTO-ENTMCNC: 34.5 G/DL (ref 31.5–35.7)
MCV RBC AUTO: 96.1 FL (ref 79–97)
MONOCYTES # BLD AUTO: 0.57 10*3/MM3 (ref 0.1–0.9)
MONOCYTES NFR BLD AUTO: 7.7 % (ref 5–12)
NEUTROPHILS NFR BLD AUTO: 4.55 10*3/MM3 (ref 1.7–7)
NEUTROPHILS NFR BLD AUTO: 61.4 % (ref 42.7–76)
NRBC BLD AUTO-RTO: 0 /100 WBC (ref 0–0.2)
PLATELET # BLD AUTO: 282 10*3/MM3 (ref 140–450)
PMV BLD AUTO: 10.3 FL (ref 6–12)
POTASSIUM SERPL-SCNC: 4 MMOL/L (ref 3.5–5.2)
PROT SERPL-MCNC: 6.6 G/DL (ref 6–8.5)
RBC # BLD AUTO: 4.59 10*6/MM3 (ref 3.77–5.28)
SODIUM SERPL-SCNC: 139 MMOL/L (ref 136–145)
TRIGL SERPL-MCNC: 154 MG/DL (ref 0–150)
TSH SERPL DL<=0.05 MIU/L-ACNC: 3.41 UIU/ML (ref 0.27–4.2)
VLDLC SERPL-MCNC: 26 MG/DL (ref 5–40)
WBC NRBC COR # BLD AUTO: 7.41 10*3/MM3 (ref 3.4–10.8)

## 2024-01-08 PROCEDURE — 85025 COMPLETE CBC W/AUTO DIFF WBC: CPT

## 2024-01-08 PROCEDURE — 99214 OFFICE O/P EST MOD 30 MIN: CPT | Performed by: INTERNAL MEDICINE

## 2024-01-08 PROCEDURE — 84443 ASSAY THYROID STIM HORMONE: CPT

## 2024-01-08 PROCEDURE — 80053 COMPREHEN METABOLIC PANEL: CPT

## 2024-01-08 PROCEDURE — G0439 PPPS, SUBSEQ VISIT: HCPCS | Performed by: INTERNAL MEDICINE

## 2024-01-08 PROCEDURE — 80061 LIPID PANEL: CPT

## 2024-01-08 PROCEDURE — 1170F FXNL STATUS ASSESSED: CPT | Performed by: INTERNAL MEDICINE

## 2024-01-08 NOTE — PROGRESS NOTES
The ABCs of the Annual Wellness Visit  Subsequent Medicare Wellness Visit    Subjective    Samantha Gaines is a 68 y.o. female who presents for a Subsequent Medicare Wellness Visit.    The following portions of the patient's history were reviewed and   updated as appropriate: allergies, current medications, past family history, past medical history, past social history, past surgical history, and problem list.    Compared to one year ago, the patient feels her physical   health is the same.    Compared to one year ago, the patient feels her mental   health is the same.    Recent Hospitalizations:  She was not admitted to the hospital during the last year.       Current Medical Providers:  Patient Care Team:  Julia Figueroa DO as PCP - General  Julia Figueroa DO as PCP - Family Medicine    Outpatient Medications Prior to Visit   Medication Sig Dispense Refill    atorvastatin (LIPITOR) 10 MG tablet Take 1 tablet by mouth Daily. 90 tablet 1    Cholecalciferol (Vitamin D) 50 MCG (2000 UT) tablet Take 1 tablet by mouth Daily.      Biotin 10 MG tablet Take  by mouth.      cetirizine (zyrTEC) 10 MG tablet Take 1 tablet by mouth Daily for 30 days. 30 tablet 0     No facility-administered medications prior to visit.       No opioid medication identified on active medication list. I have reviewed chart for other potential  high risk medication/s and harmful drug interactions in the elderly.        Aspirin is not on active medication list.  Aspirin use is not indicated based on review of current medical condition/s. Risk of harm outweighs potential benefits.  .    Patient Active Problem List   Diagnosis    Hyperlipidemia LDL goal <100    Vitamin D deficiency    Diverticulosis    Tubular adenoma of colon     Advance Care Planning   Advance Care Planning     Advance Directive is on file.  ACP discussion was held with the patient during this visit. Patient has an advance directive in EMR which is still valid.     "  Objective    Vitals:    24 0909   BP: 122/78   Pulse: 79   SpO2: 96%   Weight: 98.7 kg (217 lb 9.6 oz)   Height: 167.6 cm (65.98\")   PainSc: 0-No pain     Estimated body mass index is 35.14 kg/m² as calculated from the following:    Height as of this encounter: 167.6 cm (65.98\").    Weight as of this encounter: 98.7 kg (217 lb 9.6 oz).    BMI is >= 30 and <35. (Class 1 Obesity). The following options were offered after discussion;: exercise counseling/recommendations      Does the patient have evidence of cognitive impairment? No          HEALTH RISK ASSESSMENT    Smoking Status:  Social History     Tobacco Use   Smoking Status Never   Smokeless Tobacco Never     Alcohol Consumption:  Social History     Substance and Sexual Activity   Alcohol Use Yes    Alcohol/week: 4.0 standard drinks of alcohol    Types: 4 Glasses of wine per week     Fall Risk Screen:    ARINA Fall Risk Assessment was completed, and patient is at HIGH risk for falls. Assessment completed on:2024    Depression Screenin/8/2024     9:14 AM   PHQ-2/PHQ-9 Depression Screening   Little Interest or Pleasure in Doing Things 0-->not at all   Feeling Down, Depressed or Hopeless 0-->not at all   PHQ-9: Brief Depression Severity Measure Score 0       Health Habits and Functional and Cognitive Screenin/8/2024     9:14 AM   Functional & Cognitive Status   Do you have difficulty preparing food and eating? No   Do you have difficulty bathing yourself, getting dressed or grooming yourself? No   Do you have difficulty using the toilet? No   Do you have difficulty moving around from place to place? No   Do you have trouble with steps or getting out of a bed or a chair? No   Current Diet Well Balanced Diet   Dental Exam Up to date   Eye Exam Up to date   Exercise (times per week) 3 times per week   Current Exercises Include Other;Walking   Do you need help using the phone?  No   Are you deaf or do you have serious difficulty hearing?  " No   Do you need help to go to places out of walking distance? No   Do you need help shopping? No   Do you need help preparing meals?  No   Do you need help with housework?  No   Do you need help with laundry? No   Do you need help taking your medications? No   Do you need help managing money? No   Do you ever drive or ride in a car without wearing a seat belt? No   Have you felt unusual stress, anger or loneliness in the last month? No   Who do you live with? Spouse   If you need help, do you have trouble finding someone available to you? No   Have you been bothered in the last four weeks by sexual problems? No   Do you have difficulty concentrating, remembering or making decisions? No       Age-appropriate Screening Schedule:  Refer to the list below for future screening recommendations based on patient's age, sex and/or medical conditions. Orders for these recommended tests are listed in the plan section. The patient has been provided with a written plan.    Health Maintenance   Topic Date Due    TDAP/TD VACCINES (1 - Tdap) Never done    ZOSTER VACCINE (2 of 2) 03/31/2014    LIPID PANEL  04/24/2024    MAMMOGRAM  08/23/2024    ANNUAL WELLNESS VISIT  01/08/2025    BMI FOLLOWUP  01/08/2025    DXA SCAN  01/12/2025    COLORECTAL CANCER SCREENING  09/21/2027    HEPATITIS C SCREENING  Completed    COVID-19 Vaccine  Completed    INFLUENZA VACCINE  Completed    Pneumococcal Vaccine 65+  Completed    PAP SMEAR  Discontinued                  CMS Preventative Services Quick Reference  Risk Factors Identified During Encounter  Fall Risk-High or Moderate: Discussed Fall Prevention in the home  The above risks/problems have been discussed with the patient.  Pertinent information has been shared with the patient in the After Visit Summary.  An After Visit Summary and PPPS were made available to the patient.    Follow Up:   Next Medicare Wellness visit to be scheduled in 1 year.       Additional E&M Note during same encounter  "follows:  Patient has multiple medical problems which are significant and separately identifiable that require additional work above and beyond the Medicare Wellness Visit.      Chief Complaint  Medicare Wellness-subsequent and Hyperlipidemia    Subjective        HPI  Samantha Gaines is also being seen today for HLP. HLP controlled with Lipitor.  Had 2 episodes of lower abdominal pain with cramping. No fever. Loose stools. No blood in stools. No vomiting. No fever. Some nausea.     Review of Systems   Constitutional:  Negative for fatigue and fever.   Respiratory:  Negative for cough and shortness of breath.    Cardiovascular:  Negative for chest pain and leg swelling.   Gastrointestinal:  Positive for abdominal pain and nausea. Negative for blood in stool, constipation and vomiting.        Loose stools   Genitourinary:  Negative for dysuria.   Neurological:  Negative for confusion.       Objective   Vital Signs:  /78   Pulse 79   Ht 167.6 cm (65.98\")   Wt 98.7 kg (217 lb 9.6 oz)   SpO2 96%   BMI 35.14 kg/m²     Physical Exam  Vitals reviewed.   Cardiovascular:      Rate and Rhythm: Normal rate and regular rhythm.      Heart sounds: No murmur heard.  Pulmonary:      Effort: No respiratory distress.      Breath sounds: No wheezing.   Abdominal:      General: There is no distension.      Palpations: Abdomen is soft.      Tenderness: There is no abdominal tenderness. There is no guarding.   Neurological:      Mental Status: She is alert.   Psychiatric:         Behavior: Behavior normal.                         Assessment and Plan   Diagnoses and all orders for this visit:    1. Medicare annual wellness visit, subsequent (Primary)  -     CBC & Differential; Future  -     Comprehensive Metabolic Panel; Future    2. Hyperlipidemia LDL goal <100  -     Lipid Panel; Future  Continue Lipitor 10 mg po qhs  3. Thyroid disorder screen  -     TSH; Future    4. Obesity (BMI 30-39.9)  Low carb diet with exercise  5. " Lower abdominal pain  -     CT Abdomen Pelvis Without Contrast; Future    6. Nausea  -     CT Abdomen Pelvis Without Contrast; Future             Follow Up   Return in about 6 months (around 7/8/2024).  Patient was given instructions and counseling regarding her condition or for health maintenance advice. Please see specific information pulled into the AVS if appropriate.

## 2024-01-23 ENCOUNTER — HOSPITAL ENCOUNTER (OUTPATIENT)
Dept: CT IMAGING | Facility: HOSPITAL | Age: 69
Discharge: HOME | End: 2024-01-23
Admitting: INTERNAL MEDICINE
Payer: MEDICARE

## 2024-01-23 DIAGNOSIS — R10.30 LOWER ABDOMINAL PAIN: ICD-10-CM

## 2024-01-23 DIAGNOSIS — R11.0 NAUSEA: ICD-10-CM

## 2024-01-23 PROCEDURE — 74176 CT ABD & PELVIS W/O CONTRAST: CPT

## 2024-01-24 ENCOUNTER — TELEPHONE (OUTPATIENT)
Dept: INTERNAL MEDICINE | Facility: CLINIC | Age: 69
End: 2024-01-24
Payer: MEDICARE

## 2024-01-24 DIAGNOSIS — R93.3 ABNORMAL CT SCAN, COLON: Primary | ICD-10-CM

## 2024-01-24 NOTE — TELEPHONE ENCOUNTER
----- Message from Julia Figueroa DO sent at 1/24/2024  1:23 PM EST -----  Wall thickening of colon. Sending to Dr. Patel for colonoscopy to look at that area.  Small place on kidney ( too tiny to characterize).  Recommend Us kidneys in 4 months. [facility] office visit in 4 m and we can order US at that time

## 2024-01-25 NOTE — TELEPHONE ENCOUNTER
*HUB TO RELAY*   Attempted to reach the patient to schedule for 4 month follow up. She was scheduled originally on 1/31 for colonoscopy with . Patient informed referral coordinator that this was not a good day so she will reach out to office to reschedule. If patient calls back she will need to be scheduled for a 4 month follow up with  as well.

## 2024-01-25 NOTE — TELEPHONE ENCOUNTER
PATIENT STATES SHE HAS ALREADY RECEIVED MESSAGE AND STATES SHE HAS AN UPCOMING APPOINT 01/31/24 WITH DR. GOLDEN FOR AN OFFICE VISIT  AND NOT A COLONOSCOPY.

## 2024-02-16 DIAGNOSIS — E78.5 HYPERLIPIDEMIA LDL GOAL <100: ICD-10-CM

## 2024-02-16 RX ORDER — ATORVASTATIN CALCIUM 10 MG/1
10 TABLET, FILM COATED ORAL DAILY
Qty: 90 TABLET | Refills: 1 | Status: SHIPPED | OUTPATIENT
Start: 2024-02-16

## 2024-05-22 ENCOUNTER — OFFICE VISIT (OUTPATIENT)
Dept: INTERNAL MEDICINE | Facility: CLINIC | Age: 69
End: 2024-05-22
Payer: MEDICARE

## 2024-05-22 VITALS
WEIGHT: 217.9 LBS | TEMPERATURE: 97.5 F | RESPIRATION RATE: 18 BRPM | HEART RATE: 65 BPM | OXYGEN SATURATION: 98 % | DIASTOLIC BLOOD PRESSURE: 78 MMHG | BODY MASS INDEX: 35.02 KG/M2 | HEIGHT: 66 IN | SYSTOLIC BLOOD PRESSURE: 128 MMHG

## 2024-05-22 DIAGNOSIS — Z12.31 ENCOUNTER FOR SCREENING MAMMOGRAM FOR BREAST CANCER: ICD-10-CM

## 2024-05-22 DIAGNOSIS — N28.1 CYST OF LEFT KIDNEY: ICD-10-CM

## 2024-05-22 DIAGNOSIS — E78.5 HYPERLIPIDEMIA LDL GOAL <100: Primary | ICD-10-CM

## 2024-05-22 PROCEDURE — 1126F AMNT PAIN NOTED NONE PRSNT: CPT | Performed by: INTERNAL MEDICINE

## 2024-05-22 PROCEDURE — G2211 COMPLEX E/M VISIT ADD ON: HCPCS | Performed by: INTERNAL MEDICINE

## 2024-05-22 PROCEDURE — 99214 OFFICE O/P EST MOD 30 MIN: CPT | Performed by: INTERNAL MEDICINE

## 2024-05-22 NOTE — PROGRESS NOTES
"Subjective   Samantha Gaines is a 69 y.o. female.   Chief Complaint   Patient presents with    Hyperlipidemia    Follow-up     Ct and colonoscopy; coughing and joint pain       History of Present Illness   Here for f/u on HLP.  HLP controlled with Lipitor.  Small hypodense place on left kidney , need f/u us. She did see Dr. Patel for a colonoscopy  for the thickening seen on CT scan.  The following portions of the patient's history were reviewed and updated as appropriate: allergies, current medications, past family history, past medical history, past social history, past surgical history, and problem list.    Review of Systems   Constitutional:  Negative for fatigue and fever.   Respiratory:  Negative for cough.    Cardiovascular:  Negative for chest pain.   Gastrointestinal:  Negative for constipation and diarrhea.   Psychiatric/Behavioral:  Negative for confusion.      /78 (BP Location: Left arm, Patient Position: Sitting, Cuff Size: Adult)   Pulse 65   Temp 97.5 °F (36.4 °C) (Infrared)   Resp 18   Ht 167.6 cm (65.98\")   Wt 98.8 kg (217 lb 14.4 oz)   SpO2 98%   BMI 35.19 kg/m²     Objective   Physical Exam  Vitals reviewed.   Cardiovascular:      Rate and Rhythm: Normal rate and regular rhythm.   Pulmonary:      Effort: No respiratory distress.      Breath sounds: No wheezing.   Neurological:      Mental Status: She is alert and oriented to person, place, and time.   Psychiatric:         Behavior: Behavior normal.         Assessment & Plan   Diagnoses and all orders for this visit:    1. Hyperlipidemia LDL goal <100 (Primary)  -     Lipid Panel; Future  -     Comprehensive Metabolic Panel; Future  Continue Lipitor 10 mg po qhs  2. Cyst of left kidney  -     US Renal Bilateral; Future    3. Encounter for screening mammogram for breast cancer  -     Mammo screening digital tomosynthesis bilateral w CAD; Future                 "

## 2024-05-24 ENCOUNTER — LAB (OUTPATIENT)
Dept: LAB | Facility: HOSPITAL | Age: 69
End: 2024-05-24
Payer: MEDICARE

## 2024-05-24 DIAGNOSIS — E78.5 HYPERLIPIDEMIA LDL GOAL <100: ICD-10-CM

## 2024-05-24 LAB
ALBUMIN SERPL-MCNC: 4.1 G/DL (ref 3.5–5.2)
ALBUMIN/GLOB SERPL: 1.7 G/DL
ALP SERPL-CCNC: 69 U/L (ref 39–117)
ALT SERPL W P-5'-P-CCNC: 21 U/L (ref 1–33)
ANION GAP SERPL CALCULATED.3IONS-SCNC: 9.8 MMOL/L (ref 5–15)
AST SERPL-CCNC: 20 U/L (ref 1–32)
BILIRUB SERPL-MCNC: 0.6 MG/DL (ref 0–1.2)
BUN SERPL-MCNC: 10 MG/DL (ref 8–23)
BUN/CREAT SERPL: 12.5 (ref 7–25)
CALCIUM SPEC-SCNC: 8.9 MG/DL (ref 8.6–10.5)
CHLORIDE SERPL-SCNC: 108 MMOL/L (ref 98–107)
CHOLEST SERPL-MCNC: 176 MG/DL (ref 0–200)
CO2 SERPL-SCNC: 25.2 MMOL/L (ref 22–29)
CREAT SERPL-MCNC: 0.8 MG/DL (ref 0.57–1)
EGFRCR SERPLBLD CKD-EPI 2021: 78.9 ML/MIN/1.73
GLOBULIN UR ELPH-MCNC: 2.4 GM/DL
GLUCOSE SERPL-MCNC: 95 MG/DL (ref 65–99)
HDLC SERPL-MCNC: 68 MG/DL (ref 40–60)
LDLC SERPL CALC-MCNC: 84 MG/DL (ref 0–100)
LDLC/HDLC SERPL: 1.18 {RATIO}
POTASSIUM SERPL-SCNC: 4.2 MMOL/L (ref 3.5–5.2)
PROT SERPL-MCNC: 6.5 G/DL (ref 6–8.5)
SODIUM SERPL-SCNC: 143 MMOL/L (ref 136–145)
TRIGL SERPL-MCNC: 138 MG/DL (ref 0–150)
VLDLC SERPL-MCNC: 24 MG/DL (ref 5–40)

## 2024-05-24 PROCEDURE — 80061 LIPID PANEL: CPT

## 2024-05-24 PROCEDURE — 80053 COMPREHEN METABOLIC PANEL: CPT

## 2024-06-11 ENCOUNTER — TELEPHONE (OUTPATIENT)
Dept: INTERNAL MEDICINE | Facility: CLINIC | Age: 69
End: 2024-06-11

## 2024-06-11 NOTE — TELEPHONE ENCOUNTER
Caller: Samantha Gaines    Relationship: Self    Best call back number: [phone]     What was the call regarding: patient is having PAIN in her back around her left kidney. WOULD LIKE TO SPEAK WITH PCP, FUTURE ULTRASOUND IS PLANNED. THIS HAS BEEN GOING ON FOR 4 DAYS. PLEASE ADVISE.    Is it okay if the provider responds through MyChart:

## 2024-06-12 ENCOUNTER — OFFICE VISIT (OUTPATIENT)
Dept: INTERNAL MEDICINE | Facility: CLINIC | Age: 69
End: 2024-06-12
Payer: MEDICARE

## 2024-06-12 VITALS
BODY MASS INDEX: 35.03 KG/M2 | DIASTOLIC BLOOD PRESSURE: 73 MMHG | HEART RATE: 76 BPM | OXYGEN SATURATION: 97 % | SYSTOLIC BLOOD PRESSURE: 137 MMHG | WEIGHT: 218 LBS | TEMPERATURE: 98.5 F | HEIGHT: 66 IN

## 2024-06-12 DIAGNOSIS — N39.0 ACUTE UTI: ICD-10-CM

## 2024-06-12 DIAGNOSIS — R10.9 FLANK PAIN: Primary | ICD-10-CM

## 2024-06-12 LAB
BILIRUB BLD-MCNC: NEGATIVE MG/DL
CLARITY, POC: CLEAR
COLOR UR: YELLOW
EXPIRATION DATE: ABNORMAL
GLUCOSE UR STRIP-MCNC: NEGATIVE MG/DL
KETONES UR QL: NEGATIVE
LEUKOCYTE EST, POC: ABNORMAL
Lab: ABNORMAL
NITRITE UR-MCNC: NEGATIVE MG/ML
PH UR: 5.5 [PH] (ref 5–8)
PROT UR STRIP-MCNC: NEGATIVE MG/DL
RBC # UR STRIP: NEGATIVE /UL
SP GR UR: 1.02 (ref 1–1.03)
UROBILINOGEN UR QL: ABNORMAL

## 2024-06-12 PROCEDURE — G2211 COMPLEX E/M VISIT ADD ON: HCPCS | Performed by: INTERNAL MEDICINE

## 2024-06-12 PROCEDURE — 81003 URINALYSIS AUTO W/O SCOPE: CPT | Performed by: INTERNAL MEDICINE

## 2024-06-12 PROCEDURE — 99213 OFFICE O/P EST LOW 20 MIN: CPT | Performed by: INTERNAL MEDICINE

## 2024-06-12 PROCEDURE — 1125F AMNT PAIN NOTED PAIN PRSNT: CPT | Performed by: INTERNAL MEDICINE

## 2024-06-12 RX ORDER — SULFAMETHOXAZOLE AND TRIMETHOPRIM 800; 160 MG/1; MG/1
1 TABLET ORAL 2 TIMES DAILY
Qty: 10 TABLET | Refills: 0 | Status: SHIPPED | OUTPATIENT
Start: 2024-06-12

## 2024-06-12 NOTE — PROGRESS NOTES
"Subjective   Samantha Gaines is a 69 y.o. female.   Chief Complaint   Patient presents with    Back Pain    Flank Pain     Started 4 days ago.       History of Present Illness   Saturday had left flank pain. No fever. No burning with urination. No frequency. She increased her water intake yesterday and the pain feels better.   The following portions of the patient's history were reviewed and updated as appropriate: allergies, current medications, past family history, past medical history, past social history, past surgical history, and problem list.    Review of Systems   Constitutional:  Negative for fatigue and fever.   Genitourinary:  Positive for flank pain. Negative for dysuria, frequency and urgency.   /73   Pulse 76   Temp 98.5 °F (36.9 °C) (Temporal)   Ht 167.5 cm (65.95\")   Wt 98.9 kg (218 lb)   SpO2 97%   BMI 35.24 kg/m²       Objective   Physical Exam  Vitals reviewed.   Abdominal:      Tenderness: There is no right CVA tenderness or left CVA tenderness.   Neurological:      Mental Status: She is oriented to person, place, and time.   Psychiatric:         Behavior: Behavior normal.         Assessment & Plan   Diagnoses and all orders for this visit:    1. Flank pain (Primary)  -     POCT urinalysis dipstick, automated  Has US kidney nolan.     2. Acute UTI  Urine culture  Bactrim DS bid x 5 days           "

## 2024-06-15 ENCOUNTER — HOSPITAL ENCOUNTER (OUTPATIENT)
Facility: HOSPITAL | Age: 69
Discharge: HOME | End: 2024-06-15
Payer: MEDICARE

## 2024-06-15 DIAGNOSIS — N28.1 CYST OF LEFT KIDNEY: ICD-10-CM

## 2024-06-15 LAB
BACTERIA UR CULT: NORMAL
BACTERIA UR CULT: NORMAL

## 2024-06-15 PROCEDURE — 76775 US EXAM ABDO BACK WALL LIM: CPT

## 2024-06-18 ENCOUNTER — TELEPHONE (OUTPATIENT)
Dept: INTERNAL MEDICINE | Facility: CLINIC | Age: 69
End: 2024-06-18
Payer: MEDICARE

## 2024-06-18 NOTE — TELEPHONE ENCOUNTER
Caller: Samantha Gaines    Relationship: Self    Best call back number: [phone]     What was the call regarding:   PATIENT WANTED TO INFORM HAL MCGHEE DO THAT SHE HAD THE ULTRASOUND DONE 06/15/2024 AND SHE HAS FINISHED HER ANTIBIOTIC ON 06/16/2024 AND IT HELPED BUT PATIENT DON'T FEEL 100% BETTER

## 2024-06-18 NOTE — TELEPHONE ENCOUNTER
Spoke with patient regarding recommendations and pending results. Patient was understanding and appreciative

## 2024-06-19 ENCOUNTER — TELEPHONE (OUTPATIENT)
Dept: INTERNAL MEDICINE | Facility: CLINIC | Age: 69
End: 2024-06-19
Payer: MEDICARE

## 2024-06-19 DIAGNOSIS — N28.9 KIDNEY LESION, NATIVE, LEFT: Primary | ICD-10-CM

## 2024-06-19 NOTE — TELEPHONE ENCOUNTER
Spoke with patient regarding results and recommendations. Patient was understanding and appreciative

## 2024-06-19 NOTE — TELEPHONE ENCOUNTER
----- Message from Julia Figueroa sent at 6/19/2024  8:40 AM EDT -----  Area on left kidney that was small on Ct can't see on us. I am sending her to Urology for further eval.

## 2024-07-18 ENCOUNTER — HOSPITAL ENCOUNTER (OUTPATIENT)
Dept: MAMMOGRAPHY | Facility: HOSPITAL | Age: 69
Discharge: HOME | End: 2024-07-18
Admitting: INTERNAL MEDICINE
Payer: MEDICARE

## 2024-07-18 DIAGNOSIS — Z12.31 ENCOUNTER FOR SCREENING MAMMOGRAM FOR BREAST CANCER: ICD-10-CM

## 2024-07-18 PROCEDURE — 77063 BREAST TOMOSYNTHESIS BI: CPT

## 2024-07-18 PROCEDURE — 77067 SCR MAMMO BI INCL CAD: CPT

## 2024-08-05 ENCOUNTER — OFFICE VISIT (OUTPATIENT)
Dept: INTERNAL MEDICINE | Facility: CLINIC | Age: 69
End: 2024-08-05
Payer: MEDICARE

## 2024-08-05 VITALS
OXYGEN SATURATION: 97 % | TEMPERATURE: 98.3 F | HEART RATE: 80 BPM | HEIGHT: 66 IN | BODY MASS INDEX: 35.36 KG/M2 | WEIGHT: 220 LBS | DIASTOLIC BLOOD PRESSURE: 80 MMHG | SYSTOLIC BLOOD PRESSURE: 132 MMHG

## 2024-08-05 DIAGNOSIS — M79.2 NERVE PAIN: Primary | ICD-10-CM

## 2024-08-05 PROCEDURE — 1159F MED LIST DOCD IN RCRD: CPT | Performed by: INTERNAL MEDICINE

## 2024-08-05 PROCEDURE — 1125F AMNT PAIN NOTED PAIN PRSNT: CPT | Performed by: INTERNAL MEDICINE

## 2024-08-05 PROCEDURE — 1160F RVW MEDS BY RX/DR IN RCRD: CPT | Performed by: INTERNAL MEDICINE

## 2024-08-05 PROCEDURE — 99213 OFFICE O/P EST LOW 20 MIN: CPT | Performed by: INTERNAL MEDICINE

## 2024-08-05 RX ORDER — FAMCICLOVIR 500 MG/1
500 TABLET ORAL 3 TIMES DAILY
Qty: 21 TABLET | Refills: 0 | Status: SHIPPED | OUTPATIENT
Start: 2024-08-05

## 2024-08-05 NOTE — PROGRESS NOTES
"Subjective   Samantha Gaines is a 69 y.o. female.     History of Present Illness   Left side sensitivity. Feels like nerve pain when she had shingles before. She does not have a rash.   The following portions of the patient's history were reviewed and updated as appropriate: allergies, current medications, past family history, past medical history, past social history, past surgical history, and problem list.    Review of Systems   Constitutional:  Negative for fatigue and fever.   Respiratory:  Negative for cough and shortness of breath.    Neurological:         Nerve pain     /80   Pulse 80   Temp 98.3 °F (36.8 °C) (Temporal)   Ht 167.5 cm (65.95\")   Wt 99.8 kg (220 lb)   SpO2 97%   BMI 35.57 kg/m²     Objective   Physical Exam  Vitals reviewed.   Cardiovascular:      Rate and Rhythm: Normal rate and regular rhythm.   Pulmonary:      Effort: No respiratory distress.      Breath sounds: No wheezing.   Skin:     Findings: No rash.   Neurological:      Mental Status: She is alert.         Assessment & Plan   Diagnoses and all orders for this visit:    1. Nerve pain (Primary)  Although she does not have a rash, this pain is exactly the same as when she had shingles before. Will try famvir 500 mg po tid x 7 days.  -     famciclovir (FAMVIR) 500 MG tablet; Take 1 tablet by mouth 3 (Three) Times a Day.  Dispense: 21 tablet; Refill: 0                 "

## 2024-08-21 DIAGNOSIS — E78.5 HYPERLIPIDEMIA LDL GOAL <100: ICD-10-CM

## 2024-08-21 RX ORDER — ATORVASTATIN CALCIUM 10 MG/1
10 TABLET, FILM COATED ORAL DAILY
Qty: 30 TABLET | Refills: 3 | Status: SHIPPED | OUTPATIENT
Start: 2024-08-21

## 2024-11-22 ENCOUNTER — OFFICE VISIT (OUTPATIENT)
Dept: INTERNAL MEDICINE | Facility: CLINIC | Age: 69
End: 2024-11-22
Payer: MEDICARE

## 2024-11-22 VITALS
HEART RATE: 76 BPM | TEMPERATURE: 98.2 F | OXYGEN SATURATION: 99 % | BODY MASS INDEX: 35.55 KG/M2 | WEIGHT: 221.2 LBS | HEIGHT: 66 IN | DIASTOLIC BLOOD PRESSURE: 78 MMHG | SYSTOLIC BLOOD PRESSURE: 120 MMHG

## 2024-11-22 DIAGNOSIS — Z13.29 THYROID DISORDER SCREEN: Primary | ICD-10-CM

## 2024-11-22 DIAGNOSIS — E78.5 HYPERLIPIDEMIA LDL GOAL <100: Primary | ICD-10-CM

## 2024-11-22 DIAGNOSIS — Z00.00 MEDICARE ANNUAL WELLNESS VISIT, SUBSEQUENT: ICD-10-CM

## 2024-11-22 NOTE — PROGRESS NOTES
"Subjective   Samantha Gaines is a 69 y.o. female.   Chief Complaint   Patient presents with    Hyperlipidemia       History of Present Illness   F/u on HLP. HLP controlled with Lipitor. No CP or SOA. Shingles resolved with famvir.   The following portions of the patient's history were reviewed and updated as appropriate: allergies, current medications, past family history, past medical history, past social history, past surgical history, and problem list.    Review of Systems   Constitutional:  Negative for diaphoresis, fatigue and fever.   HENT:  Negative for rhinorrhea, sinus pressure and sinus pain.    Respiratory:  Negative for cough and shortness of breath.    Cardiovascular:  Negative for chest pain and leg swelling.   Gastrointestinal:  Negative for abdominal pain.   Psychiatric/Behavioral:  Negative for behavioral problems.      /78 (BP Location: Left arm, Patient Position: Sitting)   Pulse 76   Temp 98.2 °F (36.8 °C) (Temporal)   Ht 167.5 cm (65.95\")   Wt 100 kg (221 lb 3.2 oz)   SpO2 99%   BMI 35.76 kg/m²     Objective   Physical Exam  Vitals reviewed.   Cardiovascular:      Rate and Rhythm: Normal rate and regular rhythm.   Pulmonary:      Effort: No respiratory distress.      Breath sounds: No wheezing.   Neurological:      Mental Status: She is alert.   Psychiatric:         Mood and Affect: Mood normal.         Behavior: Behavior normal.         Assessment & Plan   Diagnoses and all orders for this visit:    1. Hyperlipidemia LDL goal <100 (Primary)  -     Lipid Panel; Future  Continue Lipitor 10 mg po qhs               "

## 2024-12-25 DIAGNOSIS — E78.5 HYPERLIPIDEMIA LDL GOAL <100: ICD-10-CM

## 2024-12-26 RX ORDER — ATORVASTATIN CALCIUM 10 MG/1
10 TABLET, FILM COATED ORAL DAILY
Qty: 30 TABLET | Refills: 3 | Status: SHIPPED | OUTPATIENT
Start: 2024-12-26

## 2025-01-14 ENCOUNTER — LAB (OUTPATIENT)
Dept: LAB | Facility: HOSPITAL | Age: 70
End: 2025-01-14
Payer: MEDICARE

## 2025-01-14 ENCOUNTER — OFFICE VISIT (OUTPATIENT)
Dept: INTERNAL MEDICINE | Facility: CLINIC | Age: 70
End: 2025-01-14
Payer: MEDICARE

## 2025-01-14 VITALS
BODY MASS INDEX: 35.84 KG/M2 | WEIGHT: 223 LBS | HEIGHT: 66 IN | OXYGEN SATURATION: 98 % | SYSTOLIC BLOOD PRESSURE: 132 MMHG | TEMPERATURE: 97.6 F | DIASTOLIC BLOOD PRESSURE: 78 MMHG

## 2025-01-14 DIAGNOSIS — E78.5 HYPERLIPIDEMIA LDL GOAL <100: ICD-10-CM

## 2025-01-14 DIAGNOSIS — Z13.29 THYROID DISORDER SCREEN: ICD-10-CM

## 2025-01-14 DIAGNOSIS — Z78.0 POSTMENOPAUSE: ICD-10-CM

## 2025-01-14 DIAGNOSIS — Z00.00 MEDICARE ANNUAL WELLNESS VISIT, SUBSEQUENT: ICD-10-CM

## 2025-01-14 DIAGNOSIS — Z00.00 MEDICARE ANNUAL WELLNESS VISIT, SUBSEQUENT: Primary | ICD-10-CM

## 2025-01-14 LAB
ALBUMIN SERPL-MCNC: 4.2 G/DL (ref 3.5–5.2)
ALBUMIN/GLOB SERPL: 1.6 G/DL
ALP SERPL-CCNC: 72 U/L (ref 39–117)
ALT SERPL W P-5'-P-CCNC: 16 U/L (ref 1–33)
ANION GAP SERPL CALCULATED.3IONS-SCNC: 8.6 MMOL/L (ref 5–15)
AST SERPL-CCNC: 23 U/L (ref 1–32)
BASOPHILS # BLD AUTO: 0.09 10*3/MM3 (ref 0–0.2)
BASOPHILS NFR BLD AUTO: 1.2 % (ref 0–1.5)
BILIRUB SERPL-MCNC: 0.7 MG/DL (ref 0–1.2)
BUN SERPL-MCNC: 12 MG/DL (ref 8–23)
BUN/CREAT SERPL: 13.6 (ref 7–25)
CALCIUM SPEC-SCNC: 9.4 MG/DL (ref 8.6–10.5)
CHLORIDE SERPL-SCNC: 106 MMOL/L (ref 98–107)
CHOLEST SERPL-MCNC: 193 MG/DL (ref 0–200)
CO2 SERPL-SCNC: 28.4 MMOL/L (ref 22–29)
CREAT SERPL-MCNC: 0.88 MG/DL (ref 0.57–1)
DEPRECATED RDW RBC AUTO: 44.1 FL (ref 37–54)
EGFRCR SERPLBLD CKD-EPI 2021: 70.4 ML/MIN/1.73
EOSINOPHIL # BLD AUTO: 0.24 10*3/MM3 (ref 0–0.4)
EOSINOPHIL NFR BLD AUTO: 3.3 % (ref 0.3–6.2)
ERYTHROCYTE [DISTWIDTH] IN BLOOD BY AUTOMATED COUNT: 12.4 % (ref 12.3–15.4)
GLOBULIN UR ELPH-MCNC: 2.6 GM/DL
GLUCOSE SERPL-MCNC: 92 MG/DL (ref 65–99)
HCT VFR BLD AUTO: 43.9 % (ref 34–46.6)
HDLC SERPL-MCNC: 69 MG/DL (ref 40–60)
HGB BLD-MCNC: 15.1 G/DL (ref 12–15.9)
IMM GRANULOCYTES # BLD AUTO: 0.03 10*3/MM3 (ref 0–0.05)
IMM GRANULOCYTES NFR BLD AUTO: 0.4 % (ref 0–0.5)
LDLC SERPL CALC-MCNC: 100 MG/DL (ref 0–100)
LDLC/HDLC SERPL: 1.39 {RATIO}
LYMPHOCYTES # BLD AUTO: 1.93 10*3/MM3 (ref 0.7–3.1)
LYMPHOCYTES NFR BLD AUTO: 26.5 % (ref 19.6–45.3)
MCH RBC QN AUTO: 33.4 PG (ref 26.6–33)
MCHC RBC AUTO-ENTMCNC: 34.4 G/DL (ref 31.5–35.7)
MCV RBC AUTO: 97.1 FL (ref 79–97)
MONOCYTES # BLD AUTO: 0.51 10*3/MM3 (ref 0.1–0.9)
MONOCYTES NFR BLD AUTO: 7 % (ref 5–12)
NEUTROPHILS NFR BLD AUTO: 4.47 10*3/MM3 (ref 1.7–7)
NEUTROPHILS NFR BLD AUTO: 61.6 % (ref 42.7–76)
NRBC BLD AUTO-RTO: 0 /100 WBC (ref 0–0.2)
PLATELET # BLD AUTO: 258 10*3/MM3 (ref 140–450)
PMV BLD AUTO: 10.6 FL (ref 6–12)
POTASSIUM SERPL-SCNC: 4.5 MMOL/L (ref 3.5–5.2)
PROT SERPL-MCNC: 6.8 G/DL (ref 6–8.5)
RBC # BLD AUTO: 4.52 10*6/MM3 (ref 3.77–5.28)
SODIUM SERPL-SCNC: 143 MMOL/L (ref 136–145)
TRIGL SERPL-MCNC: 139 MG/DL (ref 0–150)
TSH SERPL DL<=0.05 MIU/L-ACNC: 3.76 UIU/ML (ref 0.27–4.2)
VLDLC SERPL-MCNC: 24 MG/DL (ref 5–40)
WBC NRBC COR # BLD AUTO: 7.27 10*3/MM3 (ref 3.4–10.8)

## 2025-01-14 PROCEDURE — 99213 OFFICE O/P EST LOW 20 MIN: CPT | Performed by: INTERNAL MEDICINE

## 2025-01-14 PROCEDURE — G0439 PPPS, SUBSEQ VISIT: HCPCS | Performed by: INTERNAL MEDICINE

## 2025-01-14 PROCEDURE — 1126F AMNT PAIN NOTED NONE PRSNT: CPT | Performed by: INTERNAL MEDICINE

## 2025-01-14 PROCEDURE — 80053 COMPREHEN METABOLIC PANEL: CPT

## 2025-01-14 PROCEDURE — 1160F RVW MEDS BY RX/DR IN RCRD: CPT | Performed by: INTERNAL MEDICINE

## 2025-01-14 PROCEDURE — 84443 ASSAY THYROID STIM HORMONE: CPT

## 2025-01-14 PROCEDURE — 85025 COMPLETE CBC W/AUTO DIFF WBC: CPT

## 2025-01-14 PROCEDURE — 1159F MED LIST DOCD IN RCRD: CPT | Performed by: INTERNAL MEDICINE

## 2025-01-14 PROCEDURE — 80061 LIPID PANEL: CPT

## 2025-01-14 PROCEDURE — 1170F FXNL STATUS ASSESSED: CPT | Performed by: INTERNAL MEDICINE

## 2025-01-14 NOTE — PROGRESS NOTES
Subjective   The ABCs of the Annual Wellness Visit  Medicare Wellness Visit      Samantha Gaines is a 69 y.o. patient who presents for a Medicare Wellness Visit.    The following portions of the patient's history were reviewed and   updated as appropriate: allergies, current medications, past family history, past medical history, past social history, past surgical history, and problem list.    Compared to one year ago, the patient's physical   health is the same.  Compared to one year ago, the patient's mental   health is the same.    Recent Hospitalizations:  She was not admitted to the hospital during the last year.     Current Medical Providers:  Patient Care Team:  Julia Figueroa DO as PCP - General  Julia Figueroa DO as PCP - Family Medicine    Outpatient Medications Prior to Visit   Medication Sig Dispense Refill    atorvastatin (LIPITOR) 10 MG tablet TAKE 1 TABLET BY MOUTH DAILY 30 tablet 3    Cholecalciferol (Vitamin D) 50 MCG (2000 UT) tablet Take 1 tablet by mouth Daily.      Probiotic Product (PROBIOTIC BLEND PO) Take  by mouth.       No facility-administered medications prior to visit.     No opioid medication identified on active medication list. I have reviewed chart for other potential  high risk medication/s and harmful drug interactions in the elderly.      Aspirin is not on active medication list.  Aspirin use is not indicated based on review of current medical condition/s. Risk of harm outweighs potential benefits.  .    Patient Active Problem List   Diagnosis    Hyperlipidemia LDL goal <100    Vitamin D deficiency    Diverticulosis    Tubular adenoma of colon     Advance Care Planning Advance Directive is on file.  ACP discussion was held with the patient during this visit. Patient has an advance directive in EMR which is still valid.             Objective   Vitals:    01/14/25 0910   BP: 132/78   BP Location: Left arm   Patient Position: Sitting   Cuff Size: Adult   Temp: 97.6 °F (36.4  "°C)   TempSrc: Temporal   SpO2: 98%   Weight: 101 kg (223 lb)   Height: 167 cm (65.75\")   PainSc: 0-No pain       Estimated body mass index is 36.27 kg/m² as calculated from the following:    Height as of this encounter: 167 cm (65.75\").    Weight as of this encounter: 101 kg (223 lb).                Does the patient have evidence of cognitive impairment? No                                                                                                Health  Risk Assessment    Smoking Status:  Social History     Tobacco Use   Smoking Status Never   Smokeless Tobacco Never     Alcohol Consumption:  Social History     Substance and Sexual Activity   Alcohol Use Not Currently    Comment: socially       Fall Risk Screen  STEADI Fall Risk Assessment was completed, and patient is at HIGH risk for falls. Assessment completed on:2025    Depression Screening   Little interest or pleasure in doing things? Not at all   Feeling down, depressed, or hopeless? Not at all   PHQ-2 Total Score 0      Health Habits and Functional and Cognitive Screenin/14/2025     9:07 AM   Functional & Cognitive Status   Do you have difficulty preparing food and eating? No   Do you have difficulty bathing yourself, getting dressed or grooming yourself? No   Do you have difficulty using the toilet? No   Do you have difficulty moving around from place to place? No   Do you have trouble with steps or getting out of a bed or a chair? No   Current Diet Well Balanced Diet   Dental Exam Up to date   Eye Exam Up to date   Exercise (times per week) 3 times per week   Current Exercises Include [place];Walking   Do you need help using the phone?  No   Are you deaf or do you have serious difficulty hearing?  No   Do you need help to go to places out of walking distance? No   Do you need help shopping? No   Do you need help preparing meals?  No   Do you need help with housework?  No   Do you need help with laundry? No   Do you need help taking your " medications? No   Do you need help managing money? No   Do you ever drive or ride in a car without wearing a seat belt? No   Have you felt unusual stress, anger or loneliness in the last month? No   Who do you live with? Spouse   If you need help, do you have trouble finding someone available to you? No   Have you been bothered in the last four weeks by sexual problems? No   Do you have difficulty concentrating, remembering or making decisions? No           Age-appropriate Screening Schedule:  Refer to the list below for future screening recommendations based on patient's age, sex and/or medical conditions. Orders for these recommended tests are listed in the plan section. The patient has been provided with a written plan.    Health Maintenance List  Health Maintenance   Topic Date Due    DXA SCAN  01/12/2025    ZOSTER VACCINE (2 of 2) 01/14/2025 (Originally 3/31/2014)    TDAP/TD VACCINES (1 - Tdap) 01/14/2026 (Originally 4/16/1974)    LIPID PANEL  05/24/2025    ANNUAL WELLNESS VISIT  01/14/2026    BMI FOLLOWUP  01/14/2026    MAMMOGRAM  07/18/2026    COLORECTAL CANCER SCREENING  02/13/2031    HEPATITIS C SCREENING  Completed    COVID-19 Vaccine  Completed    INFLUENZA VACCINE  Completed    Pneumococcal Vaccine 65+  Completed    PAP SMEAR  Discontinued                                                                                                                                                CMS Preventative Services Quick Reference  Risk Factors Identified During Encounter  Fall Risk-High or Moderate: Discussed Fall Prevention in the home    The above risks/problems have been discussed with the patient.  Pertinent information has been shared with the patient in the After Visit Summary.  An After Visit Summary and PPPS were made available to the patient.    Follow Up:   Next Medicare Wellness visit to be scheduled in 1 year.         Additional E&M Note during same encounter follows:  Patient has additional,  "significant, and separately identifiable condition(s)/problem(s) that require work above and beyond the Medicare Wellness Visit     Chief Complaint  Medicare Wellness-subsequent    Subjective   HPI  HLP. HLP controlled with Lipitor.     Review of Systems   Constitutional:  Negative for fatigue and fever.   Respiratory:  Negative for cough and shortness of breath.    Cardiovascular:  Negative for chest pain and leg swelling.   Gastrointestinal:  Negative for abdominal pain.   Neurological:  Negative for confusion.              Objective   Vital Signs:  /78 (BP Location: Left arm, Patient Position: Sitting, Cuff Size: Adult)   Temp 97.6 °F (36.4 °C) (Temporal)   Ht 167 cm (65.75\")   Wt 101 kg (223 lb)   SpO2 98%   BMI 36.27 kg/m²   Physical Exam  Vitals reviewed.   Cardiovascular:      Rate and Rhythm: Normal rate and regular rhythm.   Pulmonary:      Effort: No respiratory distress.      Breath sounds: No wheezing.   Neurological:      Mental Status: She is alert and oriented to person, place, and time.   Psychiatric:         Behavior: Behavior normal.                       Assessment and Plan            Medicare annual wellness visit, subsequent  Done today     CBC  Hyperlipidemia LDL goal <100   Lipids, cmp. Continue Lipitor 10 mg po qhs         Postmenopause    Orders:    DEXA Bone Density Axial; Future            Follow Up   No follow-ups on file.  Patient was given instructions and counseling regarding her condition or for health maintenance advice. Please see specific information pulled into the AVS if appropriate.    "

## 2025-07-10 ENCOUNTER — TELEPHONE (OUTPATIENT)
Dept: INTERNAL MEDICINE | Facility: CLINIC | Age: 70
End: 2025-07-10

## 2025-07-10 DIAGNOSIS — E78.5 HYPERLIPIDEMIA LDL GOAL <100: Primary | ICD-10-CM

## 2025-07-14 ENCOUNTER — LAB (OUTPATIENT)
Dept: LAB | Facility: HOSPITAL | Age: 70
End: 2025-07-14
Payer: MEDICARE

## 2025-07-14 DIAGNOSIS — E78.5 HYPERLIPIDEMIA LDL GOAL <100: ICD-10-CM

## 2025-07-14 LAB
CHOLEST SERPL-MCNC: 243 MG/DL (ref 0–200)
HDLC SERPL-MCNC: 55 MG/DL (ref 40–60)
LDLC SERPL CALC-MCNC: 154 MG/DL (ref 0–100)
LDLC/HDLC SERPL: 2.73 {RATIO}
TRIGL SERPL-MCNC: 188 MG/DL (ref 0–150)
VLDLC SERPL-MCNC: 34 MG/DL (ref 5–40)

## 2025-07-14 PROCEDURE — 80061 LIPID PANEL: CPT

## 2025-07-15 ENCOUNTER — RESULTS FOLLOW-UP (OUTPATIENT)
Dept: INTERNAL MEDICINE | Facility: CLINIC | Age: 70
End: 2025-07-15

## 2025-07-15 ENCOUNTER — OFFICE VISIT (OUTPATIENT)
Dept: INTERNAL MEDICINE | Facility: CLINIC | Age: 70
End: 2025-07-15
Payer: MEDICARE

## 2025-07-15 VITALS
DIASTOLIC BLOOD PRESSURE: 70 MMHG | WEIGHT: 204.3 LBS | HEIGHT: 66 IN | SYSTOLIC BLOOD PRESSURE: 130 MMHG | BODY MASS INDEX: 32.83 KG/M2 | OXYGEN SATURATION: 98 % | TEMPERATURE: 98.1 F | HEART RATE: 74 BPM

## 2025-07-15 DIAGNOSIS — Z79.899 HIGH RISK MEDICATION USE: ICD-10-CM

## 2025-07-15 DIAGNOSIS — E78.5 HYPERLIPIDEMIA LDL GOAL <100: ICD-10-CM

## 2025-07-15 PROCEDURE — 1126F AMNT PAIN NOTED NONE PRSNT: CPT | Performed by: INTERNAL MEDICINE

## 2025-07-15 PROCEDURE — 1159F MED LIST DOCD IN RCRD: CPT | Performed by: INTERNAL MEDICINE

## 2025-07-15 PROCEDURE — G2211 COMPLEX E/M VISIT ADD ON: HCPCS | Performed by: INTERNAL MEDICINE

## 2025-07-15 PROCEDURE — 99214 OFFICE O/P EST MOD 30 MIN: CPT | Performed by: INTERNAL MEDICINE

## 2025-07-15 PROCEDURE — 1160F RVW MEDS BY RX/DR IN RCRD: CPT | Performed by: INTERNAL MEDICINE

## 2025-07-15 RX ORDER — ATORVASTATIN CALCIUM 10 MG/1
10 TABLET, FILM COATED ORAL DAILY
Qty: 90 TABLET | Refills: 0 | Status: SHIPPED | OUTPATIENT
Start: 2025-07-15 | End: 2025-07-15 | Stop reason: SDUPTHER

## 2025-07-15 RX ORDER — ATORVASTATIN CALCIUM 10 MG/1
10 TABLET, FILM COATED ORAL DAILY
Qty: 90 TABLET | Refills: 2 | Status: SHIPPED | OUTPATIENT
Start: 2025-07-15

## 2025-07-15 RX ORDER — MULTIPLE VITAMINS W/ MINERALS TAB 9MG-400MCG
1 TAB ORAL DAILY
COMMUNITY

## 2025-07-15 NOTE — PROGRESS NOTES
"Subjective   Samantha Gaines is a 70 y.o. female.   Chief Complaint   Patient presents with    Hyperlipidemia       History of Present Illness   HLP f/u. HLP better control with Lipitor.  She had been out of cholesterol med for awhile so labs this time showed increased lipids and triglycerides.  The following portions of the patient's history were reviewed and updated as appropriate: allergies, current medications, past family history, past medical history, past social history, past surgical history, and problem list.    Review of Systems   Constitutional:  Negative for fatigue and fever.   Respiratory:  Negative for cough and shortness of breath.    Cardiovascular:  Negative for chest pain and leg swelling.   Gastrointestinal:  Negative for blood in stool.   Psychiatric/Behavioral:  Negative for confusion.      /70   Pulse 74   Temp 98.1 °F (36.7 °C) (Temporal)   Ht 167 cm (65.75\")   Wt 92.7 kg (204 lb 4.8 oz)   SpO2 98%   BMI 33.23 kg/m²     Objective   Physical Exam  Vitals reviewed.   Cardiovascular:      Rate and Rhythm: Normal rate and regular rhythm.   Pulmonary:      Effort: No respiratory distress.      Breath sounds: No wheezing.   Neurological:      General: No focal deficit present.      Mental Status: She is alert and oriented to person, place, and time.       Assessment & Plan   Diagnoses and all orders for this visit:    1. Hyperlipidemia LDL goal <100  -     Discontinue: atorvastatin (LIPITOR) 10 MG tablet; Take 1 tablet by mouth Daily. (Patient not taking: Reported on 7/15/2025)  Dispense: 90 tablet; Refill: 0  -     atorvastatin (LIPITOR) 10 MG tablet; Take 1 tablet by mouth Daily.  Dispense: 90 tablet; Refill: 2  Sent in lipitor.   2. High risk medication use  -     Comprehensive Metabolic Panel; Future                 "

## 2025-08-29 ENCOUNTER — TELEPHONE (OUTPATIENT)
Dept: INTERNAL MEDICINE | Facility: CLINIC | Age: 70
End: 2025-08-29
Payer: MEDICARE